# Patient Record
Sex: MALE | Race: WHITE | Employment: FULL TIME | URBAN - METROPOLITAN AREA
[De-identification: names, ages, dates, MRNs, and addresses within clinical notes are randomized per-mention and may not be internally consistent; named-entity substitution may affect disease eponyms.]

---

## 2017-01-06 ENCOUNTER — OFFICE VISIT (OUTPATIENT)
Dept: INTERNAL MEDICINE CLINIC | Age: 37
End: 2017-01-06

## 2017-01-06 VITALS
WEIGHT: 272 LBS | TEMPERATURE: 97.8 F | SYSTOLIC BLOOD PRESSURE: 140 MMHG | HEART RATE: 95 BPM | DIASTOLIC BLOOD PRESSURE: 80 MMHG | OXYGEN SATURATION: 97 % | BODY MASS INDEX: 41.22 KG/M2 | HEIGHT: 68 IN

## 2017-01-06 DIAGNOSIS — J22 ACUTE RESPIRATORY INFECTION: Primary | ICD-10-CM

## 2017-01-06 RX ORDER — DOXYCYCLINE 100 MG/1
100 CAPSULE ORAL 2 TIMES DAILY
Qty: 20 CAP | Refills: 0 | Status: SHIPPED | OUTPATIENT
Start: 2017-01-06 | End: 2017-01-16

## 2017-01-06 NOTE — MR AVS SNAPSHOT
Visit Information Date & Time Provider Department Dept. Phone Encounter #  
 1/6/2017  9:30 AM Jerman Avendano Internist of [address] [id number] Upcoming Health Maintenance Date Due DTaP/Tdap/Td series (1 - Tdap) 5/2/1999 COLONOSCOPY 8/12/2015 INFLUENZA AGE 9 TO ADULT 8/1/2016 Allergies as of 1/6/2017  Review Complete On: 1/6/2017 By: Linda Campos LPN No Active Allergies Current Immunizations  Reviewed on 5/21/2015 Name Date Influenza Vaccine 11/23/2013 Influenza Vaccine Split 10/5/2011 Influenza Vaccine Whole 9/12/2012, 11/4/2009 Not reviewed this visit Vitals BP Pulse Temp Height(growth percentile) Weight(growth percentile) SpO2  
 140/80 95 97.8 °F (36.6 °C) (Oral) 5' 8\" (1.727 m) 272 lb (123.4 kg) 97% BMI Smoking Status 41.36 kg/m2 Former Smoker Vitals History BMI and BSA Data Body Mass Index Body Surface Area  
 41.36 kg/m 2 2.43 m 2 Preferred Pharmacy Pharmacy Name Phone [facility], [address] [facility] [phone] Your Updated Medication List  
  
   
This list is accurate as of: 1/6/17  9:44 AM.  Always use your most recent med list.  
  
  
  
  
 azithromycin 250 mg tablet Commonly known as:  Napoleon Willard Take 2 tablets today, then take 1 tablet daily  
  
 dextroamphetamine-amphetamine 30 mg tablet Commonly known as:  ADDERALL Take 1 Tab by mouth two (2) times a day. escitalopram oxalate 20 mg tablet Commonly known as:  Eugena Leobardo TAKE 1 TABLET BY MOUTH EVERY DAY  
  
 hydrocortisone-pramoxine 2.5-1 % rectal cream  
Commonly known as:  [facility] [place] Apply to anal area TID  
  
 SUMAtriptan 50 mg tablet Commonly known as:  IMITREX Take 1 tab at onset of migraine. Can repeat 1 dose in 2 hrs. No more than 2 tabs in 24 hrs.  
  
 valACYclovir 500 mg tablet Commonly known as:  VALTREX Take 1 Tab by mouth two (2) times a day. Introducing [facility]! Wes Rivera introduces [employer] patient portal. Now you can access parts of your medical record, email your doctor's office, and request medication refills online. 1. In your internet browser, go to [email or web address] 2. Click on the First Time User? Click Here link in the Sign In box. You will see the New Member Sign Up page. 3. Enter your [employer] Access Code exactly as it appears below. You will not need to use this code after youve completed the sign-up process. If you do not sign up before the expiration date, you must request a new code. · [employer] Access Code: [email or web address] Expires: 4/6/2017  9:44 AM 
 
4. Enter the last four digits of your Social Security Number (xxxx) and Date of Birth (mm/dd/yyyy) as indicated and click Submit. You will be taken to the next sign-up page. 5. Create a [employer] ID. This will be your [employer] login ID and cannot be changed, so think of one that is secure and easy to remember. 6. Create a [employer] password. You can change your password at any time. 7. Enter your Password Reset Question and Answer. This can be used at a later time if you forget your password. 8. Enter your e-mail address. You will receive e-mail notification when new information is available in [address] 9. Click Sign Up. You can now view and download portions of your medical record. 10. Click the Download Summary menu link to download a portable copy of your medical information. If you have questions, please visit the Frequently Asked Questions section of the [employer] website. Remember, [employer] is NOT to be used for urgent needs. For medical emergencies, dial 911. Now available from your iPhone and Android! Please provide this summary of care documentation to your next provider. Your primary care clinician is listed as Nicolas Caldwell.  If you have any questions after today's visit, please call [phone].

## 2017-01-09 NOTE — PROGRESS NOTES
I reviewed the patient's medical history, the physician assistant's findings on physical examination, the patient's diagnoses, and treatment plan as documented in the progress note. I concur with the treatment plan as documented. There are no additional recommendations at this time.

## 2017-01-12 ENCOUNTER — TELEPHONE (OUTPATIENT)
Dept: INTERNAL MEDICINE CLINIC | Age: 37
End: 2017-01-12

## 2017-01-12 DIAGNOSIS — J40 BRONCHITIS: Primary | ICD-10-CM

## 2017-01-12 RX ORDER — CODEINE PHOSPHATE AND GUAIFENESIN 10; 100 MG/5ML; MG/5ML
5 SOLUTION ORAL
Qty: 120 ML | Refills: 0 | OUTPATIENT
Start: 2017-01-12 | End: 2017-03-23 | Stop reason: ALTCHOICE

## 2017-01-24 DIAGNOSIS — F98.8 ADD (ATTENTION DEFICIT DISORDER): ICD-10-CM

## 2017-01-24 NOTE — TELEPHONE ENCOUNTER
Reviewed report generated by the [facility]. Does not demonstrate aberrancies or inconsistencies with regard to the prescribing of controlled medications to this patient by other providers.   Last filed 12/27/16

## 2017-01-25 RX ORDER — DEXTROAMPHETAMINE SACCHARATE, AMPHETAMINE ASPARTATE, DEXTROAMPHETAMINE SULFATE AND AMPHETAMINE SULFATE 7.5; 7.5; 7.5; 7.5 MG/1; MG/1; MG/1; MG/1
30 TABLET ORAL 2 TIMES DAILY
Qty: 60 TAB | Refills: 0 | Status: SHIPPED | OUTPATIENT
Start: 2017-01-25 | End: 2017-02-22 | Stop reason: SDUPTHER

## 2017-02-22 DIAGNOSIS — F98.8 ADD (ATTENTION DEFICIT DISORDER): ICD-10-CM

## 2017-02-22 RX ORDER — DEXTROAMPHETAMINE SACCHARATE, AMPHETAMINE ASPARTATE, DEXTROAMPHETAMINE SULFATE AND AMPHETAMINE SULFATE 7.5; 7.5; 7.5; 7.5 MG/1; MG/1; MG/1; MG/1
30 TABLET ORAL 2 TIMES DAILY
Qty: 60 TAB | Refills: 0 | Status: SHIPPED | OUTPATIENT
Start: 2017-02-22 | End: 2017-03-21 | Stop reason: SDUPTHER

## 2017-02-22 NOTE — TELEPHONE ENCOUNTER
Patient called for   Requested Prescriptions     Pending Prescriptions Disp Refills    dextroamphetamine-amphetamine (ADDERALL) 30 mg tablet 60 Tab 0     Sig: Take 1 Tab by mouth two (2) times a day. Please call when ready for .

## 2017-02-22 NOTE — TELEPHONE ENCOUNTER
Last filled per  1/26/17  Reviewed report generated by the [facility]. Does not demonstrate aberrancies or inconsistencies with regard to the prescribing of controlled medications to this patient by other providers.

## 2017-03-21 DIAGNOSIS — F98.8 ADD (ATTENTION DEFICIT DISORDER): ICD-10-CM

## 2017-03-22 RX ORDER — DEXTROAMPHETAMINE SACCHARATE, AMPHETAMINE ASPARTATE, DEXTROAMPHETAMINE SULFATE AND AMPHETAMINE SULFATE 7.5; 7.5; 7.5; 7.5 MG/1; MG/1; MG/1; MG/1
30 TABLET ORAL 2 TIMES DAILY
Qty: 60 TAB | Refills: 0 | Status: SHIPPED | OUTPATIENT
Start: 2017-03-22 | End: 2017-04-21 | Stop reason: SDUPTHER

## 2017-03-23 ENCOUNTER — OFFICE VISIT (OUTPATIENT)
Dept: INTERNAL MEDICINE CLINIC | Age: 37
End: 2017-03-23

## 2017-03-23 VITALS
SYSTOLIC BLOOD PRESSURE: 143 MMHG | WEIGHT: 279.8 LBS | HEIGHT: 68 IN | RESPIRATION RATE: 16 BRPM | TEMPERATURE: 97.5 F | HEART RATE: 119 BPM | BODY MASS INDEX: 42.41 KG/M2 | OXYGEN SATURATION: 97 % | DIASTOLIC BLOOD PRESSURE: 103 MMHG

## 2017-03-23 DIAGNOSIS — E66.01 OBESITY, MORBID, BMI 40.0-49.9 (HCC): ICD-10-CM

## 2017-03-23 DIAGNOSIS — I10 ESSENTIAL HYPERTENSION: Primary | ICD-10-CM

## 2017-03-23 DIAGNOSIS — R00.0 TACHYCARDIA: ICD-10-CM

## 2017-03-23 DIAGNOSIS — F98.8 ADD (ATTENTION DEFICIT DISORDER): ICD-10-CM

## 2017-03-23 DIAGNOSIS — I10 ESSENTIAL HYPERTENSION: ICD-10-CM

## 2017-03-23 LAB
A-G RATIO,AGRAT: 1.8 RATIO (ref 1.1–2.6)
ABSOLUTE LYMPHOCYTE COUNT, 10803: 1.8 K/UL (ref 1–4.8)
ALBUMIN SERPL-MCNC: 4.4 G/DL (ref 3.5–5)
ALP SERPL-CCNC: 105 U/L (ref 25–115)
ALT SERPL-CCNC: 8 U/L (ref 5–40)
ANION GAP SERPL CALC-SCNC: 20 MMOL/L
AST SERPL W P-5'-P-CCNC: 18 U/L (ref 10–37)
BASOPHILS # BLD: 0 K/UL (ref 0–0.2)
BASOPHILS NFR BLD: 0 % (ref 0–2)
BILIRUB SERPL-MCNC: 0.6 MG/DL (ref 0.2–1.2)
BUN SERPL-MCNC: 9 MG/DL (ref 6–22)
CALCIUM SERPL-MCNC: 9.1 MG/DL (ref 8.4–10.4)
CHLORIDE SERPL-SCNC: 97 MMOL/L (ref 98–110)
CO2 SERPL-SCNC: 21 MMOL/L (ref 20–32)
CREAT SERPL-MCNC: 0.7 MG/DL (ref 0.5–1.2)
EOSINOPHIL # BLD: 0.2 K/UL (ref 0–0.5)
EOSINOPHIL NFR BLD: 2 % (ref 0–6)
ERYTHROCYTE [DISTWIDTH] IN BLOOD BY AUTOMATED COUNT: 13.1 % (ref 10–16)
GFRAA, 66117: >60
GFRNA, 66118: >60
GLOBULIN,GLOB: 2.5 G/DL (ref 2–4)
GLUCOSE SERPL-MCNC: 64 MG/DL (ref 65–99)
GRANULOCYTES,GRANS: 68 % (ref 40–75)
HCT VFR BLD AUTO: 44.5 % (ref 36.6–51.9)
HGB BLD-MCNC: 15.3 G/DL (ref 13.2–17.3)
LYMPHOCYTES, LYMLT: 20 % (ref 27–45)
MCH RBC QN AUTO: 31 PG (ref 26–34)
MCHC RBC AUTO-ENTMCNC: 34 G/DL (ref 32–36)
MCV RBC AUTO: 90 FL (ref 80–95)
MONOCYTES # BLD: 0.8 K/UL (ref 0.1–0.9)
MONOCYTES NFR BLD: 9 % (ref 3–9)
NEUTROPHILS # BLD AUTO: 5.9 K/UL (ref 1.8–7.7)
PLATELET # BLD AUTO: 314 K/UL (ref 140–440)
PMV BLD AUTO: 9.9 FL (ref 6–10.8)
POTASSIUM SERPL-SCNC: 5 MMOL/L (ref 3.5–5.5)
PROT SERPL-MCNC: 6.9 G/DL (ref 6.4–8.3)
RBC # BLD AUTO: 4.94 M/UL (ref 3.8–5.8)
SODIUM SERPL-SCNC: 138 MMOL/L (ref 133–145)
WBC # BLD AUTO: 8.8 K/UL (ref 4–11)

## 2017-03-23 NOTE — PATIENT INSTRUCTIONS
Health Maintenance Due   Topic Date Due    DTaP/Tdap/Td series (1 - Tdap) 05/02/1999    COLONOSCOPY  08/12/2015    INFLUENZA AGE 9 TO ADULT  08/01/2016          Eating Healthy Foods: Care Instructions  Your Care Instructions  Eating healthy foods can help lower your risk for disease. Healthy food gives you energy and keeps your heart strong, your brain active, your muscles working, and your bones strong. A healthy diet includes a variety of foods from the basic food groups: grains, vegetables, fruits, milk and milk products, and meat and beans. Some people may eat more of their favorite foods from only one food group and, as a result, miss getting the nutrients they need. So, it is important to pay attention not only to what you eat but also to what you are missing from your diet. You can eat a healthy, balanced diet by making a few small changes. Follow-up care is a key part of your treatment and safety. Be sure to make and go to all appointments, and call your doctor if you are having problems. Its also a good idea to know your test results and keep a list of the medicines you take. How can you care for yourself at home? Look at what you eat  · Keep a food diary for a week or two and record everything you eat or drink. Track the number of servings you eat from each food group. · For a balanced diet every day, eat a variety of:  ¨ 6 or more ounce-equivalents of grains, such as cereals, breads, crackers, rice, or pasta, every day. An ounce-equivalent is 1 slice of bread, 1 cup of ready-to-eat cereal, or ½ cup of cooked rice, cooked pasta, or cooked cereal.  ¨ 2½ cups of vegetables, especially:  § Dark-green vegetables such as broccoli and spinach. § Orange vegetables such as carrots and sweet potatoes. § Dry beans (such as knowles and kidney beans) and peas (such as lentils). ¨ 2 cups of fresh, frozen, or canned fruit. A small apple or 1 banana or orange equals 1 cup.   ¨ 3 cups of nonfat or low-fat milk, yogurt, or other milk products. ¨ 5½ ounces of meat and beans, such as chicken, fish, lean meat, beans, nuts, and seeds. One egg, 1 tablespoon of peanut butter, ½ ounce nuts or seeds, or ¼ cup of cooked beans equals 1 ounce of meat. · Learn how to read food labels for serving sizes and ingredients. Fast-food and convenience-food meals often contain few or no fruits or vegetables. Make sure you eat some fruits and vegetables to make the meal more nutritious. · Look at your food diary. For each food group, add up what you have eaten and then divide the total by the number of days. This will give you an idea of how much you are eating from each food group. See if you can find some ways to change your diet to make it more healthy. Start small  · Do not try to make dramatic changes to your diet all at once. You might feel that you are missing out on your favorite foods and then be more likely to fail. · Start slowly, and gradually change your habits. Try some of the following:  ¨ Use whole wheat bread instead of white bread. ¨ Use nonfat or low-fat milk instead of whole milk. ¨ Eat brown rice instead of white rice, and eat whole wheat pasta instead of white-flour pasta. ¨ Try low-fat cheeses and low-fat yogurt. ¨ Add more fruits and vegetables to meals and have them for snacks. ¨ Add lettuce, tomato, cucumber, and onion to sandwiches. ¨ Add fruit to yogurt and cereal.  Enjoy food  · You can still eat your favorite foods. You just may need to eat less of them. If your favorite foods are high in fat, salt, and sugar, limit how often you eat them, but do not cut them out entirely. · Eat a wide variety of foods. Make healthy choices when eating out  · The type of restaurant you choose can help you make healthy choices. Even fast-food chains are now offering more low-fat or healthier choices on the menu. · Choose smaller portions, or take half of your meal home.   · When eating out, try:  ¨ A veggie pizza with a whole wheat crust or grilled chicken (instead of sausage or pepperoni). ¨ Pasta with roasted vegetables, grilled chicken, or marinara sauce instead of cream sauce. ¨ A vegetable wrap or grilled chicken wrap. ¨ Broiled or poached food instead of fried or breaded items. Make healthy choices easy  · Buy packaged, prewashed, ready-to-eat fresh vegetables and fruits, such as baby carrots, salad mixes, and chopped or shredded broccoli and cauliflower. · Buy packaged, presliced fruits, such as melon or pineapple. · Choose 100% fruit or vegetable juice instead of soda. Limit juice intake to 4 to 6 oz (½ to ¾ cup) a day. · Blend low-fat yogurt, fruit juice, and canned or frozen fruit to make a smoothie for breakfast or a snack. Where can you learn more? Go to http://shaylaAmerican Biosurgicalmellissa.info/. Enter T756 in the search box to learn more about \"Eating Healthy Foods: Care Instructions. \"  Current as of: November 20, 2015  Content Version: 11.1  © 2037-1964 [employer]. Care instructions adapted under license by [employer] (which disclaims liability or warranty for this information). If you have questions about a medical condition or this instruction, always ask your healthcare professional. [address] any warranty or liability for your use of this information. Food as Fuel: Care Instructions  Your Care Instructions  A healthy, balanced diet gives your body nutrients. Nutrients are like fuel for your body. They give you energy. And they keep your heart beating, your brain active, and your muscles working. They also help to build and strengthen bones, muscles, and other body tissues. Your body needs three major nutrients for energy. These are carbohydrate, protein, and fat. · Carbohydrate provides energy for your brain, muscles, heart, and lungs. It is found in bread, cereal, rice, pasta, fruits, vegetables, milk, yogurt, and sugar.   · Protein provides energy and helps build and repair your body's cells. It is found in meat, poultry, fish, cooked dry beans, cheese, tofu and other soy products, nuts and seeds, and milk and milk products. · Fat provides energy, helps build the covering around nerves in your body, and helps make hormones. Fat is found in butter, margarine, oil, mayonnaise, salad dressing, and nuts. It is also found in most foods that come from animals, such as meat and milk products. Many foods also have fat added to them. Your body needs all three of these nutrients to be healthy. If you choose a good mix of foods, you can help your body get the right amounts of carbohydrate, protein, and fat. It can also keep you at a healthy weight. Follow-up care is a key part of your treatment and safety. Be sure to make and go to all appointments, and call your doctor if you are having problems. It's also a good idea to know your test results and keep a list of the medicines you take. How can you care for yourself at home? Eat a balanced diet  · Try to eat variety of healthy foods every day. These include:  ¨ 5 to 8 ounces of bread, cereal, crackers, rice, or pasta. An ounce is about 1 slice of bread, ¾ cup of breakfast cereal, or ½ cup of cooked rice, cereal, or pasta. Choose whole-grain products for at least half of your grain servings. ¨ 2 to 3 cups of vegetables. Be sure to include:  § Dark green vegetables such as broccoli and spinach. § Orange vegetables such as carrots and sweet potatoes. ¨ 1½ to 2 cups of fresh, frozen, or canned fruit. A banana, an orange, or a large apple equals 1 cup. ¨ 3 cups of nonfat or low-fat milk, yogurt, or other milk products. ¨ 5 to 6½ ounces of protein foods. These include chicken, fish, lean meat, beans, nuts, and seeds. One egg equals 1 ounce of meat, poultry, or fish. A ½ cup of cooked beans equals 2 ounces of protein.   Stay fueled all day  · Start your day with breakfast. If you don't have time to sit down for a bowl of cereal in the morning, try something that you can eat \"on the go. \" Try a piece of whole wheat bread with peanut butter or a container of yogurt with frozen berries mixed in. · Eat regularly scheduled meals and snacks. If you miss a meal, you may overeat at the next meal. Or you may choose a less healthy snack. · Drink enough water. (If you have kidney, heart, or liver disease and have to limit fluids, talk with your doctor before you increase your fluid intake.)  Where can you learn more? Go to http://shayla-mellissa.info/. Enter L985 in the search box to learn more about \"Food as Fuel: Care Instructions. \"  Current as of: July 26, 2016  Content Version: 11.1  © 7376-4610 [employer]. Care instructions adapted under license by [employer] (which disclaims liability or warranty for this information). If you have questions about a medical condition or this instruction, always ask your healthcare professional. [place] [address] any warranty or liability for your use of this information. High Blood Pressure: Care Instructions  Your Care Instructions  If your blood pressure is usually above 140/90, you have high blood pressure, or hypertension. That means the top number is 140 or higher or the bottom number is 90 or higher, or both. Despite what a lot of people think, high blood pressure usually doesn't cause headaches or make you feel dizzy or lightheaded. It usually has no symptoms. But it does increase your risk for heart attack, stroke, and kidney or eye damage. The higher your blood pressure, the more your risk increases. Your doctor will give you a goal for your blood pressure. Your goal will be based on your health and your age. An example of a goal is to keep your blood pressure below 140/90. Lifestyle changes, such as eating healthy and being active, are always important to help lower blood pressure.  You might also take medicine to reach your blood pressure goal.  Follow-up care is a key part of your treatment and safety. Be sure to make and go to all appointments, and call your doctor if you are having problems. It's also a good idea to know your test results and keep a list of the medicines you take. How can you care for yourself at home? Medical treatment  · If you stop taking your medicine, your blood pressure will go back up. You may take one or more types of medicine to lower your blood pressure. Be safe with medicines. Take your medicine exactly as prescribed. Call your doctor if you think you are having a problem with your medicine. · Talk to your doctor before you start taking aspirin every day. Aspirin can help certain people lower their risk of a heart attack or stroke. But taking aspirin isn't right for everyone, because it can cause serious bleeding. · See your doctor regularly. You may need to see the doctor more often at first or until your blood pressure comes down. · If you are taking blood pressure medicine, talk to your doctor before you take decongestants or anti-inflammatory medicine, such as ibuprofen. Some of these medicines can raise blood pressure. · Learn how to check your blood pressure at home. Lifestyle changes  · Stay at a healthy weight. This is especially important if you put on weight around the waist. Losing even 10 pounds can help you lower your blood pressure. · If your doctor recommends it, get more exercise. Walking is a good choice. Bit by bit, increase the amount you walk every day. Try for at least 30 minutes on most days of the week. You also may want to swim, bike, or do other activities. · Avoid or limit alcohol. Talk to your doctor about whether you can drink any alcohol. · Try to limit how much sodium you eat to less than 2,300 milligrams (mg) a day. Your doctor may ask you to try to eat less than 1,500 mg a day.   · Eat plenty of fruits (such as bananas and oranges), vegetables, legumes, whole grains, and low-fat dairy products. · Lower the amount of saturated fat in your diet. Saturated fat is found in animal products such as milk, cheese, and meat. Limiting these foods may help you lose weight and also lower your risk for heart disease. · Do not smoke. Smoking increases your risk for heart attack and stroke. If you need help quitting, talk to your doctor about stop-smoking programs and medicines. These can increase your chances of quitting for good. When should you call for help? Call 911 anytime you think you may need emergency care. This may mean having symptoms that suggest that your blood pressure is causing a serious heart or blood vessel problem. Your blood pressure may be over 180/110. For example, call 911 if:  · You have symptoms of a heart attack. These may include:  ¨ Chest pain or pressure, or a strange feeling in the chest.  ¨ Sweating. ¨ Shortness of breath. ¨ Nausea or vomiting. ¨ Pain, pressure, or a strange feeling in the back, neck, jaw, or upper belly or in one or both shoulders or arms. ¨ Lightheadedness or sudden weakness. ¨ A fast or irregular heartbeat. · You have symptoms of a stroke. These may include:  ¨ Sudden numbness, tingling, weakness, or loss of movement in your face, arm, or leg, especially on only one side of your body. ¨ Sudden vision changes. ¨ Sudden trouble speaking. ¨ Sudden confusion or trouble understanding simple statements. ¨ Sudden problems with walking or balance. ¨ A sudden, severe headache that is different from past headaches. · You have severe back or belly pain. Do not wait until your blood pressure comes down on its own. Get help right away. Call your doctor now or seek immediate care if:  · Your blood pressure is much higher than normal (such as 180/110 or higher), but you don't have symptoms. · You think high blood pressure is causing symptoms, such as:  ¨ Severe headache. ¨ Blurry vision.   Watch closely for changes in your health, and be sure to contact your doctor if:  · Your blood pressure measures 140/90 or higher at least 2 times. That means the top number is 140 or higher or the bottom number is 90 or higher, or both. · You think you may be having side effects from your blood pressure medicine. · Your blood pressure is usually normal, but it goes above normal at least 2 times. Where can you learn more? Go to http://shayla-mellissa.info/. Enter K004 in the search box to learn more about \"High Blood Pressure: Care Instructions. \"  Current as of: August 8, 2016  Content Version: 11.1  © 2067-7314 [employer]. Care instructions adapted under license by [employer] (which disclaims liability or warranty for this information). If you have questions about a medical condition or this instruction, always ask your healthcare professional. [address] any warranty or liability for your use of this information. DASH Diet: Care Instructions  Your Care Instructions  The DASH diet is an eating plan that can help lower your blood pressure. DASH stands for Dietary Approaches to Stop Hypertension. Hypertension is high blood pressure. The DASH diet focuses on eating foods that are high in calcium, potassium, and magnesium. These nutrients can lower blood pressure. The foods that are highest in these nutrients are fruits, vegetables, low-fat dairy products, nuts, seeds, and legumes. But taking calcium, potassium, and magnesium supplements instead of eating foods that are high in those nutrients does not have the same effect. The DASH diet also includes whole grains, fish, and poultry. The DASH diet is one of several lifestyle changes your doctor may recommend to lower your high blood pressure. Your doctor may also want you to decrease the amount of sodium in your diet.  Lowering sodium while following the DASH diet can lower blood pressure even further than just the DASH diet alone.  Follow-up care is a key part of your treatment and safety. Be sure to make and go to all appointments, and call your doctor if you are having problems. It's also a good idea to know your test results and keep a list of the medicines you take. How can you care for yourself at home? Following the DASH diet  · Eat 4 to 5 servings of fruit each day. A serving is 1 medium-sized piece of fruit, ½ cup chopped or canned fruit, 1/4 cup dried fruit, or 4 ounces (½ cup) of fruit juice. Choose fruit more often than fruit juice. · Eat 4 to 5 servings of vegetables each day. A serving is 1 cup of lettuce or raw leafy vegetables, ½ cup of chopped or cooked vegetables, or 4 ounces (½ cup) of vegetable juice. Choose vegetables more often than vegetable juice. · Get 2 to 3 servings of low-fat and fat-free dairy each day. A serving is 8 ounces of milk, 1 cup of yogurt, or 1 ½ ounces of cheese. · Eat 6 to 8 servings of grains each day. A serving is 1 slice of bread, 1 ounce of dry cereal, or ½ cup of cooked rice, pasta, or cooked cereal. Try to choose whole-grain products as much as possible. · Limit lean meat, poultry, and fish to 2 servings each day. A serving is 3 ounces, about the size of a deck of cards. · Eat 4 to 5 servings of nuts, seeds, and legumes (cooked dried beans, lentils, and split peas) each week. A serving is 1/3 cup of nuts, 2 tablespoons of seeds, or ½ cup of cooked beans or peas. · Limit fats and oils to 2 to 3 servings each day. A serving is 1 teaspoon of vegetable oil or 2 tablespoons of salad dressing. · Limit sweets and added sugars to 5 servings or less a week. A serving is 1 tablespoon jelly or jam, ½ cup sorbet, or 1 cup of lemonade. · Eat less than 2,300 milligrams (mg) of sodium a day. If you limit your sodium to 1,500 mg a day, you can lower your blood pressure even more. Tips for success  · Start small. Do not try to make dramatic changes to your diet all at once.  You might feel that you are missing out on your favorite foods and then be more likely to not follow the plan. Make small changes, and stick with them. Once those changes become habit, add a few more changes. · Try some of the following:  ¨ Make it a goal to eat a fruit or vegetable at every meal and at snacks. This will make it easy to get the recommended amount of fruits and vegetables each day. ¨ Try yogurt topped with fruit and nuts for a snack or healthy dessert. ¨ Add lettuce, tomato, cucumber, and onion to sandwiches. ¨ Combine a ready-made pizza crust with low-fat mozzarella cheese and lots of vegetable toppings. Try using tomatoes, squash, spinach, broccoli, carrots, cauliflower, and onions. ¨ Have a variety of cut-up vegetables with a low-fat dip as an appetizer instead of chips and dip. ¨ Sprinkle sunflower seeds or chopped almonds over salads. Or try adding chopped walnuts or almonds to cooked vegetables. ¨ Try some vegetarian meals using beans and peas. Add garbanzo or kidney beans to salads. Make burritos and tacos with mashed knowles beans or black beans. Where can you learn more? Go to http://shayla-mellissa.info/. Enter K531 in the search box to learn more about \"DASH Diet: Care Instructions. \"  Current as of: March 23, 2016  Content Version: 11.1  © 8902-8372 [employer]. Care instructions adapted under license by [employer] (which disclaims liability or warranty for this information). If you have questions about a medical condition or this instruction, always ask your healthcare professional. [place] [address] any warranty or liability for your use of this information.

## 2017-03-23 NOTE — PROGRESS NOTES
HISTORY OF PRESENT Jean Bhardwaj is a 43 y.o. male. Here today for acute visit. HPI   Elevated BP/ tachycardia  Patient states he was at his podiatrist office on Monday, his vitals showed elevated /92, . He had no chest pain, sob or any symtpoms, was referred to an Urgent care for eval. They advized him to follow up with his PCP. He has a BP log with him today for the last 3 days:   122/92   156/92   136/90 HR 95  147/106   123/97   131/98   145/101   S/p gastric bypass sugery 2010 at weight of 370s. Weight down to 190 lbs, now has gained up to 279 again. Per his medical chart he was previously diagnosed with HTN on lisiniopril 10mg, but he can not recall this. He denies any symptoms of CP, SOB, BARRY, breathing problems. No pain, no signs of infection. Denies any bloody or black stools. Denies anxiety or stress. He does not feel his heart racing or any palpitations. He is on adderall 30mg daily, he states he has been on this for about 10 years, has never had any problems with this before. His mother had multiple heart problems, valve disorders. He has been working to cut out sodas this week.      Past Medical History:   Diagnosis Date    ADD  10/11    Allergic rhinitis     Asthma     Chronic pain     Back    Colon polyp     Depression     FHx: heart disease     GERD (gastroesophageal reflux disease)     Hepatic steatosis     HTN (hypertension)     resolved after wt loss    Obesity     Gastric bypass 5/10 Dr. Veronica Parada BMI 54, peak weight 367; elvis 195 lbs    FRANCISCA on CPAP     resolved after wt loss    Stomach ulcer 2002    Dr. Alan Witt Ulcerative proctitis ([facility] [address]     WRA-Back, Hips, Feet      Past Surgical History:   Procedure Laterality Date    BIOPSY LIVER      Dr Veronica Parada 2010    ENDOSCOPY, COLON, DIAGNOSTIC      2002 negative, last 2005 proctitis Dr Beba Rendon  9/08    Dr Ritika Cervantes  HX GASTRIC BYPASS  3/10    Lap Cherri-en-Y BMI 47    HX ORTHOPAEDIC      left hip Dr Kathryn Milian age 13     Current Outpatient Prescriptions   Medication Sig    dextroamphetamine-amphetamine (ADDERALL) 30 mg tablet Take 1 Tab by mouth two (2) times a dayEarliest Fill Date: 3/22/17.  escitalopram oxalate (LEXAPRO) 20 mg tablet TAKE 1 TABLET BY MOUTH EVERY DAY    valACYclovir (VALTREX) 500 mg tablet Take 1 Tab by mouth two (2) times a day.  SUMAtriptan (IMITREX) 50 mg tablet Take 1 tab at onset of migraine. Can repeat 1 dose in 2 hrs. No more than 2 tabs in 24 hrs. No current facility-administered medications for this visit. Allergies and Intolerances:   No Active Allergies  Family History:   Family History   Problem Relation Age of Onset    Cancer Maternal Grandmother      colon    Stroke Maternal Grandfather     Diabetes Paternal Grandfather     Heart Disease Paternal Grandfather     Cancer Paternal Grandfather      colon    Arthritis-osteo Mother     Heart Disease Mother     Depression Mother     Hypertension Father     Depression Father     Elevated Lipids Father         Social History:   He  reports that he has quit smoking. His smokeless tobacco use includes Chew. He  reports that he does not drink alcohol. Vitals:   Visit Vitals    BP (!) 143/103    Pulse (!) 119    Temp 97.5 °F (36.4 °C) (Oral)    Resp 16    Ht 5' 8\" (1.727 m)    Wt 279 lb 12.8 oz (126.9 kg)    SpO2 97%    BMI 42.54 kg/m2        Body surface area is 2.47 meters squared. BP Readings from Last 3 Encounters:   03/23/17 (!) 143/103   01/06/17 140/80   01/06/16 134/82        Wt Readings from Last 3 Encounters:   03/23/17 279 lb 12.8 oz (126.9 kg)   01/06/17 272 lb (123.4 kg)   01/06/16 247 lb (112 kg)        Case and notes discussed with MA and reviewed with patient for accuracy.      I have personally reviewed and subsequently discussed with the MA any pertinent, preliminary and incomplete elements of the history related to the chief complaint that were recorded by the MA in the patients chart during the initial rooming of the patient. As I have explored the necessary and required elements in detail with the patient during my personal interview with them, I have personally verified, clarified, amended, added to and/or revised said elements based on information acquired during during the interview. Review of Systems   Constitutional: Positive for malaise/fatigue. Negative for chills, fever and weight loss. HENT: Negative for congestion, ear pain and sore throat. Eyes: Negative for blurred vision and double vision. Respiratory: Negative for cough, hemoptysis, sputum production, shortness of breath and wheezing. Cardiovascular: Negative for chest pain, palpitations and leg swelling. Gastrointestinal: Negative for abdominal pain, blood in stool, constipation, diarrhea, melena, nausea and vomiting. Genitourinary: Negative for dysuria, frequency, hematuria and urgency. Musculoskeletal: Positive for neck pain. Skin: Negative for itching and rash. Neurological: Positive for headaches. Negative for seizures and loss of consciousness. Physical Exam   Constitutional: He appears well-developed and well-nourished. No distress. HENT:   Head: Normocephalic and atraumatic. Nose: Nose normal.   Mouth/Throat: Uvula is midline, oropharynx is clear and moist and mucous membranes are normal.   Eyes: Conjunctivae are normal. Pupils are equal, round, and reactive to light. Cardiovascular: Regular rhythm and normal heart sounds. Tachycardia present. No murmur heard. Pulmonary/Chest: Effort normal and breath sounds normal. No respiratory distress. Abdominal: Soft. Bowel sounds are normal.   Musculoskeletal: He exhibits no edema. Neurological: He is alert. Skin: Skin is warm and dry. No rash noted. Psychiatric: He has a normal mood and affect.  His behavior is normal.   Nursing note and vitals reviewed. EKG today: Sinus tachycardia , no acute abnormalities, reviewed with Dr Kandy Waterman in the office today. ASSESSMENT and PLAN    ICD-10-CM ICD-9-CM    1. Essential hypertension: history of HTN, then off meds with weight loss after bariatric surg. Discussed what HTN is, what it means for your health, importance of controlling BP. Discussed lifestyle and dietary changes, low sodium, heart healthy. Discussed weight loss as an important part of BP control. continue to monitor BP and HR at home, bring to follow up apt. Will follow up with patient in 2-4 weeks, may need to add medication. I10 401.9 AMB POC EKG ROUTINE W/ 12 LEADS, INTER & REP      CBC WITH AUTOMATED DIFF      METABOLIC PANEL, COMPREHENSIVE      TSH 3RD GENERATION      2D ECHO COMPLETE ADULT (TTE) W OR WO CONTR      LIPID PANEL   2. Tachycardia: unclear on cause. EKG with no acute abnormalities other than slightly tachy at 103. No chest pain, no SOB, BARRY, no other symptoms. Denies anxiety. Discussed may be due to deconditioning, may be related to adderall use. Will start work up with labs, cbc, cmp, tsh. Will check echo, family history of valve disorders in his mother. Encouraged adequate hydration, at least 64 oz water a day. Cut back on sodas. Discussed alarm symptoms, reasons to call or follow up sooner, and reasons to report to the ER. Patient verbalizes understanding and agrees to plan.    R00.0 785.0 AMB POC EKG ROUTINE W/ 12 LEADS, INTER & REP      CBC WITH AUTOMATED DIFF      METABOLIC PANEL, COMPREHENSIVE      TSH 3RD GENERATION      2D ECHO COMPLETE ADULT (TTE) W OR WO CONTR      LIPID PANEL   3. Obesity, morbid, BMI 40.0-49.9 ([address]): HTN and elevated HR my be due to deconditioning, he does admit he has not been dieting or watching what he eats, no exercise. Discussed need to get back on track with weight loss. E66.01 278.01 LIPID PANEL   4. ADD (attention deficit disorder): continues on adderall 30mg daily. Discussed that this could cause HTN, tachycardia. He has been on this for several years now with no previous problems. Could trial off this medication if no improvement to BP and HR. Will follow up in 2-4 weeks to reassess. F98.8 314.00      Lee Veliz was seen today for hypertension and fatigue. Diagnoses and all orders for this visit:    Essential hypertension  -     AMB POC EKG ROUTINE W/ 12 LEADS, INTER & REP  -     CBC WITH AUTOMATED DIFF; Future  -     METABOLIC PANEL, COMPREHENSIVE; Future  -     TSH 3RD GENERATION; Future  -     2D ECHO COMPLETE ADULT (TTE) W OR WO CONTR; Future  -     LIPID PANEL; Future    Tachycardia  -     AMB POC EKG ROUTINE W/ 12 LEADS, INTER & REP  -     CBC WITH AUTOMATED DIFF; Future  -     METABOLIC PANEL, COMPREHENSIVE; Future  -     TSH 3RD GENERATION; Future  -     2D ECHO COMPLETE ADULT (TTE) W OR WO CONTR; Future  -     LIPID PANEL; Future    Obesity, morbid, BMI 40.0-49.9 ([facility] [address]  -     LIPID PANEL; Future    ADD (attention deficit disorder)      Follow-up Disposition:  Return in about 2 weeks (around 4/6/2017), or if symptoms worsen or fail to improve. The plan of care was discussed with the patient, who verbalizes understanding. Discussed all medications, side effects with patient. The patient is to follow up as scheduled and will report to the ED or the office if symptoms change or increase. The patient has voiced understanding and will comply to plan of care.

## 2017-03-23 NOTE — PROGRESS NOTES
Chief Complaint   Patient presents with    Hypertension     follow up on Hypertension, over the past few days has noticed elevation on blood pressure while at the Podiatrist     Fatigue     past week or so        1. Have you been to the ER, urgent care clinic since your last visit? Hospitalized since your last visit? No    2. Have you seen or consulted any other health care providers outside of the [facility] since your last visit? Include any pap smears or colon screening.  No

## 2017-03-23 NOTE — MR AVS SNAPSHOT
Visit Information Date & Time Provider Department Dept. Phone Encounter #  
 3/23/2017 12:45 PM [facility] [address] [id number] Follow-up Instructions Return in about 2 weeks (around 4/6/2017), or if symptoms worsen or fail to improve. Your Appointments 3/28/2017 11:30 AM  
PHYSICAL with Avery Zapata MD  
Internist of [facility] [address] [place] [address] Road) Appt Note: dx CPE / check bp  
 [address] [facility], [address] [facility] [address] [place]  
  
   
 [address], [address]  
  
    
 4/7/2017  9:30 AM  
Office Visit with Ifeoma Mabry NP Internist of [facility] ([address]) Appt Note: f/u  
 [address] [facility], [address] Arielle Fernandez [address] [place]  
  
   
 [address], [address] Upcoming Health Maintenance Date Due DTaP/Tdap/Td series (1 - Tdap) 5/2/1999 COLONOSCOPY 8/12/2015 INFLUENZA AGE 9 TO ADULT 8/1/2016 Allergies as of 3/23/2017  Review Complete On: 3/23/2017 By: Ifeoma Mabry NP No Active Allergies Current Immunizations  Reviewed on 5/21/2015 Name Date Influenza Vaccine 11/23/2013 Influenza Vaccine Split 10/5/2011 Influenza Vaccine Whole 9/12/2012, 11/4/2009 Not reviewed this visit You Were Diagnosed With   
  
 Codes Comments Essential hypertension    -  Primary ICD-10-CM: I10 
ICD-9-CM: 401.9 Tachycardia     ICD-10-CM: R00.0 ICD-9-CM: 785.0 Obesity, morbid, BMI 40.0-49.9 (HCC)     ICD-10-CM: E66.01 
ICD-9-CM: 278.01   
 ADD (attention deficit disorder)     ICD-10-CM: F98.8 ICD-9-CM: 314.00 Vitals BP Pulse Temp Resp Height(growth percentile) Weight(growth percentile) (!) 143/103 (!) 119 97.5 °F (36.4 °C) (Oral) 16 5' 8\" (1.727 m) 279 lb 12.8 oz (126.9 kg) SpO2 BMI Smoking Status 97% 42.54 kg/m2 Former Smoker Vitals History BMI and BSA Data Body Mass Index Body Surface Area 42.54 kg/m 2 2.47 m 2 Preferred Pharmacy Pharmacy Name Phone [facility] [address], [address] [phone] Your Updated Medication List  
  
   
This list is accurate as of: 3/23/17  2:10 PM.  Always use your most recent med list.  
  
  
  
  
 dextroamphetamine-amphetamine 30 mg tablet Commonly known as:  ADDERALL Take 1 Tab by mouth two (2) times a dayEarliest Fill Date: 3/22/17. escitalopram oxalate 20 mg tablet Commonly known as:  Wilene Heads TAKE 1 TABLET BY MOUTH EVERY DAY  
  
 SUMAtriptan 50 mg tablet Commonly known as:  IMITREX Take 1 tab at onset of migraine. Can repeat 1 dose in 2 hrs. No more than 2 tabs in 24 hrs.  
  
 valACYclovir 500 mg tablet Commonly known as:  VALTREX Take 1 Tab by mouth two (2) times a day. We Performed the Following AMB POC EKG ROUTINE W/ 12 LEADS, INTER & REP [85755 CPT(R)] Follow-up Instructions Return in about 2 weeks (around 4/6/2017), or if symptoms worsen or fail to improve. To-Do List   
 03/23/2017 ECHO:  2D ECHO COMPLETE ADULT (TTE) W OR WO CONTR   
  
 03/23/2017 Lab:  CBC WITH AUTOMATED DIFF   
  
 03/23/2017 Lab:  LIPID PANEL   
  
 03/23/2017 Lab:  METABOLIC PANEL, COMPREHENSIVE   
  
 03/23/2017 Lab:  TSH 3RD GENERATION Patient Instructions Health Maintenance Due Topic Date Due  
 DTaP/Tdap/Td series (1 - Tdap) 05/02/1999  COLONOSCOPY  08/12/2015  INFLUENZA AGE 9 TO ADULT  08/01/2016 Eating Healthy Foods: Care Instructions Your Care Instructions Eating healthy foods can help lower your risk for disease. Healthy food gives you energy and keeps your heart strong, your brain active, your muscles working, and your bones strong.  
A healthy diet includes a variety of foods from the basic food groups: grains, vegetables, fruits, milk and milk products, and meat and beans. Some people may eat more of their favorite foods from only one food group and, as a result, miss getting the nutrients they need. So, it is important to pay attention not only to what you eat but also to what you are missing from your diet. You can eat a healthy, balanced diet by making a few small changes. Follow-up care is a key part of your treatment and safety. Be sure to make and go to all appointments, and call your doctor if you are having problems. Its also a good idea to know your test results and keep a list of the medicines you take. How can you care for yourself at home? Look at what you eat · Keep a food diary for a week or two and record everything you eat or drink. Track the number of servings you eat from each food group. · For a balanced diet every day, eat a variety of: ¨ 6 or more ounce-equivalents of grains, such as cereals, breads, crackers, rice, or pasta, every day. An ounce-equivalent is 1 slice of bread, 1 cup of ready-to-eat cereal, or ½ cup of cooked rice, cooked pasta, or cooked cereal. 
¨ 2½ cups of vegetables, especially: § Dark-green vegetables such as broccoli and spinach. § Orange vegetables such as carrots and sweet potatoes. § Dry beans (such as knowles and kidney beans) and peas (such as lentils). ¨ 2 cups of fresh, frozen, or canned fruit. A small apple or 1 banana or orange equals 1 cup. ¨ 3 cups of nonfat or low-fat milk, yogurt, or other milk products. ¨ 5½ ounces of meat and beans, such as chicken, fish, lean meat, beans, nuts, and seeds. One egg, 1 tablespoon of peanut butter, ½ ounce nuts or seeds, or ¼ cup of cooked beans equals 1 ounce of meat. · Learn how to read food labels for serving sizes and ingredients. Fast-food and convenience-food meals often contain few or no fruits or vegetables. Make sure you eat some fruits and vegetables to make the meal more nutritious. · Look at your food diary. For each food group, add up what you have eaten and then divide the total by the number of days. This will give you an idea of how much you are eating from each food group. See if you can find some ways to change your diet to make it more healthy. Start small · Do not try to make dramatic changes to your diet all at once. You might feel that you are missing out on your favorite foods and then be more likely to fail. · Start slowly, and gradually change your habits. Try some of the following: ¨ Use whole wheat bread instead of white bread. ¨ Use nonfat or low-fat milk instead of whole milk. ¨ Eat brown rice instead of white rice, and eat whole wheat pasta instead of white-flour pasta. ¨ Try low-fat cheeses and low-fat yogurt. ¨ Add more fruits and vegetables to meals and have them for snacks. ¨ Add lettuce, tomato, cucumber, and onion to sandwiches. ¨ Add fruit to yogurt and cereal. 
Enjoy food · You can still eat your favorite foods. You just may need to eat less of them. If your favorite foods are high in fat, salt, and sugar, limit how often you eat them, but do not cut them out entirely. · Eat a wide variety of foods. Make healthy choices when eating out · The type of restaurant you choose can help you make healthy choices. Even fast-food chains are now offering more low-fat or healthier choices on the menu. · Choose smaller portions, or take half of your meal home. · When eating out, try: ¨ A veggie pizza with a whole wheat crust or grilled chicken (instead of sausage or pepperoni). ¨ Pasta with roasted vegetables, grilled chicken, or marinara sauce instead of cream sauce. ¨ A vegetable wrap or grilled chicken wrap. ¨ Broiled or poached food instead of fried or breaded items. Make healthy choices easy · Buy packaged, prewashed, ready-to-eat fresh vegetables and fruits, such as baby carrots, salad mixes, and chopped or shredded broccoli and cauliflower. · Buy packaged, presliced fruits, such as melon or pineapple. · Choose 100% fruit or vegetable juice instead of soda. Limit juice intake to 4 to 6 oz (½ to ¾ cup) a day. · Blend low-fat yogurt, fruit juice, and canned or frozen fruit to make a smoothie for breakfast or a snack. Where can you learn more? Go to http://shayla-mellissa.info/. Enter T756 in the search box to learn more about \"Eating Healthy Foods: Care Instructions. \" Current as of: November 20, 2015 Content Version: 11.1 © 2163-2190 [employer]Guard. Care instructions adapted under license by [employer] (which disclaims liability or warranty for this information). If you have questions about a medical condition or this instruction, always ask your healthcare professional. [address] any warranty or liability for your use of this information. Food as Fuel: Care Instructions Your Care Instructions A healthy, balanced diet gives your body nutrients. Nutrients are like fuel for your body. They give you energy. And they keep your heart beating, your brain active, and your muscles working. They also help to build and strengthen bones, muscles, and other body tissues. Your body needs three major nutrients for energy. These are carbohydrate, protein, and fat. · Carbohydrate provides energy for your brain, muscles, heart, and lungs. It is found in bread, cereal, rice, pasta, fruits, vegetables, milk, yogurt, and sugar. · Protein provides energy and helps build and repair your body's cells. It is found in meat, poultry, fish, cooked dry beans, cheese, tofu and other soy products, nuts and seeds, and milk and milk products. · Fat provides energy, helps build the covering around nerves in your body, and helps make hormones. Fat is found in butter, margarine, oil, mayonnaise, salad dressing, and nuts.  It is also found in most foods that come from animals, such as meat and milk products. Many foods also have fat added to them. Your body needs all three of these nutrients to be healthy. If you choose a good mix of foods, you can help your body get the right amounts of carbohydrate, protein, and fat. It can also keep you at a healthy weight. Follow-up care is a key part of your treatment and safety. Be sure to make and go to all appointments, and call your doctor if you are having problems. It's also a good idea to know your test results and keep a list of the medicines you take. How can you care for yourself at home? Eat a balanced diet · Try to eat variety of healthy foods every day. These include: ¨ 5 to 8 ounces of bread, cereal, crackers, rice, or pasta. An ounce is about 1 slice of bread, ¾ cup of breakfast cereal, or ½ cup of cooked rice, cereal, or pasta. Choose whole-grain products for at least half of your grain servings. ¨ 2 to 3 cups of vegetables. Be sure to include: § Dark green vegetables such as broccoli and spinach. § Orange vegetables such as carrots and sweet potatoes. ¨ 1½ to 2 cups of fresh, frozen, or canned fruit. A banana, an orange, or a large apple equals 1 cup. ¨ 3 cups of nonfat or low-fat milk, yogurt, or other milk products. ¨ 5 to 6½ ounces of protein foods. These include chicken, fish, lean meat, beans, nuts, and seeds. One egg equals 1 ounce of meat, poultry, or fish. A ½ cup of cooked beans equals 2 ounces of protein. Stay fueled all day · Start your day with breakfast. If you don't have time to sit down for a bowl of cereal in the morning, try something that you can eat \"on the go. \" Try a piece of whole wheat bread with peanut butter or a container of yogurt with frozen berries mixed in. · Eat regularly scheduled meals and snacks. If you miss a meal, you may overeat at the next meal. Or you may choose a less healthy snack. · Drink enough water.  (If you have kidney, heart, or liver disease and have to limit fluids, talk with your doctor before you increase your fluid intake.) Where can you learn more? Go to http://shayla-mellissa.info/. Enter M875 in the search box to learn more about \"Food as Fuel: Care Instructions. \" Current as of: July 26, 2016 Content Version: 11.1 © 3996-9911 [employer]vergin. Care instructions adapted under license by [employer] (which disclaims liability or warranty for this information). If you have questions about a medical condition or this instruction, always ask your healthcare professional. [address] any warranty or liability for your use of this information. High Blood Pressure: Care Instructions Your Care Instructions If your blood pressure is usually above 140/90, you have high blood pressure, or hypertension. That means the top number is 140 or higher or the bottom number is 90 or higher, or both. Despite what a lot of people think, high blood pressure usually doesn't cause headaches or make you feel dizzy or lightheaded. It usually has no symptoms. But it does increase your risk for heart attack, stroke, and kidney or eye damage. The higher your blood pressure, the more your risk increases. Your doctor will give you a goal for your blood pressure. Your goal will be based on your health and your age. An example of a goal is to keep your blood pressure below 140/90. Lifestyle changes, such as eating healthy and being active, are always important to help lower blood pressure. You might also take medicine to reach your blood pressure goal. 
Follow-up care is a key part of your treatment and safety. Be sure to make and go to all appointments, and call your doctor if you are having problems. It's also a good idea to know your test results and keep a list of the medicines you take. How can you care for yourself at home? Medical treatment · If you stop taking your medicine, your blood pressure will go back up. You may take one or more types of medicine to lower your blood pressure. Be safe with medicines. Take your medicine exactly as prescribed. Call your doctor if you think you are having a problem with your medicine. · Talk to your doctor before you start taking aspirin every day. Aspirin can help certain people lower their risk of a heart attack or stroke. But taking aspirin isn't right for everyone, because it can cause serious bleeding. · See your doctor regularly. You may need to see the doctor more often at first or until your blood pressure comes down. · If you are taking blood pressure medicine, talk to your doctor before you take decongestants or anti-inflammatory medicine, such as ibuprofen. Some of these medicines can raise blood pressure. · Learn how to check your blood pressure at home. Lifestyle changes · Stay at a healthy weight. This is especially important if you put on weight around the waist. Losing even 10 pounds can help you lower your blood pressure. · If your doctor recommends it, get more exercise. Walking is a good choice. Bit by bit, increase the amount you walk every day. Try for at least 30 minutes on most days of the week. You also may want to swim, bike, or do other activities. · Avoid or limit alcohol. Talk to your doctor about whether you can drink any alcohol. · Try to limit how much sodium you eat to less than 2,300 milligrams (mg) a day. Your doctor may ask you to try to eat less than 1,500 mg a day. · Eat plenty of fruits (such as bananas and oranges), vegetables, legumes, whole grains, and low-fat dairy products. · Lower the amount of saturated fat in your diet. Saturated fat is found in animal products such as milk, cheese, and meat. Limiting these foods may help you lose weight and also lower your risk for heart disease. · Do not smoke. Smoking increases your risk for heart attack and stroke. If you need help quitting, talk to your doctor about stop-smoking programs and medicines. These can increase your chances of quitting for good. When should you call for help? Call 911 anytime you think you may need emergency care. This may mean having symptoms that suggest that your blood pressure is causing a serious heart or blood vessel problem. Your blood pressure may be over 180/110. For example, call 911 if: 
· You have symptoms of a heart attack. These may include: ¨ Chest pain or pressure, or a strange feeling in the chest. 
¨ Sweating. ¨ Shortness of breath. ¨ Nausea or vomiting. ¨ Pain, pressure, or a strange feeling in the back, neck, jaw, or upper belly or in one or both shoulders or arms. ¨ Lightheadedness or sudden weakness. ¨ A fast or irregular heartbeat. · You have symptoms of a stroke. These may include: 
¨ Sudden numbness, tingling, weakness, or loss of movement in your face, arm, or leg, especially on only one side of your body. ¨ Sudden vision changes. ¨ Sudden trouble speaking. ¨ Sudden confusion or trouble understanding simple statements. ¨ Sudden problems with walking or balance. ¨ A sudden, severe headache that is different from past headaches. · You have severe back or belly pain. Do not wait until your blood pressure comes down on its own. Get help right away. Call your doctor now or seek immediate care if: 
· Your blood pressure is much higher than normal (such as 180/110 or higher), but you don't have symptoms. · You think high blood pressure is causing symptoms, such as: ¨ Severe headache. ¨ Blurry vision. Watch closely for changes in your health, and be sure to contact your doctor if: 
· Your blood pressure measures 140/90 or higher at least 2 times. That means the top number is 140 or higher or the bottom number is 90 or higher, or both. · You think you may be having side effects from your blood pressure medicine. · Your blood pressure is usually normal, but it goes above normal at least 2 times. Where can you learn more? Go to http://shayla-mellissa.info/. Enter Y735 in the search box to learn more about \"High Blood Pressure: Care Instructions. \" Current as of: August 8, 2016 Content Version: 11.1 © 0609-3796 [employer]Co. Care instructions adapted under license by [employer] (which disclaims liability or warranty for this information). If you have questions about a medical condition or this instruction, always ask your healthcare professional. [address] any warranty or liability for your use of this information. DASH Diet: Care Instructions Your Care Instructions The DASH diet is an eating plan that can help lower your blood pressure. DASH stands for Dietary Approaches to Stop Hypertension. Hypertension is high blood pressure. The DASH diet focuses on eating foods that are high in calcium, potassium, and magnesium. These nutrients can lower blood pressure. The foods that are highest in these nutrients are fruits, vegetables, low-fat dairy products, nuts, seeds, and legumes. But taking calcium, potassium, and magnesium supplements instead of eating foods that are high in those nutrients does not have the same effect. The DASH diet also includes whole grains, fish, and poultry. The DASH diet is one of several lifestyle changes your doctor may recommend to lower your high blood pressure. Your doctor may also want you to decrease the amount of sodium in your diet. Lowering sodium while following the DASH diet can lower blood pressure even further than just the DASH diet alone. Follow-up care is a key part of your treatment and safety. Be sure to make and go to all appointments, and call your doctor if you are having problems. It's also a good idea to know your test results and keep a list of the medicines you take. How can you care for yourself at home? Following the DASH diet · Eat 4 to 5 servings of fruit each day. A serving is 1 medium-sized piece of fruit, ½ cup chopped or canned fruit, 1/4 cup dried fruit, or 4 ounces (½ cup) of fruit juice. Choose fruit more often than fruit juice. · Eat 4 to 5 servings of vegetables each day. A serving is 1 cup of lettuce or raw leafy vegetables, ½ cup of chopped or cooked vegetables, or 4 ounces (½ cup) of vegetable juice. Choose vegetables more often than vegetable juice. · Get 2 to 3 servings of low-fat and fat-free dairy each day. A serving is 8 ounces of milk, 1 cup of yogurt, or 1 ½ ounces of cheese. · Eat 6 to 8 servings of grains each day. A serving is 1 slice of bread, 1 ounce of dry cereal, or ½ cup of cooked rice, pasta, or cooked cereal. Try to choose whole-grain products as much as possible. · Limit lean meat, poultry, and fish to 2 servings each day. A serving is 3 ounces, about the size of a deck of cards. · Eat 4 to 5 servings of nuts, seeds, and legumes (cooked dried beans, lentils, and split peas) each week. A serving is 1/3 cup of nuts, 2 tablespoons of seeds, or ½ cup of cooked beans or peas. · Limit fats and oils to 2 to 3 servings each day. A serving is 1 teaspoon of vegetable oil or 2 tablespoons of salad dressing. · Limit sweets and added sugars to 5 servings or less a week. A serving is 1 tablespoon jelly or jam, ½ cup sorbet, or 1 cup of lemonade. · Eat less than 2,300 milligrams (mg) of sodium a day. If you limit your sodium to 1,500 mg a day, you can lower your blood pressure even more. Tips for success · Start small. Do not try to make dramatic changes to your diet all at once. You might feel that you are missing out on your favorite foods and then be more likely to not follow the plan. Make small changes, and stick with them. Once those changes become habit, add a few more changes. · Try some of the following: ¨ Make it a goal to eat a fruit or vegetable at every meal and at snacks. This will make it easy to get the recommended amount of fruits and vegetables each day. ¨ Try yogurt topped with fruit and nuts for a snack or healthy dessert. ¨ Add lettuce, tomato, cucumber, and onion to sandwiches. ¨ Combine a ready-made pizza crust with low-fat mozzarella cheese and lots of vegetable toppings. Try using tomatoes, squash, spinach, broccoli, carrots, cauliflower, and onions. ¨ Have a variety of cut-up vegetables with a low-fat dip as an appetizer instead of chips and dip. ¨ Sprinkle sunflower seeds or chopped almonds over salads. Or try adding chopped walnuts or almonds to cooked vegetables. ¨ Try some vegetarian meals using beans and peas. Add garbanzo or kidney beans to salads. Make burritos and tacos with mashed knowles beans or black beans. Where can you learn more? Go to http://shaylaRainKingmellissa.info/. Enter K410 in the search box to learn more about \"DASH Diet: Care Instructions. \" Current as of: March 23, 2016 Content Version: 11.1 © 9348-9826 Invincea, Karma Platform. Care instructions adapted under license by [employer] (which disclaims liability or warranty for this information). If you have questions about a medical condition or this instruction, always ask your healthcare professional. [place] [address] any warranty or liability for your use of this information. Introducing [facility]! Lo Jones introduces mojio patient portal. Now you can access parts of your medical record, email your doctor's office, and request medication refills online. 1. In your internet browser, go to [email or web address] 2. Click on the First Time User? Click Here link in the Sign In box. You will see the New Member Sign Up page. 3. Enter your mojio Access Code exactly as it appears below.  You will not need to use this code after youve completed the sign-up process. If you do not sign up before the expiration date, you must request a new code. · SpecifiedBy Access Code: [email or web address] Expires: 4/6/2017 10:44 AM 
 
4. Enter the last four digits of your Social Security Number (xxxx) and Date of Birth (mm/dd/yyyy) as indicated and click Submit. You will be taken to the next sign-up page. 5. Create a SpecifiedBy ID. This will be your SpecifiedBy login ID and cannot be changed, so think of one that is secure and easy to remember. 6. Create a SpecifiedBy password. You can change your password at any time. 7. Enter your Password Reset Question and Answer. This can be used at a later time if you forget your password. 8. Enter your e-mail address. You will receive e-mail notification when new information is available in [address] 9. Click Sign Up. You can now view and download portions of your medical record. 10. Click the Download Summary menu link to download a portable copy of your medical information. If you have questions, please visit the Frequently Asked Questions section of the SpecifiedBy website. Remember, SpecifiedBy is NOT to be used for urgent needs. For medical emergencies, dial 911. Now available from your iPhone and Android! Please provide this summary of care documentation to your next provider. Your primary care clinician is listed as Maxime Rick. If you have any questions after today's visit, please call [phone].

## 2017-03-24 ENCOUNTER — TELEPHONE (OUTPATIENT)
Dept: INTERNAL MEDICINE CLINIC | Age: 37
End: 2017-03-24

## 2017-03-24 LAB — TSH SERPL DL<=0.005 MIU/L-ACNC: 2.06 MCU/ML (ref 0.27–4.2)

## 2017-03-25 NOTE — PROGRESS NOTES
43 y.o. WHITE OR  male who presents for RPE    He saw NP Chuck Underwood a few days ago for elev bp. Labs neg, ekg showed ST but no acute changes. He is here to f/u and reports that his bp is still running high. Prior to his gastric bypass in 3/10 by Dr Giovani Boyle, we had decided to put him on lisinopril but he apparently didn't take it or didn't take it for very long as his wt loss kicked in and he didn't need it anymore. Got all the was down to sub 200 lb range but, unfortunately, he's gained the weight back. Admits the diet is off and he drinks 8 pepsi's a day(!)  He is interested in seeing a nutritionist    Vitals 3/28/2017 3/23/2017 3/23/2017 1/6/2017 1/6/2016   Weight 276 lb  279 lb 12.8 oz 272 lb 247 lb     Vitals 8/11/2015 5/21/2015 4/7/2014 11/13/2013   Weight 240 lb 231 lb 200 lb 195 lb     No GI or gu complaints currently. Sleep apnea resolved after the weight loss and he does not feel that it's back as yet even w the recent wt gain    Anxiety is controlled, he remains on the Adderall for ADD.     Past Medical History:   Diagnosis Date    ADD  10/11    Allergic rhinitis     Asthma     Chronic pain     Back    Colon polyp     f/u colo age 46 per Dr Jagdeep Edwards from 2014    Depression     FHx: heart disease     GERD (gastroesophageal reflux disease)     Hepatic steatosis     HTN (hypertension) 2010    resolved after gastric bypass    Obesity     Gastric bypass 5/10 Dr. Giovani Boyle BMI 54, peak weight 367; elvis 195 lbs    FRANCISCA on CPAP     resolved after wt loss    Primary hypertension 3/28/2017    Stomach ulcer 2002    Dr. 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 Family History   Problem Relation Age of Onset    Cancer Maternal Grandmother      colon    Stroke Maternal Grandfather     Diabetes Paternal Grandfather     Heart Disease Paternal Grandfather     Cancer Paternal Grandfather      colon    Arthritis-osteo Mother     Heart Disease Mother     Depression Mother     Hypertension Father     Depression Father     Elevated Lipids Father      Immunization History   Administered Date(s) Administered    Influenza Vaccine 11/23/2013    Influenza Vaccine Split 10/05/2011    Influenza Vaccine Whole 11/04/2009, 09/12/2012     Current Outpatient Prescriptions   Medication Sig    lisinopril (PRINIVIL, ZESTRIL) 10 mg tablet Take 1 Tab by mouth daily.  dextroamphetamine-amphetamine (ADDERALL) 30 mg tablet Take 1 Tab by mouth two (2) times a dayEarliest Fill Date: 3/22/17.  escitalopram oxalate (LEXAPRO) 20 mg tablet TAKE 1 TABLET BY MOUTH EVERY DAY    valACYclovir (VALTREX) 500 mg tablet Take 1 Tab by mouth two (2) times a day. No current facility-administered medications for this visit.       No Active Allergies    REVIEW OF SYSTEMS: colo 2005 Dr Clyde Armendariz  Ophtho - no vision change or eye pain  Oral - no mouth pain, tongue or tooth problems  Ears - no hearing loss, ear pain, fullness, no swallowing problems  Cardiac - no CP, PND, orthopnea, edema, palpitations or syncope  Chest - no breast masses  Resp - no wheezing, chronic coughing, dyspnea  GI - no heartburn, nausea, vomiting, change in bowel habits, bleeding, hemorrhoids  Urinary - no dysuria, hematuria, flank pain, urgency, frequency  Genitals - no genital lesions, discharge, masses, ulceration, warts  Ortho - no swelling, dec ROM, myalgias  Derm - no nail abnormalities, rashes, lesions of note, hair loss  Psych - denies any anxiety or depression symptoms, no hallucinations or violent ideation  Constitutional - no wt loss, night sweats, unexplained fevers  Neuro - no focal weakness, numbness, paresthesias, incoordination, ataxia, involuntary movements  Endo - no polyuria, polydipsia, nocturia, hot flashes    Visit Vitals    BP (!) 140/94    Pulse (!) 110    Temp 98.6 °F (37 °C) (Oral)    Ht 5' 8\" (1.727 m)    Wt 276 lb (125.2 kg)    SpO2 96%    BMI 41.97 kg/m2     Affect is appropriate. Mood stable  No apparent distress  HEENT --Anicteric sclerae, tympanic membranes normal,  ear canals normal.  PERRL, EOMI, conjunctiva and lids normal.  Disks were sharp  Sinuses were nontender, turbinates normal, hearing normal.  Oropharynx without  erythema, normal tongue, oral mucosa and tonsils. No thyromegaly, JVD, or bruits. Lungs --Clear to auscultation and percussion, normal percussion. Heart --Regular rate and rhythm, no murmurs, rubs, gallops, or clicks. Chest wall --Nontender to palpation. PMI normal.  Abdomen -- Soft and nontender, no hepatosplenomegaly or masses. Extremities -- Without cyanosis, clubbing, edema. 2+ pulses equally and bilaterally.     LABS  From 10/06 showed gluc 92, cr 0.80,   alt 25, chol 207, tg 207, hdl 36, ldl-c 125, wbc 6.6, hb 14.0, plt 294  From 1/10 showed   gluc 99, cr 0.80, gfr>60,  chol 237, tg 294, hdl 45, ldl-c 133, wbc 9.3, hb 14.1, plt 285, tsh 2.76  From 9/10 showed      chol 172, tg 184, hdl 39, ldl-c 96,                                            b12 515, fol 7.0  From 3/11 showed       chol 188, tg 146, hdl 47, ldl-c 112, wbc 6.9, hb 14.8, plt 253, b12 506, fol 7.6  From 4/14 showed   gluc 82, cr 0.70, gfr>60, alt 9,   chol 170, tg 62,   hdl 63, ldl-c 95,   wbc 5.7, hb 14.6, plt 238, b12 205, fe 90, %sat 27, ferritin 76, vit d 17.4, vit b1 16.4  From 5/15 showed   gluc 93, cr 0.80, gfr>60, alt 7,   chol 214, tg 121, hdl 65, ldl-c 125, wbc 7.9, hb 15.6, plt 268,               vit d 34.8  From 3/17 showed   gluc 64, cr 0.70, gfr>60, alt 18,            wbc 8.8, hb 15.3, plt 314, tsh 2.06    Patient Active Problem List   Diagnosis Code    Obesity s/p gastric bypass Dr. Phyllis Christina 3/10 E66.9    Colon polyp 8/05 K63.5    Anxiety F41.9    Dyslipidemia E78.5    ADD (attention deficit disorder) F98.8    Primary hypertension I10     Assessment and plan:  1. Obesity. He will sched w nutritionist.  Cut out the sodas and we went through the calculations about how many calories that is. Talked at length about exercise and interval training although we'll stabilize his bp and heart rate before starting rigorous program  2. Colon polyp. Fiber, Dr Hima Stein said f/u age 50  4. Anxiety. Continue current  4. ADD. Continue adderall for now  5. Dyslipidemia. Lifestyle and dietary measures as above  6. Hypertension. Trial kali after discussing possible sfx. F/u 3 mos  7. Tachycardia. Check echo and go from there        RTC 6/17    Above conditions discussed at length and patient vocalized understanding.   All questions answered to patient satifaction      TOTAL time 40 min spent of which at least 30 min was on counseling, answering questions and coordination of care

## 2017-03-28 ENCOUNTER — TELEPHONE (OUTPATIENT)
Dept: INTERNAL MEDICINE CLINIC | Age: 37
End: 2017-03-28

## 2017-03-28 ENCOUNTER — OFFICE VISIT (OUTPATIENT)
Dept: INTERNAL MEDICINE CLINIC | Age: 37
End: 2017-03-28

## 2017-03-28 VITALS
BODY MASS INDEX: 41.83 KG/M2 | HEIGHT: 68 IN | TEMPERATURE: 98.6 F | SYSTOLIC BLOOD PRESSURE: 140 MMHG | WEIGHT: 276 LBS | DIASTOLIC BLOOD PRESSURE: 94 MMHG | HEART RATE: 110 BPM | OXYGEN SATURATION: 96 %

## 2017-03-28 DIAGNOSIS — E78.5 DYSLIPIDEMIA: ICD-10-CM

## 2017-03-28 DIAGNOSIS — F98.8 ADD (ATTENTION DEFICIT DISORDER): ICD-10-CM

## 2017-03-28 DIAGNOSIS — K63.5 COLON POLYP: ICD-10-CM

## 2017-03-28 DIAGNOSIS — E66.01 MORBID OBESITY DUE TO EXCESS CALORIES (HCC): ICD-10-CM

## 2017-03-28 DIAGNOSIS — I10 PRIMARY HYPERTENSION: ICD-10-CM

## 2017-03-28 DIAGNOSIS — I10 PRIMARY HYPERTENSION: Primary | ICD-10-CM

## 2017-03-28 RX ORDER — LISINOPRIL 10 MG/1
10 TABLET ORAL DAILY
Qty: 30 TAB | Refills: 11 | Status: SHIPPED | OUTPATIENT
Start: 2017-03-28 | End: 2017-06-19 | Stop reason: SINTOL

## 2017-03-28 NOTE — PROGRESS NOTES
1. Have you been to the ER, urgent care clinic or hospitalized since your last visit? NO.     2. Have you seen or consulted any other health care providers outside of the [address] [place] [address] since your last visit (Include any pap smears or colon screening)? NO      Do you have an Advanced Directive? NO    Would you like information on Advanced Directives?  YES

## 2017-03-28 NOTE — MR AVS SNAPSHOT
Visit Information Date & Time Provider Department Dept. Phone Encounter #  
 3/28/2017 11:30 AM Lexie Sam MD Internist of [address] [place] [address] [phone] [id number] Your Appointments 4/7/2017  9:30 AM  
Office Visit with Danish Ramirez NP Internist of [facility] ([facility]) Appt Note: f/u  
 [address] [facility], [address] [place] [address] [address] [place]  
  
   
 [address], [address]  
  
    
 6/22/2017  9:00 AM  
Office Visit with Lexie Sam MD  
Internist of [address] [place] [address] [facility]) Appt Note: 3 month follow up bp check [address], [address] [facility] [address]  
  
   
 [address], [address] Upcoming Health Maintenance Date Due DTaP/Tdap/Td series (1 - Tdap) 5/2/1999 COLONOSCOPY 8/12/2015 INFLUENZA AGE 9 TO ADULT 8/1/2016 Allergies as of 3/28/2017  Review Complete On: 3/24/2017 By: Danish Ramirez NP No Active Allergies Current Immunizations  Reviewed on 5/21/2015 Name Date Influenza Vaccine 11/23/2013 Influenza Vaccine Split 10/5/2011 Influenza Vaccine Whole 9/12/2012, 11/4/2009 Not reviewed this visit Vitals BP Pulse Temp Height(growth percentile) Weight(growth percentile) SpO2  
 (!) 140/94 (!) 110 98.6 °F (37 °C) (Oral) 5' 8\" (1.727 m) 276 lb (125.2 kg) 96% BMI Smoking Status 41.97 kg/m2 Former Smoker Vitals History BMI and BSA Data Body Mass Index Body Surface Area 41.97 kg/m 2 2.45 m 2 Preferred Pharmacy Pharmacy Name Phone [facility] [address] [facility], [address] [facility] [phone] Your Updated Medication List  
  
   
This list is accurate as of: 3/28/17 12:31 PM.  Always use your most recent med list.  
  
  
  
  
 dextroamphetamine-amphetamine 30 mg tablet Commonly known as:  ADDERALL Take 1 Tab by mouth two (2) times a dayEarliest Fill Date: 3/22/17. escitalopram oxalate 20 mg tablet Commonly known as:  Bhavya Moots TAKE 1 TABLET BY MOUTH EVERY DAY  
  
 valACYclovir 500 mg tablet Commonly known as:  VALTREX Take 1 Tab by mouth two (2) times a day. To-Do List   
 03/31/2017 8:00 AM  
  Appointment with [facility] 1 at [facility] NON-INVASIVE CARD ([phone]) Age Limit for ALL Heart procedures @ all [facility] facilities: 18 yrs and older only. Under the age of 25, refer to [address] [place] [address] ([phone]). This study requires patient to bring a written physician's order or MD office may fax the order to Central Scheduling at [phone]. Patient needs to bring a current list of all medications. No preparation is required for this study. Introducing [facility]! [employer] introduces [employer]fer patient portal. Now you can access parts of your medical record, email your doctor's office, and request medication refills online. 1. In your internet browser, go to [email or web address] 2. Click on the First Time User? Click Here link in the Sign In box. You will see the New Member Sign Up page. 3. Enter your Buxfer Access Code exactly as it appears below. You will not need to use this code after youve completed the sign-up process. If you do not sign up before the expiration date, you must request a new code. · Buxfer Access Code: [email or web address] Expires: 4/6/2017 10:44 AM 
 
4. Enter the last four digits of your Social Security Number (xxxx) and Date of Birth (mm/dd/yyyy) as indicated and click Submit. You will be taken to the next sign-up page. 5. Create a Green Plugt ID. This will be your Buxfer login ID and cannot be changed, so think of one that is secure and easy to remember. 6. Create a [employer] Plug[employer] password. You can change your password at any time. 7. Enter your Password Reset Question and Answer.  This can be used at a later time if you forget your password. 8. Enter your e-mail address. You will receive e-mail notification when new information is available in [address] 9. Click Sign Up. You can now view and download portions of your medical record. 10. Click the Download Summary menu link to download a portable copy of your medical information. If you have questions, please visit the Frequently Asked Questions section of the "[employer] website. Remember, "[employer] is NOT to be used for urgent needs. For medical emergencies, dial 911. Now available from your iPhone and Android! Please provide this summary of care documentation to your next provider. Your primary care clinician is listed as Dawna Stewart. If you have any questions after today's visit, please call [phone].

## 2017-03-31 ENCOUNTER — HOSPITAL ENCOUNTER (OUTPATIENT)
Dept: NON INVASIVE DIAGNOSTICS | Age: 37
Discharge: HOME | End: 2017-03-31
Attending: NURSE PRACTITIONER
Payer: COMMERCIAL

## 2017-03-31 ENCOUNTER — HOSPITAL ENCOUNTER (OUTPATIENT)
Dept: LAB | Age: 37
Discharge: HOME | End: 2017-03-31

## 2017-03-31 DIAGNOSIS — R00.0 TACHYCARDIA: ICD-10-CM

## 2017-03-31 DIAGNOSIS — I10 ESSENTIAL HYPERTENSION: ICD-10-CM

## 2017-03-31 LAB — SENTARA SPECIMEN COL,SENBCF: NORMAL

## 2017-03-31 PROCEDURE — 99001 SPECIMEN HANDLING PT-LAB: CPT | Performed by: INTERNAL MEDICINE

## 2017-03-31 PROCEDURE — 93306 TTE W/DOPPLER COMPLETE: CPT

## 2017-04-01 LAB
25(OH)D3 SERPL-MCNC: 21.7 NG/ML (ref 32–100)
AVG GLU, 10930: 118 MG/DL (ref 91–123)
CHOLEST SERPL-MCNC: 225 MG/DL (ref 110–200)
FE % SATURATION,PSAT: 23 % (ref 20–50)
FERRITIN SERPL-MCNC: 36 NG/ML (ref 22–322)
FOLATE,FOL: 5.79 NG/ML
HBA1C MFR BLD HPLC: 5.7 % (ref 4.8–5.9)
HDLC SERPL-MCNC: 54 MG/DL (ref 40–59)
IRON,IRN: 91 MCG/DL (ref 45–160)
LDLC SERPL CALC-MCNC: 149 MG/DL (ref 50–99)
TIBC,TIBC: 390 MCG/DL (ref 228–428)
TRIGL SERPL-MCNC: 108 MG/DL (ref 40–149)
UIBC SERPL-MCNC: 299 MCG/DL (ref 110–370)
VIT B12 SERPL-MCNC: >2000 PG/ML (ref 211–911)
VLDLC SERPL CALC-MCNC: 22 MG/DL (ref 8–30)

## 2017-04-03 NOTE — PROGRESS NOTES
Please call patient and notify him that his echo is within normal limits, EF 55-60%, no valvular abnormalities. Follow up as scheduled to review in full detail, thanks.

## 2017-04-04 LAB — VITAMIN B1, WHOLE BLOOD, 66250: 179.9 NMOL/L

## 2017-04-04 NOTE — TELEPHONE ENCOUNTER
----- Message from Feliciano Chung NP sent at 4/3/2017  5:35 PM EDT -----  Please call patient and notify him that his echo is within normal limits, EF 55-60%, no valvular abnormalities. Follow up as scheduled to review in full detail, thanks.

## 2017-04-05 ENCOUNTER — TELEPHONE (OUTPATIENT)
Dept: INTERNAL MEDICINE CLINIC | Age: 37
End: 2017-04-05

## 2017-04-06 NOTE — TELEPHONE ENCOUNTER
pls call    Vit b1, b12, fol, iron panel all nl  hba1c 5.7 - ok  Vit d low - start 5000 iu daily otc

## 2017-04-21 DIAGNOSIS — F98.8 ADD (ATTENTION DEFICIT DISORDER): ICD-10-CM

## 2017-04-21 RX ORDER — DEXTROAMPHETAMINE SACCHARATE, AMPHETAMINE ASPARTATE, DEXTROAMPHETAMINE SULFATE AND AMPHETAMINE SULFATE 7.5; 7.5; 7.5; 7.5 MG/1; MG/1; MG/1; MG/1
30 TABLET ORAL 2 TIMES DAILY
Qty: 60 TAB | Refills: 0 | Status: SHIPPED | OUTPATIENT
Start: 2017-04-21 | End: 2017-05-18 | Stop reason: SDUPTHER

## 2017-04-25 ENCOUNTER — HOSPITAL ENCOUNTER (OUTPATIENT)
Dept: NUTRITION | Age: 37
Discharge: HOME | End: 2017-04-25
Payer: COMMERCIAL

## 2017-04-25 PROCEDURE — 97802 MEDICAL NUTRITION INDIV IN: CPT

## 2017-04-25 NOTE — PROGRESS NOTES
[address] [facility]  [address], [place], [address] - Phone: [phone]     Nutrition Assessment - Medical Nutrition Therapy   Outpatient  Initial Evaluation         Patient Name: Parrish Solorio : 1980   Treatment Diagnosis: HTN, s/p GBP Onset Date:    Referral Source: Aureliano Warren MD [place] [facility] [place]): 2017     Ht:  68 in  cm Wt: 267  lb  kg   BMI:   BMR male    BMR female      Diagnosis: See Above        Medications of Nutritional Significance:   Lexapro, Lycynaprin, Vit D, B-12     Subjective/Assessment:  Pt is a 44 yo male who is 8 years s/p Gastric Bypass. Pt did well at first and lowest weight was 187. Pt has gain back to current weight. He understands he does not eat well or as he should. Pt eats high carb and high fat foods. He has an addiction to sweets. He stopped drinking soda a few months ago and has lost wt from that. He would really like help with getting back on track and losing the weight he has gained. Educated pt on needing to be on a ketogenic diet (no more than 50 gm carb per day). Provided pt with diet guidelines along with a one week sample menu. Discussed protein drinks,  beverages from meals, not eating grain products, other foods to not eat, foods to eat. Discussed exercise and provided pt with "[employer] workout pdf and discussed him doing modified versions and starting slow and building up. Also provided pt with a list of vitamins he needs to be taking daily. On a scale of 1-10 pt says he is a an 8 for motivation. Yarelis continue to work with pt to get back on track for weight loss. Also provided HTN education with handout.        Current Eating Patterns: B: pack of nabs and melani's PB cup: coffee 4 sugar packets, creamer  L: sometimes skips or hotdog from [employer] and chips  D: Outback: 8oz steak, cheese fries (splits  S:9pm, chips or Nab pack     Handouts/  Information Provided: [x]  Carbohydrates  [x] Protein  []  Fiber  [x]  Serving Sizes  [x]  Meal and Snack Ideas  [x]  Food Journals []  Diabetes  []  Cholesterol  [x]  Sodium  []  Gen Nutr Guidelines  []  SBGM Guidelines  [x]  Others: 9 month and beyond GBP diet, sample food list, do and do not eat list   Estimate Needs:   Calories: 1200  Protein: 70-90 Carbs: 50 or less Fat: 30-35   Kcal/day  g/day  g/day  g/day         percent:  percent:  percent:      Nutritional Goal - To promote lifestyle changes to result in:    [x]  weight loss  []  Improved diabetic control  []  Decreased cholesterol levels  [x]  Decreased blood pressure  []  weight maintenance []  Preventing any interactions associated with food allergies  []  Adequate weight gain toward goal weight  []  Other:          Recommendations: 1. Separate your drinking from your eating by 30 min before and 30 in after  2. Set up a [employer] food intake tracking profile  3. Follow a very low carb diet, see diet guidelines sheet  4.  Exercise: start with 4 days/week for 30 min (sparticus workout) 30secs per exercise 10 rounds     Information Reviewed with: Patient   Potential Barriers to Learning: none     Dietitian Signature: Jennifer Casper MA RD Date: 4/25/2017   Follow-up: 5-31-17 @1500/ 6-20-17 @ 0830 Time: 1:29 PM

## 2017-05-15 DIAGNOSIS — F98.8 ADD (ATTENTION DEFICIT DISORDER): ICD-10-CM

## 2017-05-15 RX ORDER — DEXTROAMPHETAMINE SACCHARATE, AMPHETAMINE ASPARTATE, DEXTROAMPHETAMINE SULFATE AND AMPHETAMINE SULFATE 7.5; 7.5; 7.5; 7.5 MG/1; MG/1; MG/1; MG/1
30 TABLET ORAL 2 TIMES DAILY
Qty: 60 TAB | Refills: 0 | OUTPATIENT
Start: 2017-05-15

## 2017-05-18 DIAGNOSIS — F98.8 ADD (ATTENTION DEFICIT DISORDER): ICD-10-CM

## 2017-05-18 RX ORDER — DEXTROAMPHETAMINE SACCHARATE, AMPHETAMINE ASPARTATE, DEXTROAMPHETAMINE SULFATE AND AMPHETAMINE SULFATE 7.5; 7.5; 7.5; 7.5 MG/1; MG/1; MG/1; MG/1
30 TABLET ORAL 2 TIMES DAILY
Qty: 60 TAB | Refills: 0 | Status: SHIPPED | OUTPATIENT
Start: 2017-05-18 | End: 2017-06-16 | Stop reason: SDUPTHER

## 2017-06-16 DIAGNOSIS — F98.8 ADD (ATTENTION DEFICIT DISORDER): ICD-10-CM

## 2017-06-16 RX ORDER — DEXTROAMPHETAMINE SACCHARATE, AMPHETAMINE ASPARTATE, DEXTROAMPHETAMINE SULFATE AND AMPHETAMINE SULFATE 7.5; 7.5; 7.5; 7.5 MG/1; MG/1; MG/1; MG/1
30 TABLET ORAL 2 TIMES DAILY
Qty: 60 TAB | Refills: 0 | Status: SHIPPED | OUTPATIENT
Start: 2017-06-16 | End: 2017-07-13 | Stop reason: SDUPTHER

## 2017-06-19 ENCOUNTER — OFFICE VISIT (OUTPATIENT)
Dept: INTERNAL MEDICINE CLINIC | Age: 37
End: 2017-06-19

## 2017-06-19 VITALS
OXYGEN SATURATION: 98 % | RESPIRATION RATE: 15 BRPM | SYSTOLIC BLOOD PRESSURE: 118 MMHG | TEMPERATURE: 98.7 F | DIASTOLIC BLOOD PRESSURE: 84 MMHG | HEIGHT: 68 IN | HEART RATE: 108 BPM | WEIGHT: 276.6 LBS | BODY MASS INDEX: 41.92 KG/M2

## 2017-06-19 DIAGNOSIS — E66.01 MORBID OBESITY DUE TO EXCESS CALORIES (HCC): Primary | ICD-10-CM

## 2017-06-19 DIAGNOSIS — I10 PRIMARY HYPERTENSION: ICD-10-CM

## 2017-06-19 RX ORDER — VALACYCLOVIR HYDROCHLORIDE 500 MG/1
500 TABLET, FILM COATED ORAL 2 TIMES DAILY
Qty: 60 TAB | Refills: 11 | Status: SHIPPED | OUTPATIENT
Start: 2017-06-19 | End: 2018-09-11 | Stop reason: SDUPTHER

## 2017-06-19 RX ORDER — LOSARTAN POTASSIUM 25 MG/1
25 TABLET ORAL DAILY
Qty: 30 TAB | Refills: 11 | Status: SHIPPED | OUTPATIENT
Start: 2017-06-19 | End: 2018-07-01 | Stop reason: SDUPTHER

## 2017-06-19 NOTE — MR AVS SNAPSHOT
Visit Information Date & Time Provider Department Dept. Phone Encounter #  
 6/19/2017  2:30 PM Maryan Arreguin MD Internist of [address] [place] [address] Road [phone] [id number] Your Appointments 6/22/2017  9:00 AM  
Office Visit with Maryan Arreguin MD  
Internist of [address] [place] [address] [address] [facility]) Appt Note: 3 month follow up bp check [address] [facility], [address] [address] [address] [place]  
  
   
 [address], [address] Upcoming Health Maintenance Date Due DTaP/Tdap/Td series (1 - Tdap) 6/29/2017* INFLUENZA AGE 9 TO ADULT 8/1/2017 COLONOSCOPY 3/28/2027 *Topic was postponed. The date shown is not the original due date. Allergies as of 6/19/2017  Review Complete On: 6/19/2017 By: Champ Amado No Active Allergies Current Immunizations  Reviewed on 5/21/2015 Name Date Influenza Vaccine 11/23/2013 Influenza Vaccine Split 10/5/2011 Influenza Vaccine Whole 9/12/2012, 11/4/2009 Not reviewed this visit You Were Diagnosed With   
  
 Codes Comments Morbid obesity due to excess calories ([address]    -  Primary ICD-10-CM: E66.01 
ICD-9-CM: 278.01 Vitals BP Pulse Temp Resp Height(growth percentile) Weight(growth percentile) 118/84 (BP 1 Location: Right arm, BP Patient Position: Sitting) (!) 108 98.7 °F (37.1 °C) (Oral) 15 5' 8\" (1.727 m) 276 lb 9.6 oz (125.5 kg) SpO2 BMI Smoking Status 98% 42.06 kg/m2 Former Smoker Vitals History BMI and BSA Data Body Mass Index Body Surface Area 42.06 kg/m 2 2.45 m 2 Preferred Pharmacy Pharmacy Name Phone [facility] [address] [facility], [address] [facility] [phone] Your Updated Medication List  
  
   
This list is accurate as of: 6/19/17  3:35 PM.  Always use your most recent med list.  
  
  
  
  
 dextroamphetamine-amphetamine 30 mg tablet Commonly known as:  ADDERALL Take 1 Tab by mouth two (2) times a dayEarliest Fill Date: 6/16/17. escitalopram oxalate 20 mg tablet Commonly known as:  Raghav Lance TAKE 1 TABLET BY MOUTH EVERY DAY  
  
 lisinopril 10 mg tablet Commonly known as:  Barbmirian Plunk Take 1 Tab by mouth daily. * phentermine-topiramate 3.75-23 mg Cm24 Commonly known as:  QSYMIA Take 1 Tab by mouth daily. Max Daily Amount: 1 Tab. * phentermine-topiramate 7.5-46 mg Cm24 Commonly known as:  QSYMIA Take 1 Tab by mouth daily. Max Daily Amount: 1 Tab. valACYclovir 500 mg tablet Commonly known as:  VALTREX Take 1 Tab by mouth two (2) times a day. * Notice: This list has 2 medication(s) that are the same as other medications prescribed for you. Read the directions carefully, and ask your doctor or other care provider to review them with you. Prescriptions Printed Refills  
 phentermine-topiramate (QSYMIA) 3.75-23 mg CM24 0 Sig: Take 1 Tab by mouth daily. Max Daily Amount: 1 Tab. Class: Print Route: Oral  
 phentermine-topiramate (QSYMIA) 7.5-46 mg CM24 5 Sig: Take 1 Tab by mouth daily. Max Daily Amount: 1 Tab. Class: Print Route: Oral  
  
Prescriptions Sent to Pharmacy Refills  
 valACYclovir (VALTREX) 500 mg tablet 11 Sig: Take 1 Tab by mouth two (2) times a day. Class: Normal  
 Pharmacy: [id number] [address] #: [phone] Route: Oral  
  
To-Do List   
 06/20/2017 8:30 AM  
  Appointment with Palma Martinez RD at [address] [place] [address] Please provide this summary of care documentation to your next provider. Your primary care clinician is listed as Juan Jose Polanco. If you have any questions after today's visit, please call [phone].

## 2017-06-19 NOTE — PROGRESS NOTES
42 y.o. WHITE OR  male who presents for evaluation. He's here asking to be put on qsymia    We saw him 3/28/17 and ended up sending him to the nutritionist.  He learned a lot but has been unable to stay on the prescribed diet more than 5 days at a time. He's been exercising at least the last 3 weeks, cut out the sodas from his diet. His dad took qsymia and he wants to try it    Secondly, he's been having dry cough and wants to change away from [facility]    Past Medical History:   Diagnosis Date    ADD  10/11    Allergic rhinitis     Asthma     Chronic back pain     Colon polyp     f/u colo age 46 per Dr Rudi Cowan from 2014    Depression     FHx: heart disease     GERD (gastroesophageal reflux disease)     Hepatic steatosis     HTN (hypertension) 2010    resolved after gastric bypass    Obesity     Gastric bypass 5/10 Dr. Kwok Postin BMI 47, peak weight 367; elvis 195 lbs    FRANCISCA on CPAP     resolved after wt loss    Primary hypertension 3/28/2017    Stomach ulcer 2002    Dr. Rudi Cowan    Ulcerative proctitis ([address]     WRA-Back, Hips, Feet      Current Outpatient Prescriptions   Medication Sig    valACYclovir (VALTREX) 500 mg tablet Take 1 Tab by mouth two (2) times a day.  phentermine-topiramate (QSYMIA) 3.75-23 mg CM24 Take 1 Tab by mouth daily. Max Daily Amount: 1 Tab.  phentermine-topiramate (QSYMIA) 7.5-46 mg CM24 Take 1 Tab by mouth daily. Max Daily Amount: 1 Tab.  losartan (COZAAR) 25 mg tablet Take 1 Tab by mouth daily.  dextroamphetamine-amphetamine (ADDERALL) 30 mg tablet Take 1 Tab by mouth two (2) times a dayEarliest Fill Date: 6/16/17.  escitalopram oxalate (LEXAPRO) 20 mg tablet TAKE 1 TABLET BY MOUTH EVERY DAY     No current facility-administered medications for this visit.       Allergies   Allergen Reactions    Lisinopril Cough     REVIEW OF SYSTEMS:   Ophtho - no vision change or eye pain  Oral - no mouth pain, tongue or tooth problems  Ears - no hearing loss, ear pain, fullness, no swallowing problems  Cardiac - no CP, PND, orthopnea, edema, palpitations or syncope  Chest - no breast masses  Resp - no wheezing, chronic coughing, dyspnea  GI - no heartburn, nausea, vomiting, change in bowel habits, bleeding, hemorrhoids  Urinary - no dysuria, hematuria, flank pain, urgency, frequency  Genitals - no genital lesions, discharge, masses, ulceration, warts  Ortho - no swelling, dec ROM, myalgias    Visit Vitals    /84 (BP 1 Location: Right arm, BP Patient Position: Sitting)    Pulse (!) 108    Temp 98.7 °F (37.1 °C) (Oral)    Resp 15    Ht 5' 8\" (1.727 m)    Wt 276 lb 9.6 oz (125.5 kg)    SpO2 98%    BMI 42.06 kg/m2   A&O x3  Affect is appropriate. Mood stable  No apparent distress  Anicteric, no JVD, adenopathy or thyromegaly. No carotid bruits or radiated murmur  Lungs clear to auscultation, no wheezes or rales  Heart showed regular rate and rhythm. No murmur, rubs, gallops  Abdomen soft nontender, no hepatosplenomegaly or masses. Extremities without edema. Pulses 1-2+ symmetrically    Assessment and plan:  1. Morbid obesity. Continue current dietary and lifestyle measures. Long discussion about qsymia, belviq, contrave, liraglutide along w brief mention of orlistat and phentermine including potential benefits and side effects. Not interested in medically supervised intensive wt loss program.  He was given qsymia, sfx reinforced, he will come back in 2-3 mos w labs and go from there, call if w adverse reactions  2. HTN. Change to losartan        Above conditions discussed at length and patient vocalized understanding.   All questions answered to patient satifaction

## 2017-07-13 DIAGNOSIS — F98.8 ADD (ATTENTION DEFICIT DISORDER): ICD-10-CM

## 2017-07-13 RX ORDER — DEXTROAMPHETAMINE SACCHARATE, AMPHETAMINE ASPARTATE, DEXTROAMPHETAMINE SULFATE AND AMPHETAMINE SULFATE 7.5; 7.5; 7.5; 7.5 MG/1; MG/1; MG/1; MG/1
30 TABLET ORAL 2 TIMES DAILY
Qty: 60 TAB | Refills: 0 | Status: SHIPPED | OUTPATIENT
Start: 2017-07-13 | End: 2017-08-11 | Stop reason: SDUPTHER

## 2017-07-25 ENCOUNTER — TELEPHONE (OUTPATIENT)
Dept: INTERNAL MEDICINE CLINIC | Age: 37
End: 2017-07-25

## 2017-07-25 NOTE — TELEPHONE ENCOUNTER
Pt calling, says RD put him on Qsymia. Says he has been having diarrhea about 3 days a week in the morning since starting the med. Is this a normal side effect?

## 2017-08-11 DIAGNOSIS — F98.8 ADD (ATTENTION DEFICIT DISORDER): ICD-10-CM

## 2017-08-11 RX ORDER — DEXTROAMPHETAMINE SACCHARATE, AMPHETAMINE ASPARTATE, DEXTROAMPHETAMINE SULFATE AND AMPHETAMINE SULFATE 7.5; 7.5; 7.5; 7.5 MG/1; MG/1; MG/1; MG/1
30 TABLET ORAL 2 TIMES DAILY
Qty: 60 TAB | Refills: 0 | Status: SHIPPED | OUTPATIENT
Start: 2017-08-11 | End: 2017-09-07 | Stop reason: SDUPTHER

## 2017-09-07 DIAGNOSIS — F98.8 ADD (ATTENTION DEFICIT DISORDER): ICD-10-CM

## 2017-09-07 RX ORDER — DEXTROAMPHETAMINE SACCHARATE, AMPHETAMINE ASPARTATE, DEXTROAMPHETAMINE SULFATE AND AMPHETAMINE SULFATE 7.5; 7.5; 7.5; 7.5 MG/1; MG/1; MG/1; MG/1
30 TABLET ORAL 2 TIMES DAILY
Qty: 60 TAB | Refills: 0 | Status: SHIPPED | OUTPATIENT
Start: 2017-09-07 | End: 2017-10-03 | Stop reason: SDUPTHER

## 2017-09-21 ENCOUNTER — OFFICE VISIT (OUTPATIENT)
Dept: INTERNAL MEDICINE CLINIC | Age: 37
End: 2017-09-21

## 2017-09-26 ENCOUNTER — OFFICE VISIT (OUTPATIENT)
Dept: INTERNAL MEDICINE CLINIC | Age: 37
End: 2017-09-26

## 2017-09-26 VITALS
SYSTOLIC BLOOD PRESSURE: 126 MMHG | TEMPERATURE: 98.6 F | HEIGHT: 68 IN | BODY MASS INDEX: 43.13 KG/M2 | HEART RATE: 61 BPM | DIASTOLIC BLOOD PRESSURE: 84 MMHG | RESPIRATION RATE: 14 BRPM | WEIGHT: 284.6 LBS | OXYGEN SATURATION: 96 %

## 2017-09-26 DIAGNOSIS — K63.5 HYPERPLASTIC COLONIC POLYP, UNSPECIFIED PART OF COLON: ICD-10-CM

## 2017-09-26 DIAGNOSIS — E78.5 DYSLIPIDEMIA: ICD-10-CM

## 2017-09-26 DIAGNOSIS — F41.9 ANXIETY: ICD-10-CM

## 2017-09-26 DIAGNOSIS — Z98.84 S/P GASTRIC BYPASS: Primary | ICD-10-CM

## 2017-09-26 DIAGNOSIS — F98.8 ADD (ATTENTION DEFICIT DISORDER): ICD-10-CM

## 2017-09-26 DIAGNOSIS — E66.01 MORBID OBESITY WITH BMI OF 40.0-44.9, ADULT (HCC): ICD-10-CM

## 2017-09-26 DIAGNOSIS — I10 PRIMARY HYPERTENSION: ICD-10-CM

## 2017-09-26 NOTE — PROGRESS NOTES
42 y.o. WHITE OR  male who presents for     He was started on [address] March but had cough so now on losartan with good control and no sfx    He was started on qsymia in June but it 'didn't do much'. No sfx. He admits the diet is bad, he's stress eating some and not exercising enoough. Previously declined med supervised wt loss but appears receptive to it today. He had previously seen the dietician earlier in the summer also    Vitals 9/26/2017 6/19/2017 3/28/2017 3/23/2017 3/23/2017   Weight 284 lb 9.6 oz 276 lb 9.6 oz 276 lb  279 lb 12.8 oz     No GI or gu complaints currently. Sleep apnea resolved after the weight loss and he does not feel that it's back as yet even w the recent wt gain    Anxiety is controlled, he remains on the Adderall for ADD.     Past Medical History:   Diagnosis Date    ADD  10/11    Allergic rhinitis     Asthma     Chronic back pain     Colon polyp     f/u colo age 46 per Dr Celia Valenzuela from 2014    Depression     FHx: heart disease     GERD (gastroesophageal reflux disease)     Hepatic steatosis     HTN (hypertension) 2010    resolved after gastric bypass    Obesity     Gastric bypass 5/10 Dr. Williamson Check BMI 54, peak weight 367; elvis 195 lbs    FRANCISCA on CPAP     resolved after wt loss    Primary hypertension 3/28/2017    Stomach ulcer 2002    Dr. Ashvin Tinajero Ulcerative proctitis ([facility] [address]     WRA-Back, Hips, Feet      Past Surgical History:   Procedure Laterality Date    BIOPSY LIVER      Dr Williamson Check 2010   Daniel Holes  03/2017    echo nl lv, ef 60%, mild aortic root dilation    HX BUNIONECTOMY      Dr Peter Garza  9/08    Dr Weiss Scale      2002 negative, last 2005 proctitis Dr Matt Elizalde  3/10    Lap Cherri-en-Y BMI 47    HX ORTHOPAEDIC      left hip Dr Jessie Burnham age 13     Social History     Social History    Marital status:      Spouse name: N/A    Number of children: 2    Years of education: N/A Occupational History    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 Current Outpatient Prescriptions   Medication Sig    phentermine-topiramate (QSYMIA) 15-92 mg CM24 Take  by mouth daily.  dextroamphetamine-amphetamine (ADDERALL) 30 mg tablet Take 1 Tab by mouth two (2) times a dayEarliest Fill Date: 9/7/17.  valACYclovir (VALTREX) 500 mg tablet Take 1 Tab by mouth two (2) times a day.  losartan (COZAAR) 25 mg tablet Take 1 Tab by mouth daily.  escitalopram oxalate (LEXAPRO) 20 mg tablet TAKE 1 TABLET BY MOUTH EVERY DAY     No current facility-administered medications for this visit.       Allergies   Allergen Reactions    Lisinopril Cough       REVIEW OF SYSTEMS: colo 2005 Dr Sahara Pelayo  Ophtho - no vision change or eye pain  Oral - no mouth pain, tongue or tooth problems  Ears - no hearing loss, ear pain, fullness, no swallowing problems  Cardiac - no CP, PND, orthopnea, edema, palpitations or syncope  Chest - no breast masses  Resp - no wheezing, chronic coughing, dyspnea  GI - no heartburn, nausea, vomiting, change in bowel habits, bleeding, hemorrhoids  Urinary - no dysuria, hematuria, flank pain, urgency, frequency  Genitals - no genital lesions, discharge, masses, ulceration, warts  Ortho - no swelling, dec ROM, myalgias  Derm - no nail abnormalities, rashes, lesions of note, hair loss  Psych - denies any anxiety or depression symptoms, no hallucinations or violent ideation  Constitutional - no wt loss, night sweats, unexplained fevers  Neuro - no focal weakness, numbness, paresthesias, incoordination, ataxia, involuntary movements  Endo - no polyuria, polydipsia, nocturia, hot flashes    Visit Vitals  /84 (BP 1 Location: Left arm, BP Patient Position: Sitting)    Pulse 61    Temp 98.6 °F (37 °C) (Oral)    Resp 14    Ht 5' 8\" (1.727 m)    Wt 284 lb 9.6 oz (129.1 kg)    SpO2 96%    BMI 43.27 kg/m2     Affect is appropriate. Mood stable  No apparent distress  HEENT --Anicteric sclerae, . No thyromegaly, JVD, or bruits. Lungs --Clear to auscultation and percussion, normal percussion. Heart --Regular rate and rhythm, no murmurs, rubs, gallops, or clicks. Chest wall --Nontender to palpation. PMI normal.  Abdomen -- Soft and nontender, no hepatosplenomegaly or masses. Extremities -- Without cyanosis, clubbing, edema. 2+ pulses equally and bilaterally.     LABS  From 10/06 showed gluc 92, cr 0.80,   alt 25,         chol 207, tg 207, hdl 36, ldl-c 125, wbc 6.6, hb 14.0, plt 294  From 1/10 showed   gluc 99, cr 0.80, gfr>60,         chol 237, tg 294, hdl 45, ldl-c 133, wbc 9.3, hb 14.1, plt 285, tsh 2.76  From 9/10 showed             chol 172, tg 184, hdl 39, ldl-c 96,                                                    b12 515, fol 7.0  From 3/11 showed              chol 188, tg 146, hdl 47, ldl-c 112, wbc 6.9, hb 14.8, plt 253,                                b12 506, fol 7.6  From 4/14 showed   gluc 82, cr 0.70, gfr>60, alt 9,          chol 170, tg 62,   hdl 63, ldl-c 95,   wbc 5.7, hb 14.6, plt 238,            fe 90, %sat 27, ferritin 76, b12 205,        vit d 17.4, vit b1 16.4  From 5/15 showed   gluc 93, cr 0.80, gfr>60, alt 7,          chol 214, tg 121, hdl 65, ldl-c 125, wbc 7.9, hb 15.6, plt 268,                                          vit d 34.8  From 3/17 showed   gluc 64, cr 0.70, gfr>60, alt 18,               wbc 8.8, hb 15.3, plt 314, tsh 2.06  From 3/17 showed      hba1c 5.7, chol 225, tg 108, hdl 54, ldl-c 149,                      fe 91, %sat 23, ferritin 36, b12>2k, fol 5.8, vit d 21.7, vit b1 180    Patient Active Problem List   Diagnosis Code    Colon polyp 8/05 K63.5    Anxiety F41.9    Dyslipidemia E78.5    ADD (attention deficit disorder) F98.8    Primary hypertension I10    S/P gastric bypass 3/10 Dr Triny Garcia Z98.84    Morbid obesity with BMI of 40.0-44.9, adult [facility] E66.01, Z68.41     Assessment and plan:  1. Obesity. Will refer to VLCD program.  Inc qsymia to 15, f/u 3 mos  2. Colon polyp. Fiber, Dr Alize Ramos said f/u age 50  4. Anxiety. Continue current  4. ADD. Continue adderall for now  5. Dyslipidemia. Lifestyle and dietary measures as above  6. Hypertension. Continue current regimen. RTC 1/18    Above conditions discussed at length and patient vocalized understanding.   All questions answered to patient satifaction

## 2017-09-26 NOTE — PROGRESS NOTES
1. Have you been to the ER, urgent care clinic or hospitalized since your last visit? NO.     2. Have you seen or consulted any other health care providers outside of the [address] [place] [address] since your last visit (Include any pap smears or colon screening)? NO      Do you have an Advanced Directive? NO    Would you like information on Advanced Directives?  NO

## 2017-09-26 NOTE — MR AVS SNAPSHOT
Visit Information Date & Time Provider Department Dept. Phone Encounter #  
 9/26/2017  9:00 AM Mago Rogel MD Internist of [address] [place] [address] Road [phone] [id number] Your Appointments 12/27/2017  8:00 AM  
Office Visit with Mago Rogel MD  
Internist of [address] [place] [address] [address] [facility] [place] [facility]) Appt Note: 3 month follow up for weight loss [address] [facility], [address] [place]  
  
   
 [address], [facility] Upcoming Health Maintenance Date Due DTaP/Tdap/Td series (1 - Tdap) 5/2/1999 INFLUENZA AGE 9 TO ADULT 8/1/2017 COLONOSCOPY 3/28/2027 Allergies as of 9/26/2017  Review Complete On: 9/26/2017 By: Rosamaria Lesch Severity Noted Reaction Type Reactions Lisinopril  06/19/2017    Cough Current Immunizations  Reviewed on 5/21/2015 Name Date Influenza Vaccine 11/23/2013 Influenza Vaccine Split 10/5/2011 Influenza Vaccine Whole 9/12/2012, 11/4/2009 Not reviewed this visit You Were Diagnosed With   
  
 Codes Comments S/P gastric bypass    -  Primary ICD-10-CM: O74.97 ICD-9-CM: V45.86 Morbid obesity with BMI of 40.0-44.9, adult (HCC)     ICD-10-CM: E66.01, Z68.41 
ICD-9-CM: 278.01, V85.41 Vitals BP Pulse Temp Resp Height(growth percentile) Weight(growth percentile) 120/90 (BP 1 Location: Left arm, BP Patient Position: Sitting) 61 98.6 °F (37 °C) (Oral) 14 5' 8\" (1.727 m) 284 lb 9.6 oz (129.1 kg) SpO2 BMI Smoking Status 96% 43.27 kg/m2 Former Smoker Vitals History BMI and BSA Data Body Mass Index Body Surface Area  
 43.27 kg/m 2 2.49 m 2 Preferred Pharmacy Pharmacy Name Phone [facility] [address] [facility], [address] [phone] Your Updated Medication List  
  
   
This list is accurate as of: 9/26/17  9:32 AM.  Always use your most recent med list.  
  
  
  
  
 dextroamphetamine-amphetamine 30 mg tablet Commonly known as:  ADDERALL Take 1 Tab by mouth two (2) times a dayEarliest Fill Date: 9/7/17. escitalopram oxalate 20 mg tablet Commonly known as:  Shimon Carolus TAKE 1 TABLET BY MOUTH EVERY DAY  
  
 losartan 25 mg tablet Commonly known as:  COZAAR Take 1 Tab by mouth daily. valACYclovir 500 mg tablet Commonly known as:  VALTREX Take 1 Tab by mouth two (2) times a day. Please provide this summary of care documentation to your next provider. Your primary care clinician is listed as Verenice Waller. If you have any questions after today's visit, please call [phone].

## 2017-10-03 RX ORDER — DEXTROAMPHETAMINE SACCHARATE, AMPHETAMINE ASPARTATE, DEXTROAMPHETAMINE SULFATE AND AMPHETAMINE SULFATE 7.5; 7.5; 7.5; 7.5 MG/1; MG/1; MG/1; MG/1
30 TABLET ORAL 2 TIMES DAILY
Qty: 60 TAB | Refills: 0 | Status: SHIPPED | OUTPATIENT
Start: 2017-10-03 | End: 2017-11-01 | Stop reason: SDUPTHER

## 2017-10-03 RX ORDER — ESCITALOPRAM OXALATE 20 MG/1
TABLET ORAL
Qty: 90 TAB | Refills: 3 | Status: SHIPPED | OUTPATIENT
Start: 2017-10-03 | End: 2018-05-03

## 2017-10-03 NOTE — TELEPHONE ENCOUNTER
Refill req for lexapro, also refill req for adderall , he needs to get a printed script on 10/05/17 for adderall he is going out of town, pt is at [phone], RD

## 2017-11-01 NOTE — TELEPHONE ENCOUNTER
[facility] for [facility]der[facility] as 10/05/2017 for a 30 day supply. There appears to be no inconsistencies in regards to the prescribing of this medication. Last Visit: 09/26/2017 with MD Rui Boss    Next Appointment: 12/27/2017 with MD Rui Boss   Previous Refill Encounters: 10/03/2017 per MD Rui Boss #[id number]     Requested Prescriptions     Pending Prescriptions Disp Refills    dextroamphetamine-amphetamine (ADDERALL) 30 mg tablet 60 Tab 0     Sig: Take 1 Tab by mouth two (2) times a dayEarliest Fill Date: 11/1/17.

## 2017-11-02 RX ORDER — DEXTROAMPHETAMINE SACCHARATE, AMPHETAMINE ASPARTATE, DEXTROAMPHETAMINE SULFATE AND AMPHETAMINE SULFATE 7.5; 7.5; 7.5; 7.5 MG/1; MG/1; MG/1; MG/1
30 TABLET ORAL 2 TIMES DAILY
Qty: 60 TAB | Refills: 0 | Status: SHIPPED | OUTPATIENT
Start: 2017-11-02 | End: 2017-12-04 | Stop reason: SDUPTHER

## 2017-12-02 PROBLEM — F90.0 ATTENTION DEFICIT HYPERACTIVITY DISORDER (ADHD), PREDOMINANTLY INATTENTIVE TYPE: Status: ACTIVE | Noted: 2017-12-02

## 2017-12-02 PROBLEM — F33.1 MODERATE EPISODE OF RECURRENT MAJOR DEPRESSIVE DISORDER (HCC): Status: ACTIVE | Noted: 2017-12-02

## 2017-12-04 RX ORDER — DEXTROAMPHETAMINE SACCHARATE, AMPHETAMINE ASPARTATE, DEXTROAMPHETAMINE SULFATE AND AMPHETAMINE SULFATE 7.5; 7.5; 7.5; 7.5 MG/1; MG/1; MG/1; MG/1
30 TABLET ORAL 2 TIMES DAILY
Qty: 60 TAB | Refills: 0 | Status: SHIPPED | OUTPATIENT
Start: 2017-12-04 | End: 2018-01-29 | Stop reason: SDUPTHER

## 2017-12-04 NOTE — TELEPHONE ENCOUNTER
[facility] last [facility] date for Adderall as 11/03/2017 for a 30 d/s. There appears to be no inconsistencies in regards to the prescribing of this medication. Last Visit: 09/26/2017 with MD Juan Maria    Next Appointment: 12/27/2017 with MD Juan Maria   Previous Refill Encounters: 11/02/2017 per MD Juan Maria #60     Requested Prescriptions     Pending Prescriptions Disp Refills    dextroamphetamine-amphetamine (ADDERALL) 30 mg tablet 60 Tab 0     Sig: Take 1 Tab by mouth two (2) times a dayEarliest Fill Date: 12/4/17.

## 2018-01-02 ENCOUNTER — HOSPITAL ENCOUNTER (OUTPATIENT)
Dept: GENERAL RADIOLOGY | Age: 38
Discharge: HOME | End: 2018-01-02
Payer: COMMERCIAL

## 2018-01-02 ENCOUNTER — OFFICE VISIT (OUTPATIENT)
Dept: INTERNAL MEDICINE CLINIC | Age: 38
End: 2018-01-02

## 2018-01-02 VITALS
HEIGHT: 68 IN | DIASTOLIC BLOOD PRESSURE: 80 MMHG | WEIGHT: 282 LBS | OXYGEN SATURATION: 97 % | SYSTOLIC BLOOD PRESSURE: 116 MMHG | TEMPERATURE: 98.7 F | HEART RATE: 115 BPM | RESPIRATION RATE: 14 BRPM | BODY MASS INDEX: 42.74 KG/M2

## 2018-01-02 DIAGNOSIS — M79.644 BILATERAL THUMB PAIN: ICD-10-CM

## 2018-01-02 DIAGNOSIS — M79.645 PAIN IN FINGER OF BOTH HANDS: ICD-10-CM

## 2018-01-02 DIAGNOSIS — G56.01 CARPAL TUNNEL SYNDROME, RIGHT: ICD-10-CM

## 2018-01-02 DIAGNOSIS — M79.645 BILATERAL THUMB PAIN: ICD-10-CM

## 2018-01-02 DIAGNOSIS — G89.29 CHRONIC RIGHT-SIDED LOW BACK PAIN WITHOUT SCIATICA: ICD-10-CM

## 2018-01-02 DIAGNOSIS — M79.671 RIGHT FOOT PAIN: ICD-10-CM

## 2018-01-02 DIAGNOSIS — M79.644 PAIN IN FINGER OF BOTH HANDS: ICD-10-CM

## 2018-01-02 DIAGNOSIS — M54.2 NECK PAIN: Primary | ICD-10-CM

## 2018-01-02 DIAGNOSIS — M54.2 NECK PAIN: ICD-10-CM

## 2018-01-02 DIAGNOSIS — M54.50 CHRONIC RIGHT-SIDED LOW BACK PAIN WITHOUT SCIATICA: ICD-10-CM

## 2018-01-02 DIAGNOSIS — M54.12 CERVICAL RADICULOPATHY AT C6: ICD-10-CM

## 2018-01-02 PROCEDURE — 73120 X-RAY EXAM OF HAND: CPT

## 2018-01-02 PROCEDURE — 72050 X-RAY EXAM NECK SPINE 4/5VWS: CPT

## 2018-01-02 PROCEDURE — 72100 X-RAY EXAM L-S SPINE 2/3 VWS: CPT

## 2018-01-02 RX ORDER — METHYLPREDNISOLONE 4 MG/1
TABLET ORAL
Qty: 1 DOSE PACK | Refills: 0 | Status: SHIPPED | OUTPATIENT
Start: 2018-01-02 | End: 2018-05-03 | Stop reason: ALTCHOICE

## 2018-01-02 RX ORDER — BUPROPION HYDROCHLORIDE 75 MG/1
TABLET ORAL DAILY
COMMUNITY
End: 2018-05-03

## 2018-01-02 NOTE — MR AVS SNAPSHOT
Visit Information Date & Time Provider Department Dept. Phone Encounter #  
 1/2/2018  3:30 PM Kate Land MD Internists of [address] [place] [address] Road  [id number] Upcoming Health Maintenance Date Due DTaP/Tdap/Td series (1 - Tdap) 5/2/1999 Influenza Age 5 to Adult 8/1/2017 COLONOSCOPY 3/28/2027 Allergies as of 1/2/2018  Review Complete On: 1/2/2018 By: Kate Land MD  
  
 Severity Noted Reaction Type Reactions Lisinopril  06/19/2017    Cough Current Immunizations  Reviewed on 5/21/2015 Name Date Influenza Vaccine 11/23/2013 Influenza Vaccine Split 10/5/2011 Influenza Vaccine Whole 9/12/2012, 11/4/2009 Not reviewed this visit You Were Diagnosed With   
  
 Codes Comments Neck pain    -  Primary ICD-10-CM: M54.2 ICD-9-CM: 723.1 Cervical radiculopathy at C6     ICD-10-CM: M54.12 
ICD-9-CM: 723.4 Bilateral thumb pain     ICD-10-CM: M79.644, M79.645 ICD-9-CM: 729.5 Pain in finger of both hands     ICD-10-CM: M79.645, M79.644 ICD-9-CM: 729.5 Carpal tunnel syndrome, right     ICD-10-CM: G56.01 
ICD-9-CM: 354.0 Right foot pain     ICD-10-CM: M79.671 ICD-9-CM: 729.5 Chronic right-sided low back pain without sciatica     ICD-10-CM: M54.5, G89.29 ICD-9-CM: 724.2, 338.29 Vitals BP Pulse Temp Resp Height(growth percentile) Weight(growth percentile) 116/80 (BP 1 Location: Right arm, BP Patient Position: Sitting) (!) 115 98.7 °F (37.1 °C) (Oral) 14 5' 8\" (1.727 m) 282 lb (127.9 kg) SpO2 BMI Smoking Status 97% 42.88 kg/m2 Former Smoker Vitals History BMI and BSA Data Body Mass Index Body Surface Area  
 42.88 kg/m 2 2.48 m 2 Preferred Pharmacy Pharmacy Name Phone [facility] [address] [facility], [address] [phone] Your Updated Medication List  
  
   
This list is accurate as of: 1/2/18 11:59 PM.  Always use your most recent med list.  
  
  
  
  
 dextroamphetamine-amphetamine 30 mg tablet Commonly known as:  ADDERALL Take 1 Tab by mouth two (2) times a dayEarliest Fill Date: 12/4/17. escitalopram oxalate 20 mg tablet Commonly known as:  LEXAPRO  
take 1 tablet by mouth once daily  
  
 losartan 25 mg tablet Commonly known as:  COZAAR Take 1 Tab by mouth daily. methylPREDNISolone 4 mg tablet Commonly known as:  Mardell Layman Take as directed QSYMIA 15-92 mg Cm24 Generic drug:  phentermine-topiramate Take  by mouth daily. valACYclovir 500 mg tablet Commonly known as:  VALTREX Take 1 Tab by mouth two (2) times a day. WELLBUTRIN 75 mg tablet Generic drug:  buPROPion Take  by mouth daily. Prescriptions Sent to Pharmacy Refills  
 methylPREDNISolone (MEDROL DOSEPACK) 4 mg tablet 0 Sig: Take as directed Class: Normal  
 Pharmacy: [id number] [address] [place] [address] #: [phone] We Performed the Following REFERRAL TO PODIATRY [REF90 Custom] To-Do List   
 01/02/2018 Neurology:  EMG TWO EXTREMITIES UPPER   
  
 01/02/2018 Imaging:  XR HAND LT AP/LAT   
  
 01/02/2018 Imaging:  XR HAND RT AP/LAT   
  
 01/02/2018 Imaging:  XR SPINE CERV 4 OR 5 V   
  
 01/02/2018 Imaging:  XR SPINE LUMB 2 OR 3 V Referral Information Referral ID Referred By Referred To  
  
 [id number] Lisa VARGHESE [facility] [address] [place] [address], [address] [place] [address]  Phone: [phone] Fax: [phone] Visits Status Start Date End Date 1 Authorized 1/2/18 1/2/19 If your referral has a status of pending review or denied, additional information will be sent to support the outcome of this decision. Referral ID Referred By Referred To  
 [id number] Lisa VARGHESE Not Available Visits Status Start Date End Date 1 New Request 1/2/18 1/2/19 If your referral has a status of pending review or denied, additional information will be sent to support the outcome of this decision. Please provide this summary of care documentation to your next provider. Your primary care clinician is listed as Danielle Martin. If you have any questions after today's visit, please call [phone].

## 2018-01-02 NOTE — PROGRESS NOTES
1. Have you been to the ER, urgent care clinic or hospitalized since your last visit? NO.     2. Have you seen or consulted any other health care providers outside of the [address] [place] [address] since your last visit (Include any pap smears or colon screening)? NO      Do you have an Advanced Directive? NO    Would you like information on Advanced Directives?  NO

## 2018-01-03 NOTE — PROGRESS NOTES
42 y.o. WHITE OR  male who presents for evaluation. He's been having bilat hand pain for some months now. Affects mainly the thumb and forefingers. No actual joint swelling or synovitis. It's there for hours, no injuries he can point to. He also c/o bilat paresthesias in the 2nd-4th fingers periodically. Lately, he's been noticing right 4th/5th finger paresthesias also. No actual midline neck pain, more right sided pain with radiation down into the hand. No weakness. No known c spine disease. Finally, he also c/o lbp although no radicular complaints.   Not able to use nsaid due to post bypass and also prior h/o pud    Past Medical History:   Diagnosis Date    ADD  10/11    Allergic rhinitis     Asthma     Chronic back pain     Colon polyp     f/u colo age 46 per Dr Nidhi Hernandez from 2014    Depression     FHx: heart disease     GERD (gastroesophageal reflux disease)     Hepatic steatosis     HTN (hypertension) 2010    resolved after gastric bypass    Obesity     Gastric bypass 5/10 Dr. Clare Wetzel BMI 54, peak weight 367; elvis 195 lbs    FRANCISCA on CPAP     resolved after wt loss    Primary hypertension 3/28/2017    Stomach ulcer 2002    Dr. Nidhi Hernandez    [facility])      Past Surgical History:   Procedure Laterality Date    BIOPSY LIVER      Dr Lucila Marcano  03/2017    echo nl lv, ef 60%, mild aortic root dilation    HX BUNIONECTOMY      Dr Harriet Cerrato; saw [id number] then Dr Khadar Ba later    HX CHOLECYSTECTOMY  9/08    Dr Xenia Valencia      2002 negative, last 2005 proctitis Dr Calista Glaser  3/10    Lap Cherri-en-Y BMI 47    HX ORTHOPAEDIC      left hip Dr Ashley Garcia age 13     Social History     Social History    Marital status:      Spouse name: N/A    Number of children: 2    Years of education: N/A     Occupational History    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 Current Outpatient Prescriptions   Medication Sig    buPROPion (WELLBUTRIN) 75 mg tablet Take  by mouth daily.  methylPREDNISolone (MEDROL DOSEPACK) 4 mg tablet Take as directed    dextroamphetamine-amphetamine (ADDERALL) 30 mg tablet Take 1 Tab by mouth two (2) times a dayEarliest Fill Date: 12/4/17.  escitalopram oxalate (LEXAPRO) 20 mg tablet take 1 tablet by mouth once daily    valACYclovir (VALTREX) 500 mg tablet Take 1 Tab by mouth two (2) times a day.  losartan (COZAAR) 25 mg tablet Take 1 Tab by mouth daily.  phentermine-topiramate (QSYMIA) 15-92 mg CM24 Take  by mouth daily. No current facility-administered medications for this visit. Allergies   Allergen Reactions    Lisinopril Cough     REVIEW OF SYSTEMS:   Ophtho - no vision change or eye pain  Oral - no mouth pain, tongue or tooth problems  Ears - no hearing loss, ear pain, fullness, no swallowing problems  Cardiac - no CP, PND, orthopnea, edema, palpitations or syncope  Chest - no breast masses  Resp - no wheezing, chronic coughing, dyspnea  GI - no heartburn, nausea, vomiting, change in bowel habits, bleeding, hemorrhoids  Urinary - no dysuria, hematuria, flank pain, urgency, frequency  Genitals - no genital lesions, discharge, masses, ulceration, warts    Visit Vitals    /80 (BP 1 Location: Right arm, BP Patient Position: Sitting)    Pulse (!) 115    Temp 98.7 °F (37.1 °C) (Oral)    Resp 14    Ht 5' 8\" (1.727 m)    Wt 282 lb (127.9 kg)    SpO2 97%    BMI 42.88 kg/m2   some tenderness on palpation of the 1st pip and mcp along with the area in between bilat; no clear synovitis. Neck supple with from. Strength, sensation, reflexes intact BUE.   Positive tinel's on left, neg on right. Assessment and plan:  1. Hand pain. Check films. Depending on results, may end up sending to hand spec  2. Possible CTS vs radiculopathy. Check emg. Empiric medrol dosepack given after discussing possible sfx  3. In passing, he wanted referral to podiatry so sent to Dr Chris Acevedo  4. Spine. If radiculopathy confirmed, will check mri and send to Dr Julio Javier per his request      Above conditions discussed at length and patient vocalized understanding.   All questions answered to patient satisfaction

## 2018-01-08 ENCOUNTER — TELEPHONE (OUTPATIENT)
Dept: INTERNAL MEDICINE CLINIC | Age: 38
End: 2018-01-08

## 2018-01-09 NOTE — TELEPHONE ENCOUNTER
pls call    c spine shows degen disc foraminal narrowing C4-5  l spine shows degen arthritis  Hand films show early arthritis except left 5th which showed more severe arthritis (prior trauma?)

## 2018-01-11 NOTE — TELEPHONE ENCOUNTER
Spoke with pt in detail about message below.  He will speak to his wife about seeing a spine specialist about his back pain  Also, order faxed to neurology to schedule emg

## 2018-01-17 ENCOUNTER — TELEPHONE (OUTPATIENT)
Dept: INTERNAL MEDICINE CLINIC | Age: 38
End: 2018-01-17

## 2018-01-17 DIAGNOSIS — M54.2 CHRONIC NECK PAIN: Primary | ICD-10-CM

## 2018-01-17 DIAGNOSIS — G89.29 CHRONIC NECK PAIN: Primary | ICD-10-CM

## 2018-01-17 NOTE — TELEPHONE ENCOUNTER
Patient stating he saw RD recently for hand, neck and back pain. Says he is not able to sleep at night because of it and wants to know if something can be called in to [place].

## 2018-01-18 ENCOUNTER — HOSPITAL ENCOUNTER (OUTPATIENT)
Dept: LAB | Age: 38
Discharge: HOME | End: 2018-01-18

## 2018-01-18 LAB — SENTARA SPECIMEN COL,SENBCF: NORMAL

## 2018-01-18 PROCEDURE — 99001 SPECIMEN HANDLING PT-LAB: CPT | Performed by: PODIATRIST

## 2018-01-18 RX ORDER — GABAPENTIN 300 MG/1
300 CAPSULE ORAL 3 TIMES DAILY
Qty: 60 CAP | Refills: 5 | Status: SHIPPED | OUTPATIENT
Start: 2018-01-18 | End: 2018-05-03

## 2018-01-18 NOTE — TELEPHONE ENCOUNTER
We can try gabapentin  Will be written for bid  Week 1 start qhs only  Week 2 start bid  sched ov 2 weeks to f/u pls

## 2018-01-18 NOTE — TELEPHONE ENCOUNTER
Returned call to patient about the below message. Patient verbalized understanding and scheduled an appointment for a follow up with Dr. Mere Granados on February 8th at 9:40am.  Advised patient to show up 15 minutes early for his appointment. Patient understood.

## 2018-01-29 DIAGNOSIS — F98.8 ATTENTION DEFICIT DISORDER, UNSPECIFIED HYPERACTIVITY PRESENCE: Primary | ICD-10-CM

## 2018-01-29 RX ORDER — DEXTROAMPHETAMINE SACCHARATE, AMPHETAMINE ASPARTATE, DEXTROAMPHETAMINE SULFATE AND AMPHETAMINE SULFATE 7.5; 7.5; 7.5; 7.5 MG/1; MG/1; MG/1; MG/1
30 TABLET ORAL 2 TIMES DAILY
Qty: 60 TAB | Refills: 0 | Status: SHIPPED | OUTPATIENT
Start: 2018-01-29 | End: 2018-02-26 | Stop reason: SDUPTHER

## 2018-02-26 DIAGNOSIS — F98.8 ATTENTION DEFICIT DISORDER, UNSPECIFIED HYPERACTIVITY PRESENCE: ICD-10-CM

## 2018-02-26 RX ORDER — DEXTROAMPHETAMINE SACCHARATE, AMPHETAMINE ASPARTATE, DEXTROAMPHETAMINE SULFATE AND AMPHETAMINE SULFATE 7.5; 7.5; 7.5; 7.5 MG/1; MG/1; MG/1; MG/1
30 TABLET ORAL 2 TIMES DAILY
Qty: 60 TAB | Refills: 0 | Status: SHIPPED | OUTPATIENT
Start: 2018-02-26 | End: 2018-03-19 | Stop reason: SDUPTHER

## 2018-02-26 NOTE — TELEPHONE ENCOUNTER
[facility] date for [facility]der[facility] as 01/30/2018 for a 30 d/s. There appears to be no inconsistencies in regards to the prescribing of this medication. Last Visit: 01/02/2018 with MD Mere Granados    Next Appointment: none   Previous Refill Encounters: 01/29/2018 per MD Mere Granados #60    Requested Prescriptions     Pending Prescriptions Disp Refills    dextroamphetamine-amphetamine (ADDERALL) 30 mg tablet 60 Tab 0     Sig: Take 1 Tab by mouth two (2) times a day.

## 2018-03-01 ENCOUNTER — OFFICE VISIT (OUTPATIENT)
Dept: NEUROLOGY | Age: 38
End: 2018-03-01

## 2018-03-01 DIAGNOSIS — G56.03 BILATERAL CARPAL TUNNEL SYNDROME: Primary | ICD-10-CM

## 2018-03-01 NOTE — PROGRESS NOTES
[address] [phone]    Neurophysiology Report    Patient: Damian Pfeiffer     ID: [id number] Physician: Burgess Webster. Robert Burton MD   Gender: Male Ref Phys: Richard Godwin MD   Handedness:      Study Date: March 1, 2018         Patient History: This 64-year-old man has noted several months worth of numbness and pain in the left worse than right hand. He is also noted some chronic shoulder and neck pain mostly on the left side. On brief exam he has intact strength and sensation. Tinel sign is positive on the left side.       Nerve Conduction Studies  Anti Sensory Summary Table     Stim Site NR Peak (ms) Norm Peak (ms) O-P Amp (µV) Norm O-P Amp Dist (cm) Maximilian (m/s)   Left Median 2nd Digit Anti Sensory (2nd Digit)   Wrist    4.7 <3.5 24.6 >20 13.0 36.1       4.6  26.7      Right Median 2nd Digit Anti Sensory (2nd Digit)   Wrist    3.7 <3.5 40.0 >20 13.0 50.0       3.5  35.8      Left Ulnar Anti Sensory (5th Digit )   Wrist    2.8 <3.1 32.3 >17.0 11.0 52.4   Site 2    2.8  32.9      Right Ulnar Anti Sensory (5th Digit )   Wrist    2.9 <3.1 38.1 >17.0 11.0 55.0   Site 2    2.8  35.0        Motor Summary Table     Stim Site NR Onset (ms) Norm Onset (ms) O-P Amp (mV) Norm O-P Amp Dist (cm) Maximilian (m/s) Norm Maximilian (m/s)   Left Median Motor (Abd Poll Brev)   Wrist    4.5 <4.4 8.6 >4.0 26.0 57.8 >49   Elbow    9.0  8.3       Right Median Motor (Abd Poll Brev)   Wrist    4.0 <4.4 9.4 >4.0 27.0 57.4 >49   Elbow    8.7  6.0       Left Ulnar Motor (Abd Dig Minimi )   Wrist    2.4 <3.3 8.5 >6.0 17.0 60.7 >49   B Elbow    5.2  7.6  16.0 59.3 >50   A Elbow    7.9  7.3       Right Ulnar Motor (Abd Dig Minimi )   Wrist    2.5 <3.3 9.6 >6.0 18.0 69.2 >49   B Elbow    5.1  8.9  16.0 57.1 >50   A Elbow    7.9  8.8           EMG     Side Muscle Nerve Root Ins Act Fibs Psw Fasc Amp Dur Poly Recrt Int Bandarsimon Mcgrath Comment   Left Deltoid Axillary C5-6 Nml Nml Nml None Nml Nml 0 Nml Nml    Left Biceps Musculocut C5-6 Nml Nml Nml None Nml Nml 0 Nml Nml    Left Triceps Radial C6-7-8 Nml Nml Nml None Nml Nml 0 Nml Nml    Left FlexCarRad Median C6-7 Nml Nml Nml None Nml Nml 0 Nml Nml    Left 1stDorInt Ulnar C8-T1 Nml Nml Nml None Nml Nml 0 Nml Nml    Left Abd Poll Brev Median C8-T1 Nml Nml Nml None Nml Nml 0 Nml Nml      NCS/EMG FINDINGS:     Evaluation of the Left median motor nerve showed prolonged distal onset latency (4.5 ms).  The Right median motor, the Left ulnar motor, the Right ulnar motor, the Left ulnar sensory, and the Right ulnar sensory nerves were unremarkable.  The Left Median 2nd Digit sensory nerve showed prolonged distal peak latency (4.7 ms) and decreased conduction velocity (Wrist-2nd Digit, 36.1 m/s).  The Right Median 2nd Digit sensory nerve showed prolonged distal peak latency (3.7 ms). INTERPRETATION: This was an abnormal nerve conduction and EMG study showing there to be prolongation of the distal latency in the left worse than the right median nerve. This is consistent with left worse than right sided carpal tunnel syndrome. ___________________________  Jason Lam MD          Waveforms:                      [id number] AT [facility]  OFFICE PROCEDURE PROGRESS NOTE        Chart reviewed for the following:   Ju Bullock MD, have reviewed the History, Physical and updated the Allergic reactions for Martell Valencia     TIME OUT performed immediately prior to start of procedure:   Ju Bullock MD, have performed the following reviews on Martell Valencia prior to the start of the procedure:            * Patient was identified by name and date of birth   * Agreement on procedure being performed was verified  * Risks and Benefits explained to the patient  * Procedure site verified and marked as necessary  * Patient was positioned for comfort  * Consent was signed and verified     Time: 3:32 PM    Date of procedure: 3/1/2018    Procedure performed by:  Dragan Rodriguez MD    Patient assisted by: self    How tolerated by patient: tolerated the procedure well with no complications    Post Procedural Pain Scale: 0 - No Hurt    Comments: none

## 2018-03-01 NOTE — COMMUNICATION BODY
[address] [phone]    Neurophysiology Report    Patient: Nelly Duran     ID: [id number] Physician: Jill Kennedy. Chandu Lacey MD   Gender: Male Ref Phys: Haydee Granados MD   Handedness:      Study Date: March 1, 2018         Patient History: This 27-year-old man has noted several months worth of numbness and pain in the left worse than right hand. He is also noted some chronic shoulder and neck pain mostly on the left side. On brief exam he has intact strength and sensation. Tinel sign is positive on the left side.       Nerve Conduction Studies  Anti Sensory Summary Table     Stim Site NR Peak (ms) Norm Peak (ms) O-P Amp (µV) Norm O-P Amp Dist (cm) Maximilian (m/s)   Left Median 2nd Digit Anti Sensory (2nd Digit)   Wrist    4.7 <3.5 24.6 >20 13.0 36.1       4.6  26.7      Right Median 2nd Digit Anti Sensory (2nd Digit)   Wrist    3.7 <3.5 40.0 >20 13.0 50.0       3.5  35.8      Left Ulnar Anti Sensory (5th Digit )   Wrist    2.8 <3.1 32.3 >17.0 11.0 52.4   Site 2    2.8  32.9      Right Ulnar Anti Sensory (5th Digit )   Wrist    2.9 <3.1 38.1 >17.0 11.0 55.0   Site 2    2.8  35.0        Motor Summary Table     Stim Site NR Onset (ms) Norm Onset (ms) O-P Amp (mV) Norm O-P Amp Dist (cm) Maximilian (m/s) Norm Maximilian (m/s)   Left Median Motor (Abd Poll Brev)   Wrist    4.5 <4.4 8.6 >4.0 26.0 57.8 >49   Elbow    9.0  8.3       Right Median Motor (Abd Poll Brev)   Wrist    4.0 <4.4 9.4 >4.0 27.0 57.4 >49   Elbow    8.7  6.0       Left Ulnar Motor (Abd Dig Minimi )   Wrist    2.4 <3.3 8.5 >6.0 17.0 60.7 >49   B Elbow    5.2  7.6  16.0 59.3 >50   A Elbow    7.9  7.3       Right Ulnar Motor (Abd Dig Minimi )   Wrist    2.5 <3.3 9.6 >6.0 18.0 69.2 >49   B Elbow    5.1  8.9  16.0 57.1 >50   A Elbow    7.9  8.8           EMG     Side Muscle Nerve Root Ins Act Fibs Psw Fasc Amp Dur Poly Recrt Int Tivis Tito Comment   Left Deltoid Axillary C5-6 Nml Nml Nml None Nml Nml 0 Nml Nml    Left Biceps Musculocut C5-6 Nml Nml Nml None Nml Nml 0 Nml Nml    Left Triceps Radial C6-7-8 Nml Nml Nml None Nml Nml 0 Nml Nml    Left FlexCarRad Median C6-7 Nml Nml Nml None Nml Nml 0 Nml Nml    Left 1stDorInt Ulnar C8-T1 Nml Nml Nml None Nml Nml 0 Nml Nml    Left Abd Poll Brev Median C8-T1 Nml Nml Nml None Nml Nml 0 Nml Nml      NCS/EMG FINDINGS:     Evaluation of the Left median motor nerve showed prolonged distal onset latency (4.5 ms).  The Right median motor, the Left ulnar motor, the Right ulnar motor, the Left ulnar sensory, and the Right ulnar sensory nerves were unremarkable.  The Left Median 2nd Digit sensory nerve showed prolonged distal peak latency (4.7 ms) and decreased conduction velocity (Wrist-2nd Digit, 36.1 m/s).  The Right Median 2nd Digit sensory nerve showed prolonged distal peak latency (3.7 ms). INTERPRETATION: This was an abnormal nerve conduction and EMG study showing there to be prolongation of the distal latency in the left worse than the right median nerve. This is consistent with left worse than right sided carpal tunnel syndrome. ___________________________  Clark Scott MD          Waveforms:

## 2018-03-01 NOTE — LETTER
3/1/2018 3:32 PM 
 
Patient:  Angelo Savage YOB: 1980 Date of Visit: 3/1/2018 Dear MD Mavis Ochoa [facility] [address] [id number] [address] [place] [phone] VIA In Basket 
 : Thank you for referring Mr. Nando Clarke to me for evaluation/treatment. Below are the relevant portions of my assessment and plan of care. [facility] [phone] [id number] [address] [address] 
[facility] [phone] Neurophysiology Report Patient: Nando Clarke ID: [id number] Physician: Benita Patel. Agusto De Souza MD  
Gender: Male Ref Phys: Yuniel Carmona MD  
Handedness:     
Study Date: March 1, 2018 Patient History: This 25-year-old man has noted several months worth of numbness and pain in the left worse than right hand. He is also noted some chronic shoulder and neck pain mostly on the left side. On brief exam he has intact strength and sensation. Tinel sign is positive on the left side. Nerve Conduction Studies Anti Sensory Summary Table Stim Site NR Peak (ms) Norm Peak (ms) O-P Amp (µV) Norm O-P Amp Dist (cm) Maximilian (m/s) Left Median 2nd Digit Anti Sensory (2nd Digit) Wrist    4.7 <3.5 24.6 >20 13.0 36.1  
    4.6  26.7 Right Median 2nd Digit Anti Sensory (2nd Digit) Wrist    3.7 <3.5 40.0 >20 13.0 50.0  
    3.5  35.8 Left Ulnar Anti Sensory (5th Digit ) Wrist    2.8 <3.1 32.3 >17.0 11.0 52.4 Site 2    2.8  32.9 Right Ulnar Anti Sensory (5th Digit ) Wrist    2.9 <3.1 38.1 >17.0 11.0 55.0 Site 2    2.8  35.0 Motor Summary Table Stim Site NR Onset (ms) Norm Onset (ms) O-P Amp (mV) Norm O-P Amp Dist (cm) Maximilian (m/s) Norm Maximilian (m/s) Left Median Motor (Abd Poll Brev) Wrist    4.5 <4.4 8.6 >4.0 26.0 57.8 >49 Elbow    9.0  8.3 Right Median Motor (Abd Poll Brev) Wrist    4.0 <4.4 9.4 >4.0 27.0 57.4 >49 Elbow    8.7  6.0 Left Ulnar Motor (Abd Dig Minimi ) Wrist    2.4 <3.3 8.5 >6.0 17.0 60.7 >49 B Elbow    5.2  7.6  16.0 59.3 >50 A Elbow    7.9  7.3 Right Ulnar Motor (Abd Dig Minimi ) Wrist    2.5 <3.3 9.6 >6.0 18.0 69.2 >49 B Elbow    5.1  8.9  16.0 57.1 >50 A Elbow    7.9  8.8 EMG Side Muscle Nerve Root Ins Act Fibs Psw Fasc Amp Dur Poly Recrt Int Willene Mulch Comment Left Deltoid Axillary C5-6 Nml Nml Nml None Nml Nml 0 Nml Nml Left Biceps Musculocut C5-6 Nml Nml Nml None Nml Nml 0 Nml Nml Left Triceps Radial C6-7-8 Nml Nml Nml None Nml Nml 0 Nml Nml Left FlexCarRad Median C6-7 Nml Nml Nml None Nml Nml 0 Nml Nml Left 1stDorInt Ulnar C8-T1 Nml Nml Nml None Nml Nml 0 Nml Nml Left Abd Poll Brev Median C8-T1 Nml Nml Nml None Nml Nml 0 Nml Nml NCS/EMG FINDINGS: 
 
? Evaluation of the Left median motor nerve showed prolonged distal onset latency (4.5 ms). ? The Right median motor, the Left ulnar motor, the Right ulnar motor, the Left ulnar sensory, and the Right ulnar sensory nerves were unremarkable. ? The Left Median 2nd Digit sensory nerve showed prolonged distal peak latency (4.7 ms) and decreased conduction velocity (Wrist-2nd Digit, 36.1 m/s). ? The Right Median 2nd Digit sensory nerve showed prolonged distal peak latency (3.7 ms). INTERPRETATION: This was an abnormal nerve conduction and EMG study showing there to be prolongation of the distal latency in the left worse than the right median nerve. This is consistent with left worse than right sided carpal tunnel syndrome. ___________________________ Jacquelin Guallpa MD 
 
 
 
 
Waveforms: If you have questions, please do not hesitate to call me. I look forward to following Mr. Alma Delia Mejia along with you.  
 
 
 
Sincerely, 
 
 
Vladimir Sandra MD

## 2018-03-19 DIAGNOSIS — F98.8 ATTENTION DEFICIT DISORDER, UNSPECIFIED HYPERACTIVITY PRESENCE: ICD-10-CM

## 2018-03-19 NOTE — TELEPHONE ENCOUNTER
[facility] date for Adderall as 02/27/2018 for a 30 d/s. There appears to be no inconsistencies in regards to the prescribing of this medication. Last Visit: 01/02/2018 with MD Naveen Encarnacion    Next Appointment: 03/26/2018 with MD Naveen Encarnacion   Previous Refill Encounters: 02/26/2018 per MD Naveen Encarnacion #[id number]    Requested Prescriptions     Pending Prescriptions Disp Refills    dextroamphetamine-amphetamine (ADDERALL) 30 mg tablet 60 Tab 0     Sig: Take 1 Tab by mouth two (2) times a dayEarliest Fill Date: 3/19/18.

## 2018-03-20 RX ORDER — DEXTROAMPHETAMINE SACCHARATE, AMPHETAMINE ASPARTATE, DEXTROAMPHETAMINE SULFATE AND AMPHETAMINE SULFATE 7.5; 7.5; 7.5; 7.5 MG/1; MG/1; MG/1; MG/1
30 TABLET ORAL 2 TIMES DAILY
Qty: 60 TAB | Refills: 0 | Status: SHIPPED | OUTPATIENT
Start: 2018-03-20 | End: 2018-04-20 | Stop reason: SDUPTHER

## 2018-03-26 DIAGNOSIS — Z98.84 S/P GASTRIC BYPASS: ICD-10-CM

## 2018-03-26 DIAGNOSIS — E78.5 DYSLIPIDEMIA: ICD-10-CM

## 2018-03-26 DIAGNOSIS — I10 PRIMARY HYPERTENSION: ICD-10-CM

## 2018-03-28 DIAGNOSIS — Z98.84 S/P GASTRIC BYPASS: ICD-10-CM

## 2018-04-20 DIAGNOSIS — F98.8 ATTENTION DEFICIT DISORDER, UNSPECIFIED HYPERACTIVITY PRESENCE: ICD-10-CM

## 2018-04-20 RX ORDER — DEXTROAMPHETAMINE SACCHARATE, AMPHETAMINE ASPARTATE, DEXTROAMPHETAMINE SULFATE AND AMPHETAMINE SULFATE 7.5; 7.5; 7.5; 7.5 MG/1; MG/1; MG/1; MG/1
30 TABLET ORAL 2 TIMES DAILY
Qty: 60 TAB | Refills: 0 | Status: SHIPPED | OUTPATIENT
Start: 2018-04-20 | End: 2018-05-17 | Stop reason: SDUPTHER

## 2018-05-03 ENCOUNTER — OFFICE VISIT (OUTPATIENT)
Dept: INTERNAL MEDICINE CLINIC | Age: 38
End: 2018-05-03

## 2018-05-03 ENCOUNTER — TELEPHONE (OUTPATIENT)
Dept: INTERNAL MEDICINE CLINIC | Age: 38
End: 2018-05-03

## 2018-05-03 VITALS
OXYGEN SATURATION: 98 % | HEIGHT: 68 IN | DIASTOLIC BLOOD PRESSURE: 92 MMHG | SYSTOLIC BLOOD PRESSURE: 130 MMHG | WEIGHT: 293.6 LBS | HEART RATE: 119 BPM | BODY MASS INDEX: 44.5 KG/M2 | RESPIRATION RATE: 14 BRPM | TEMPERATURE: 97.9 F

## 2018-05-03 DIAGNOSIS — M21.619 BUNION: ICD-10-CM

## 2018-05-03 DIAGNOSIS — I10 PRIMARY HYPERTENSION: ICD-10-CM

## 2018-05-03 DIAGNOSIS — M25.561 RIGHT KNEE PAIN, UNSPECIFIED CHRONICITY: Primary | ICD-10-CM

## 2018-05-03 DIAGNOSIS — E66.01 MORBID OBESITY WITH BMI OF 40.0-44.9, ADULT (HCC): ICD-10-CM

## 2018-05-03 DIAGNOSIS — M21.40 PES PLANUS, UNSPECIFIED LATERALITY: ICD-10-CM

## 2018-05-03 NOTE — MR AVS SNAPSHOT
[address] [address] [address] 
[phone] Patient: Bre Butler MRN: [id number] [facility]:[birth year] Visit Information Date & Time Provider Department Dept. Phone Encounter #  
 5/3/2018  4:30 PM [facility] [address] [place] [address] [phone] Upcoming Health Maintenance Date Due DTaP/Tdap/Td series (1 - Tdap) 5/2/1999 Influenza Age 5 to Adult 8/1/2018 COLONOSCOPY 3/28/2027 Allergies as of 5/3/2018  Review Complete On: 5/3/2018 By: Rosmery Cason LPN Severity Noted Reaction Type Reactions Lisinopril  06/19/2017    Cough Current Immunizations  Reviewed on 5/21/2015 Name Date Influenza Vaccine 11/23/2013 Influenza Vaccine Split 10/5/2011 Influenza Vaccine Whole 9/12/2012, 11/4/2009 Not reviewed this visit Vitals BP Pulse Temp Resp Height(growth percentile) Weight(growth percentile) (!) 130/92 (BP 1 Location: Right arm, BP Patient Position: Sitting) (!) 119 97.9 °F (36.6 °C) (Oral) 14 5' 8\" (1.727 m) 293 lb 9.6 oz (133.2 kg) SpO2 BMI Smoking Status 98% 44.64 kg/m2 Former Smoker Vitals History BMI and BSA Data Body Mass Index Body Surface Area  
 44.64 kg/m 2 2.53 m 2 Preferred Pharmacy Pharmacy Name Phone [address], Rosario Sandoval [address] [place] [address] [facility] [phone] Your Updated Medication List  
  
   
This list is accurate as of 5/3/18  5:06 PM.  Always use your most recent med list.  
  
  
  
  
 dextroamphetamine-amphetamine 30 mg tablet Commonly known as:  ADDERALL Take 1 Tab by mouth two (2) times a dayEarliest Fill Date: 4/20/18. escitalopram oxalate 20 mg tablet Commonly known as:  LEXAPRO  
take 1 tablet by mouth once daily  
  
 gabapentin 300 mg capsule Commonly known as:  NEURONTIN Take 1 Cap by mouth three (3) times daily. losartan 25 mg tablet Commonly known as:  COZAAR Take 1 Tab by mouth daily. QSYMIA 15-92 mg Cm24 Generic drug:  phentermine-topiramate Take  by mouth daily. valACYclovir 500 mg tablet Commonly known as:  VALTREX Take 1 Tab by mouth two (2) times a day. WELLBUTRIN 75 mg tablet Generic drug:  buPROPion Take  by mouth daily. Introducing [facility]! Dear Rosa Ozuna: 
Thank you for requesting a [employer] account. Our records indicate that you already have an active [employer] account. You can access your account anytime at [email or web address]. [employer] Did you know that you can access your hospital and ER discharge instructions at any time in [employer]? You can also review all of your test results from your hospital stay or ER visit. Additional Information If you have questions, please visit the Frequently Asked Questions section of the [employer] website at [email or web address]. [employer]/. Remember, [employer] is NOT to be used for urgent needs. For medical emergencies, dial 911. Now available from your iPhone and Android! Please provide this summary of care documentation to your next provider. Your primary care clinician is listed as [facility] Service. If you have any questions after today's visit, please call [phone].

## 2018-05-03 NOTE — TELEPHONE ENCOUNTER
pls call    INTERPRETATION: This was an abnormal nerve conduction and EMG study showing there to be prolongation of the distal latency in the left worse than the right median nerve. This is consistent with left worse than right sided carpal tunnel syndrome. Is he better on splints?   If not, suggest ortho eval - dr Merrill Garcia group

## 2018-05-03 NOTE — TELEPHONE ENCOUNTER
Pt asking rd to call him regarding the nerve tests that Dr. Heron Roblero did. Says he had referral from RD to neurology for the test but was told the results would go to RD and RD would give him the results. Please advise.

## 2018-05-03 NOTE — PROGRESS NOTES
1. Have you been to the ER, urgent care clinic or hospitalized since your last visit? NO.     2. Have you seen or consulted any other health care providers outside of the [facility] since your last visit (Include any pap smears or colon screening)? NO      Do you have an Advanced Directive? NO    Would you like information on Advanced Directives?  NO

## 2018-05-03 NOTE — PROGRESS NOTES
HPI/History  Trinity Edge is a 34 y.o.  male who presents for evaluation. Pt reports intermittent, usually low level, right knee discomfort for a year or more which he mostly attributes to his right bunion, flat feet, and intermittent arch pain. Excess weight also likely to play a role. However, pt reports increased pain in right knee for about 2 wks. Frequently sharp in quality and worse with weightbearing, walking, and ascending stairs. Mostly affects right anteromedial knee, lateral knee, and less so posteriorly. He has not noted any redness/erythema, bruising, or other discoloration. No warmth, fevers, or other signs of septic nature. He reports minimal swelling mostly superior and slightly lateral to patella around distal quadriceps. He admits to popping/clicking occasionally as his baseline but no locking or giving way. No known trauma/event but walks a lot at construction sites. He is taking 800mg ibuprofen tid and using a knee brace. He has an appt for the issue next week with ortho, Dr. Nhan Alcaraz. He was hoping for a steroid injection until he sees ortho. No other sxs or complaints.     Patient Active Problem List   Diagnosis Code    Colon polyp 8/05 K63.5    Anxiety F41.9    Dyslipidemia E78.5    Primary hypertension I10    S/P gastric bypass 3/10 Dr Lan Oppenheim Z98.84    Morbid obesity with BMI of 40.0-44.9, adult ([facility] E66.01, Z68.41    Moderate episode of recurrent major depressive disorder ([facility] F33.1    Attention deficit hyperactivity disorder (ADHD), predominantly inattentive type F90.0     Past Medical History:   Diagnosis Date    ADD  10/11    Allergic rhinitis     Asthma     Chronic back pain     Colon polyp     f/u colo age 46 per Dr Jourdan Rankin from 2014    Depression     FHx: heart disease     GERD (gastroesophageal reflux disease)     Hepatic steatosis     HTN (hypertension) 2010    resolved after gastric bypass    Obesity     Gastric bypass 5/10 Dr. Kayy Hill BMI 54, peak weight 367; elvis 195 lbs    FRANCISCA on CPAP     resolved after wt loss    Primary hypertension 3/28/2017    Stomach ulcer 2002    Dr. Juhi Canchola    Ulcerative proctitis [facility])      Past Surgical History:   Procedure Laterality Date    BIOPSY LIVER      Dr Marquis Turpin  03/2017    echo nl lv, ef 60%, mild aortic root dilation    HX BUNIONECTOMY      Dr Loida Benz; saw [id number] then Dr Thiago Fry later    HX CHOLECYSTECTOMY  9/08    Dr Lionel Cutler COLONOSCOPY      2002 negative, last 2005 proctitis Dr Novak Sons  3/10    Lap Cherri-en-Y BMI 47    HX ORTHOPAEDIC      left hip Dr Holley Begum age 13     Social History     Social History    Marital status:      Spouse name: N/A    Number of children: 2    Years of education: N/A     Occupational History    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 Family History   Problem Relation Age of Onset    Cancer Maternal Grandmother      colon    Stroke Maternal Grandfather     Diabetes Paternal Grandfather     Heart Disease Paternal Grandfather     Cancer Paternal Grandfather      colon    Arthritis-osteo Mother     Heart Disease Mother     Depression Mother     Hypertension Father     Depression Father     Elevated Lipids Father      Current Outpatient Prescriptions   Medication Sig    dextroamphetamine-amphetamine (ADDERALL) 30 mg tablet Take 1 Tab by mouth two (2) times a dayEarliest Fill Date: 4/20/18.  valACYclovir (VALTREX) 500 mg tablet Take 1 Tab by mouth two (2) times a day.  (Patient taking differently: Take 500 mg by mouth two (2) times daily as needed.)    losartan (COZAAR) 25 mg tablet Take 1 Tab by mouth daily. No current facility-administered medications for this visit. Allergies   Allergen Reactions    Lisinopril Cough       Review of Systems  Aside from those included in HPI, remainder of ROS negative. Physical Examination  Visit Vitals    BP (!) 130/92 (BP 1 Location: Right arm, BP Patient Position: Sitting)    Pulse (!) 119    Temp 97.9 °F (36.6 °C) (Oral)    Resp 14    Ht 5' 8\" (1.727 m)    Wt 293 lb 9.6 oz (133.2 kg)    SpO2 98%    BMI 44.64 kg/m2       General - Alert and in no acute distress. Pt appears well, comfortable, and in good spirits. Pleasant, engaging. Nontoxic. Not anxious, non-diaphoretic. Mental status - Appropriate mood, behavior, speech content, dress, and thought processes. Pulm - No tachypnea, retractions, or cyanosis. Good respiratory effort. Cardiovascular - Tachycardic initially, likely due to pain. Right knee fairly unremarkable visually. No overt swelling or effusion. No discoloration. No warmth. No gross tenderness with palpation, including jointline. No signs of thrombosis. Good ROM. Discomfort noted with weightbearing and pt with antalgic gait. Pes planus and rather severe bunion noted. Neurovascularly intact. No other findings. Assessment and Plan  1. Right knee pain - low level chronically but significantly increased x 2wks. Appears multifactorial. Good chance of underlying degenerative changes with potential for more recent or increased soft tissue involvement including collaterals, meniscus, muscles, and tendons. Conservative measures including rest, ice, elevation, and limited mobility (particular at job sites). He can continue brace if benefiting. Can try walking sticks. Continue ibuprofen with food and can also use tylenol but avoid concurrent NSAIDs. He will keep appt with ortho next week. Will leave imaging and other tx to them but discussed potential therapies, etc.  2. Pes planus and bunion - Likely contributing to above.  Ortho to address. 3. Obesity - likely to be directly and indirectly related to above. TLC. 4. HTN - likely secondary to pain and NSAIDs. Continue current for now and monitor. Further planning as warranted. Pt happily agrees with plan. PLEASE NOTE:   This document has been produced using voice recognition software. Unrecognized errors in transcription may be present.     Para Number [employer] of [address] [place] [address]  [phone]  5/3/2018

## 2018-05-04 NOTE — TELEPHONE ENCOUNTER
Patient stated that he doesn't think that test is right because the pain is so bad that he thinks it surpasses Carpal Tunnel Syndrome. Patient stated that splints do nothing for his pain. Advised patient of Dr. Velazquez [place] message of the patient to see a Ortho doctor for an eval.    Patient stated that he has an appointment with Ortho Dr. Marilin Carlos on May 9th and another appointment with a Rheumatologist on May 15th Dr. Ana Victoria.

## 2018-05-17 ENCOUNTER — OFFICE VISIT (OUTPATIENT)
Dept: INTERNAL MEDICINE CLINIC | Age: 38
End: 2018-05-17

## 2018-05-17 VITALS
WEIGHT: 286 LBS | HEART RATE: 128 BPM | TEMPERATURE: 98.2 F | DIASTOLIC BLOOD PRESSURE: 90 MMHG | SYSTOLIC BLOOD PRESSURE: 118 MMHG | OXYGEN SATURATION: 96 % | HEIGHT: 68 IN | RESPIRATION RATE: 14 BRPM | BODY MASS INDEX: 43.35 KG/M2

## 2018-05-17 DIAGNOSIS — F98.8 ATTENTION DEFICIT DISORDER, UNSPECIFIED HYPERACTIVITY PRESENCE: ICD-10-CM

## 2018-05-17 DIAGNOSIS — R05.9 COUGH: Primary | ICD-10-CM

## 2018-05-17 DIAGNOSIS — R06.2 WHEEZE: ICD-10-CM

## 2018-05-17 DIAGNOSIS — R00.0 TACHYCARDIA: ICD-10-CM

## 2018-05-17 RX ORDER — DEXTROAMPHETAMINE SACCHARATE, AMPHETAMINE ASPARTATE, DEXTROAMPHETAMINE SULFATE AND AMPHETAMINE SULFATE 7.5; 7.5; 7.5; 7.5 MG/1; MG/1; MG/1; MG/1
30 TABLET ORAL 2 TIMES DAILY
Qty: 60 TAB | Refills: 0 | Status: SHIPPED | OUTPATIENT
Start: 2018-05-17 | End: 2018-06-18 | Stop reason: SDUPTHER

## 2018-05-17 RX ORDER — PREDNISONE 20 MG/1
TABLET ORAL
Qty: 18 TAB | Refills: 0 | Status: SHIPPED | OUTPATIENT
Start: 2018-05-17 | End: 2018-08-01 | Stop reason: ALTCHOICE

## 2018-05-17 NOTE — PROGRESS NOTES
HPI/History  Candace Shelton is a 34 y.o.  male who presents for evaluation. Pt with cough for about 2 wks. Variably productive and variably colored. Admits to noticing wheeze at times. No malaise, fevers, or other significant sxs. Feels he has environmental allergies. Takes claritin rarely prn but nothing regular of late. No other associated sxs. Pt usually with tachycardia which he reports around 115bpm. Reports he and Dr. Daya Dumont have discussed this in the past. Reports eventually thought to be normal for pt. Denies any cardiopulmonary or other sxs. Pt is on adderall which may play a role; also with above currently which may contribute today. Pt will be seeing Dr. Daya Dumont next month for preop clearance for TKR in July per report.     Patient Active Problem List   Diagnosis Code    Colon polyp 8/05 K63.5    Anxiety F41.9    Dyslipidemia E78.5    Primary hypertension I10    S/P gastric bypass 3/10 Dr Carroll Edgar Z98.84    Morbid obesity with BMI of 40.0-44.9, adult ([facility] E66.01, Z68.41    Moderate episode of recurrent major depressive disorder ([facility] F33.1    Attention deficit hyperactivity disorder (ADHD), predominantly inattentive type F90.0     Past Medical History:   Diagnosis Date    ADD  10/11    Allergic rhinitis     Asthma     Chronic back pain     Colon polyp     f/u colo age 46 per Dr Joleen Roth from 2014    Depression     FHx: heart disease     GERD (gastroesophageal reflux disease)     Hepatic steatosis     HTN (hypertension) 2010    resolved after gastric bypass    Obesity     Gastric bypass 5/10 Dr. Noé Orantes BMI 47, peak weight 367; elvis 195 lbs    FRANCISCA on CPAP     resolved after wt loss    Primary hypertension 3/28/2017    Stomach ulcer 2002    Dr. Joleen Roth    Ulcerative proctitis [facility])      Past Surgical History:   Procedure Laterality Date    BIOPSY LIVER      Dr Jennifer Virgen  03/2017    echo nl lv, ef 60%, mild aortic root dilation    HX BUNIONECTOMY      Dr Trinity Malik; saw Stone Workman then Dr Jan Mccoy later   Kirsten Brenner CHOLECYSTECTOMY  9/08    Dr Verito Field COLONOSCOPY      2002 negative, last 2005 proctitis Dr Char Madera  3/10    Lap Cherri-en-Y BMI 47    HX ORTHOPAEDIC      left hip Dr Cristi Davis age 13     Social History     Social History    Marital status:      Spouse name: N/A    Number of children: 2    Years of education: N/A     Occupational History    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 Family History   Problem Relation Age of Onset    Cancer Maternal Grandmother      colon    Stroke Maternal Grandfather     Diabetes Paternal Grandfather     Heart Disease Paternal Grandfather     Cancer Paternal Grandfather      colon    Arthritis-osteo Mother     Heart Disease Mother     Depression Mother     Hypertension Father     Depression Father     Elevated Lipids Father      Current Outpatient Prescriptions   Medication Sig    dextroamphetamine-amphetamine (ADDERALL) 30 mg tablet Take 1 Tab by mouth two (2) times a day.  predniSONE (DELTASONE) 20 mg tablet Take 3 tabs by mouth daily x 3 days, then 2 tabs daily x 3 days, then 1 tab daily x 3 days.  valACYclovir (VALTREX) 500 mg tablet Take 1 Tab by mouth two (2) times a day. (Patient taking differently: Take 500 mg by mouth two (2) times daily as needed.)    losartan (COZAAR) 25 mg tablet Take 1 Tab by mouth daily. No current facility-administered medications for this visit. Allergies   Allergen Reactions    Lisinopril Cough       Review of Systems  Aside from those included in HPI, remainder of ROS negative.     Physical Examination  Visit Vitals    /90 (BP 1 Location: Right arm, BP Patient Position: Sitting)    Pulse (!) 128    Temp 98.2 °F (36.8 °C) (Oral)    Resp 14    Ht 5' 8\" (1.727 m)    Wt 286 lb (129.7 kg)    SpO2 96%    BMI 43.49 kg/m2   HR recheck: 120bpm    General - Alert and in no acute distress. Pt appears well, comfortable, and in good spirits. Pleasant, engaging. Nontoxic. Not anxious, non-diaphoretic. Mental status - Appropriate mood, behavior, speech content, dress, and thought processes. Eyes - Pupils equal and reactive, extraocular movements intact. No erythema or discharge. Ears - Auditory canals and TMs unremarkable. Nose - No erythema. No rhinorrhea. Mouth - Mucous membranes moist. Pharynx without lesions, swelling, erythema, or exudate. Neck - Supple without rigidity. Lymph - No periauricular, perimandibular, or ant/post cervical tenderness or swelling. Pulm - No cough during visit. Full, complete sentences. No tachypnea, retractions, or cyanosis. Good respiratory effort. Very mild scattered wheeze and mild rhonchi. No appreciable rales. Cardiovascular - Tachycardic, regular rhythm. No appreciable murmurs or gallops. Assessment and Plan  1. Cough with wheeze - Discussed differential causes/contributing factors. Unlikely to be bacterial and no signs of pneumonia. Fluids, prn mucinex, claritin regularly for next few weeks, steroid taper, and albuterol regularly for now (pt has at home). Other supportive measures as needed. Monitor, including temps, and return/call if worsening or developments. 2. Tachycardia - Hx of this. Asymptomatic. May be normal for pt. Med regimen may play a role in general coupled with above in regards to today's rate. He will revisit this with Dr. Moo Friedman at next visit. 3. ADD - Adderall refilled. Discussed potential relation to #2. Further planning as warranted. Pt happily agrees with plan. PLEASE NOTE:   This document has been produced using voice recognition software. Unrecognized errors in transcription may be present.     Miek Castañeda [employer] [facility] of [address] [place] [address]  [phone]  5/17/2018

## 2018-05-17 NOTE — MR AVS SNAPSHOT
[address] 
[phone] Patient: Conchis Cole MRN: [id number] SME:4/5/7497 Visit Information Date & Time Provider Department Dept. Phone Encounter #  
 5/17/2018  3:30 PM Jerman Mcneal [facility] [phone] Upcoming Health Maintenance Date Due DTaP/Tdap/Td series (1 - Tdap) 5/2/1999 Influenza Age 5 to Adult 8/1/2018 COLONOSCOPY 3/28/2027 Allergies as of 5/17/2018  Review Complete On: 5/17/2018 By: Gala Rose LPN Severity Noted Reaction Type Reactions Lisinopril  06/19/2017    Cough Current Immunizations  Reviewed on 5/21/2015 Name Date Influenza Vaccine 11/23/2013 Influenza Vaccine Split 10/5/2011 Influenza Vaccine Whole 9/12/2012, 11/4/2009 Not reviewed this visit You Were Diagnosed With   
  
 Codes Comments Attention deficit hyperactivity disorder (ADHD), predominantly inattentive type    -  Primary ICD-10-CM: F90.0 ICD-9-CM: 314.00 Attention deficit disorder, unspecified hyperactivity presence     ICD-10-CM: F98.8 ICD-9-CM: 314.00 Vitals BP Pulse Temp Resp Height(growth percentile) Weight(growth percentile)  
 118/90 (BP 1 Location: Right arm, BP Patient Position: Sitting) (!) 128 98.2 °F (36.8 °C) (Oral) 14 5' 8\" (1.727 m) 286 lb (129.7 kg) SpO2 BMI Smoking Status 96% 43.49 kg/m2 Former Smoker Vitals History BMI and BSA Data Body Mass Index Body Surface Area  
 43.49 kg/m 2 2.49 m 2 Preferred Pharmacy Pharmacy Name Phone [address], [address] [place] [address] [phone] [facility] [phone] Your Updated Medication List  
  
   
This list is accurate as of 5/17/18  3:39 PM.  Always use your most recent med list.  
  
  
  
  
 dextroamphetamine-amphetamine 30 mg tablet Commonly known as:  ADDERALL Take 1 Tab by mouth two (2) times a day. losartan 25 mg tablet Commonly known as:  COZAAR Take 1 Tab by mouth daily. valACYclovir 500 mg tablet Commonly known as:  VALTREX Take 1 Tab by mouth two (2) times a day. Prescriptions Printed Refills  
 dextroamphetamine-amphetamine (ADDERALL) 30 mg tablet 0 Sig: Take 1 Tab by mouth two (2) times a day. Class: Print Route: Oral  
  
Introducing [facility]! Dear Adrianna Baugh: 
Thank you for requesting a [employer] account. Our records indicate that you already have an active GREE account. You can access your account anytime at [email or web address] Did you know that you can access your hospital and ER discharge instructions at any time in GREE? You can also review all of your test results from your hospital stay or ER visit. Additional Information If you have questions, please visit the Frequently Asked Questions section of the GREE website at [email or web address] Remember, GREE is NOT to be used for urgent needs. For medical emergencies, dial 911. Now available from your iPhone and Android! Please provide this summary of care documentation to your next provider. Your primary care clinician is listed as Fannie Obrien. If you have any questions after today's visit, please call [phone].

## 2018-05-17 NOTE — PROGRESS NOTES
1. Have you been to the ER, urgent care clinic or hospitalized since your last visit? NO.     2. Have you seen or consulted any other health care providers outside of the [facility] since your last visit (Include any pap smears or colon screening)? NO      Do you have an Advanced Directive? NO    Would you like information on Advanced Directives?  NO

## 2018-06-18 DIAGNOSIS — F98.8 ATTENTION DEFICIT DISORDER, UNSPECIFIED HYPERACTIVITY PRESENCE: ICD-10-CM

## 2018-06-18 RX ORDER — DEXTROAMPHETAMINE SACCHARATE, AMPHETAMINE ASPARTATE, DEXTROAMPHETAMINE SULFATE AND AMPHETAMINE SULFATE 7.5; 7.5; 7.5; 7.5 MG/1; MG/1; MG/1; MG/1
30 TABLET ORAL 2 TIMES DAILY
Qty: 60 TAB | Refills: 0 | Status: SHIPPED | OUTPATIENT
Start: 2018-06-18 | End: 2018-08-01

## 2018-06-18 NOTE — TELEPHONE ENCOUNTER
[facility] date for Adderall as 05/28/2018. There appears to be no inconsistencies in regards to the prescribing of this medication. Last Visit: 05/17/2018 with ANIKA [address]    Next Appointment: none   Previous Refill Encounters: 05/17/2018 per ANIKA Tesfaye #[phone]    Requested Prescriptions     Pending Prescriptions Disp Refills    dextroamphetamine-amphetamine (ADDERALL) 30 mg tablet 60 Tab 0     Sig: Take 1 Tab by mouth two (2) times a dayEarliest Fill Date: 6/18/18.

## 2018-07-01 DIAGNOSIS — I10 PRIMARY HYPERTENSION: ICD-10-CM

## 2018-07-02 RX ORDER — LOSARTAN POTASSIUM 25 MG/1
TABLET ORAL
Qty: 30 TAB | Refills: 11 | Status: SHIPPED | OUTPATIENT
Start: 2018-07-02 | End: 2018-08-01 | Stop reason: DRUGHIGH

## 2018-07-26 ENCOUNTER — TELEPHONE (OUTPATIENT)
Dept: INTERNAL MEDICINE CLINIC | Age: 38
End: 2018-07-26

## 2018-07-26 NOTE — TELEPHONE ENCOUNTER
Patient is calling again. I advised him that Dr. River Thayer is out of the office until Tuesday. He wants to know if something can be called in by the on call Dr. He doesn't want to have to go until Tuesday without sleep.

## 2018-07-26 NOTE — TELEPHONE ENCOUNTER
Please instruct him to use Melatonin that is over the counter 30-45 minutes prior to bedtime and follow up with PCP.

## 2018-07-26 NOTE — TELEPHONE ENCOUNTER
Pt called and req a sleep medicine, he says he's up all hours of the night and can''t fall back to sleep, RD

## 2018-07-29 NOTE — TELEPHONE ENCOUNTER
First thing to do is stop the 2nd dose of adderall as it could be causing problems w lasting into the nighttime    Call w update after he does that

## 2018-08-01 ENCOUNTER — TELEPHONE (OUTPATIENT)
Dept: INTERNAL MEDICINE CLINIC | Age: 38
End: 2018-08-01

## 2018-08-01 ENCOUNTER — OFFICE VISIT (OUTPATIENT)
Dept: INTERNAL MEDICINE CLINIC | Age: 38
End: 2018-08-01

## 2018-08-01 VITALS
OXYGEN SATURATION: 97 % | WEIGHT: 296 LBS | HEART RATE: 99 BPM | TEMPERATURE: 98.2 F | SYSTOLIC BLOOD PRESSURE: 160 MMHG | BODY MASS INDEX: 44.86 KG/M2 | DIASTOLIC BLOOD PRESSURE: 100 MMHG | HEIGHT: 68 IN | RESPIRATION RATE: 14 BRPM

## 2018-08-01 DIAGNOSIS — I10 PRIMARY HYPERTENSION: Primary | ICD-10-CM

## 2018-08-01 DIAGNOSIS — M25.561 RIGHT KNEE PAIN, UNSPECIFIED CHRONICITY: ICD-10-CM

## 2018-08-01 DIAGNOSIS — Z72.0 TOBACCO USE: ICD-10-CM

## 2018-08-01 DIAGNOSIS — Z71.89 ENCOUNTER FOR MEDICATION COUNSELING: ICD-10-CM

## 2018-08-01 DIAGNOSIS — Z98.84 S/P GASTRIC BYPASS: ICD-10-CM

## 2018-08-01 RX ORDER — DICLOFENAC SODIUM 10 MG/G
4 GEL TOPICAL 4 TIMES DAILY
Qty: 100 G | Refills: 2 | Status: SHIPPED | OUTPATIENT
Start: 2018-08-01 | End: 2018-09-13

## 2018-08-01 RX ORDER — CLONIDINE HYDROCHLORIDE 0.1 MG/1
0.1 TABLET ORAL
COMMUNITY
End: 2022-03-19

## 2018-08-01 RX ORDER — BUPROPION HYDROCHLORIDE 300 MG/1
150 TABLET ORAL
COMMUNITY
End: 2018-09-13

## 2018-08-01 RX ORDER — LOSARTAN POTASSIUM 100 MG/1
TABLET ORAL
Qty: 90 TAB | Refills: 0 | Status: SHIPPED | OUTPATIENT
Start: 2018-08-01 | End: 2018-09-13

## 2018-08-01 RX ORDER — BUSPIRONE HYDROCHLORIDE 10 MG/1
10 TABLET ORAL 2 TIMES DAILY
COMMUNITY
End: 2020-04-27

## 2018-08-01 RX ORDER — TRAZODONE HYDROCHLORIDE 50 MG/1
50 TABLET ORAL
COMMUNITY

## 2018-08-01 NOTE — TELEPHONE ENCOUNTER
Chandu Brady NP from [facility] calling has pt in office with high bp 162/109 hr 97    Scheduled pt at 1130am today with Janice Perry

## 2018-08-01 NOTE — PROGRESS NOTES
HPI/History  Ron Lindsey is a 34 y.o.  male who presents for evaluation. Pt undergoing rehab/tx for what sounds to be gambling addiction and adderall use/misuse/similar. He was seen earlier today at [facility] and noted to have elevated BP (162/109) and sent here for further eval. Pt has noticed elevated readings at home/outside checks as well, which are reportedly similar. Reports taking his losartan 25mg daily. Denies cardiopulmonary sxs. Pt has numerous risk factors as well as potential contributing factors. These include (but not limited to) obesity, s/p gastric bypass and taking ibuprofen regularly, knee pain, smokeless tobacco use, anxiety, and potential medication effects. Also, pt reports frequently with tobacco use around time of BP checks. Also of note, rather significant med changes recently, including earlier today. Psych decreased wellbutrin and added buspar; clonidine was prescribed for insomnia earlier today (pt has not filled/used) and was also given script for trazodone in case clonidine ineffective. Pt reports he is due for visit with Dr. Eliot Song and was planning for an appt after his next psych appt on 8/27.     Patient Active Problem List   Diagnosis Code    Colon polyp 8/05 K63.5    Anxiety F41.9    Dyslipidemia E78.5    Primary hypertension I10    S/P gastric bypass 3/10 Dr Shekhar Davis Z98.84    Morbid obesity with BMI of 40.0-44.9, adult ([facility] E66.01, Z68.41    Moderate episode of recurrent major depressive disorder ([facility] F33.1    Attention deficit hyperactivity disorder (ADHD), predominantly inattentive type F90.0     Past Medical History:   Diagnosis Date    ADD  10/11    Allergic rhinitis     Asthma     Chronic back pain     Colon polyp     f/u colo age 46 per Dr Shahzad Vilchis from 2014    Depression     FHx: heart disease     GERD (gastroesophageal reflux disease)     Hepatic steatosis     HTN (hypertension) 2010    resolved after gastric bypass    Obesity Gastric bypass 5/10 Dr. Taryn Ramírez BMI 54, peak weight 367; elvis 195 lbs    FRANCISCA on CPAP     resolved after wt loss    Primary hypertension 3/28/2017    Stomach ulcer 2002    Dr. April Delgadillo Ulcerative proctitis [facility])      Past Surgical History:   Procedure Laterality Date    BIOPSY LIVER      Dr Diane Potter  03/2017    echo nl lv, ef 60%, mild aortic root dilation    HX BUNIONECTOMY      Dr Norm Hamm; saw [id number] then Dr Jr Fry later    HX CHOLECYSTECTOMY  9/08    Dr Je Briones      2002 negative, last 2005 proctitis Dr Richardson Kaba  3/10    Lap Cherri-en-Y BMI 47    HX ORTHOPAEDIC      left hip Dr Teresa Ellis age 13     Social History     Social History    Marital status:      Spouse name: N/A    Number of children: 2    Years of education: N/A     Occupational History    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 Family History   Problem Relation Age of Onset    Cancer Maternal Grandmother      colon    Stroke Maternal Grandfather     Diabetes Paternal Grandfather     Heart Disease Paternal Grandfather     Cancer Paternal Grandfather      colon    Arthritis-osteo Mother     Heart Disease Mother     Depression Mother     Hypertension Father     Depression Father     Elevated Lipids Father      Current Outpatient Prescriptions   Medication Sig    buPROPion XL (WELLBUTRIN XL) 300 mg XL tablet Take 150 mg by mouth every morning.  cloNIDine HCl (CATAPRES) 0.1 mg tablet Take  by mouth nightly.  traZODone (DESYREL) 50 mg tablet Take  by mouth two (2) times a day.     busPIRone (BUSPAR) 10 mg tablet Take 10 mg by mouth two (2) times a day.    diclofenac (VOLTAREN) 1 % gel Apply 4 g to affected area four (4) times daily.  losartan (COZAAR) 100 mg tablet Take 0.5 tab by mouth daily.  valACYclovir (VALTREX) 500 mg tablet Take 1 Tab by mouth two (2) times a day. (Patient taking differently: Take 500 mg by mouth two (2) times daily as needed.)     No current facility-administered medications for this visit. Allergies   Allergen Reactions    Lisinopril Cough       Review of Systems  Aside from those included in HPI, remainder of ROS negative. Physical Examination  Visit Vitals    BP (!) 160/100 (BP 1 Location: Right arm, BP Patient Position: Sitting)    Pulse 99    Temp 98.2 °F (36.8 °C) (Oral)    Resp 14    Ht 5' 8\" (1.727 m)    Wt 296 lb (134.3 kg)    SpO2 97%    BMI 45.01 kg/m2       General - Alert and in no acute distress. Pt appears well, comfortable, and in good spirits. Very pleasant and engaging. Nontoxic. Not anxious, non-diaphoretic. Mental status - Appropriate mood, behavior, speech content, dress, and thought processes. Eyes - Pupils equal and reactive, extraocular movements intact. No erythema or discharge. Vision intact. Pulm - No tachypnea, retractions, or cyanosis. Good respiratory effort. Clear to auscultation bilat. Cardiovascular - Normal rate, regular rhythm. No appreciable murmurs or gallops. Assessment and Plan  1. HTN - Not well controlled which appears multifactorial. Pt with multiple risk/potential factors. Discussed regimen/dosing and pt ultimately agreed to 50mg losartan and monitor BP closely. I have provided 100mg tablet in case further increase, which is likely. Will check BMP today. Will f/u in a few weeks, which will likely be at his appt with Dr. Naif Cristobal. Will need BMP recheck at some point and pt aware of this. Otherwise, we discussed tobacco cessation, TLC including diet and weight loss, and will d/c oral NSAIDs. I prescribed voltaren gel for his knee.   2. Obesity - TLC discussed. 3. Right knee pain - Both pain and NSAIDs likely contributing to elevated BP. Pt s/p gastric bypass and should avoid NSAIDs. Switch to voltaren gel as noted. 4. Tobacco use - Encouraged cessation. 5. Pt will continue with psych. Discussed his new medications, including clonidine's various effects/uses. Pt will schedule f/u with Dr. Casper Hong, return sooner if needed, developments, and/or pending BP readings. Further planning as warranted. Pt happily agrees with plan. PLEASE NOTE:   This document has been produced using voice recognition software. Unrecognized errors in transcription may be present.     Skip Lindo [employer] [place] of [facility]  [phone]  8/1/2018

## 2018-08-01 NOTE — PROGRESS NOTES
1. Have you been to the ER, urgent care clinic or hospitalized since your last visit? [place] [address]    2. Have you seen or consulted any other health care providers outside of the [facility] since your last visit (Include any pap smears or colon screening)? NO      Do you have an Advanced Directive? NO    Would you like information on Advanced Directives?  NO

## 2018-08-01 NOTE — MR AVS SNAPSHOT
[address] [facility] 
 
 
 [address], [address] [facility] 
[phone] Patient: Kris Box MRN: [id number] EZ Visit Information Date & Time Provider Department Dept. Phone Encounter #  
 2018 11:30 AM Army Hern[facility]dezter, [address] [facility] [address] [place] [address] [phone] [id number] Your Appointments 2018 10:40 AM  
Office Visit with Shruthi Long MD  
[facility] of [address] [place] [address] Appt Note: ov w/rd per bm  
 [address] [facility], [address] Sarina Jose [address]  
  
   
 [address], [address] Upcoming Health Maintenance Date Due DTaP/Tdap/Td series (1 - Tdap) 1999 Influenza Age 5 to Adult 2018 COLONOSCOPY 3/28/2027 Allergies as of 2018  Review Complete On: 2018 By: Kiki Velazquez LPN Severity Noted Reaction Type Reactions Lisinopril  2017    Cough Current Immunizations  Reviewed on 2015 Name Date Influenza Vaccine 2013 Influenza Vaccine Split 10/5/2011 Influenza Vaccine Whole 2012, 2009 Not reviewed this visit You Were Diagnosed With   
  
 Codes Comments Primary hypertension    -  Primary ICD-10-CM: I10 
ICD-9-CM: 401.9 Right knee pain, unspecified chronicity     ICD-10-CM: M25.561 ICD-9-CM: 719.46 S/P gastric bypass     ICD-10-CM: O58.05 ICD-9-CM: V45.86 Vitals BP Pulse Temp Resp Height(growth percentile) Weight(growth percentile) (!) 160/100 (BP 1 Location: Right arm, BP Patient Position: Sitting) 99 98.2 °F (36.8 °C) (Oral) 14 5' 8\" (1.727 m) 296 lb (134.3 kg) SpO2 BMI Smoking Status 97% 45.01 kg/m2 Former Smoker Vitals History BMI and BSA Data Body Mass Index Body Surface Area 45.01 kg/m 2 2.54 m 2 Preferred Pharmacy Pharmacy Name Phone [facility] [address] [facility], [address] [facility] [phone] Your Updated Medication List  
  
   
This list is accurate as of 8/1/18 11:51 AM.  Always use your most recent med list.  
  
  
  
  
 busPIRone 10 mg tablet Commonly known as:  BUSPAR Take 10 mg by mouth two (2) times a day. cloNIDine HCl 0.1 mg tablet Commonly known as:  CATAPRES Take  by mouth nightly. dextroamphetamine-amphetamine 30 mg tablet Commonly known as:  ADDERALL Take 1 Tab by mouth two (2) times a dayEarliest Fill Date: 6/18/18. diclofenac 1 % Gel Commonly known as:  VOLTAREN Apply 4 g to affected area four (4) times daily. losartan 100 mg tablet Commonly known as:  COZAAR Take 0.5 tab by mouth daily. traZODone 50 mg tablet Commonly known as:  Cornelious Haymaker Take  by mouth two (2) times a day. valACYclovir 500 mg tablet Commonly known as:  VALTREX Take 1 Tab by mouth two (2) times a day. WELLBUTRIN  mg XL tablet Generic drug:  buPROPion XL Take 150 mg by mouth every morning. Prescriptions Printed Refills  
 diclofenac (VOLTAREN) 1 % gel 2 Sig: Apply 4 g to affected area four (4) times daily. Class: Print Route: Topical  
 losartan (COZAAR) 100 mg tablet 0 Sig: Take 0.5 tab by mouth daily. Class: Print Introducing [facility]! Dear Fabi Castellanos: 
Thank you for requesting a [employer] account. Our records indicate that you already have an active [employer] account. You can access your account anytime at [email or web address] Did you know that you can access your hospital and ER discharge instructions at any time in [employer]? You can also review all of your test results from your hospital stay or ER visit. Additional Information If you have questions, please visit the Frequently Asked Questions section of the [employer] website at [email or web address] Remember, MyChart is NOT to be used for urgent needs. For medical emergencies, dial 911. Now available from your iPhone and Android! Please provide this summary of care documentation to your next provider. Your primary care clinician is listed as Rohit Herzog. If you have any questions after today's visit, please call [phone].

## 2018-08-02 ENCOUNTER — TELEPHONE (OUTPATIENT)
Dept: INTERNAL MEDICINE CLINIC | Age: 38
End: 2018-08-02

## 2018-08-02 LAB
ANION GAP SERPL CALC-SCNC: 18 MMOL/L
BUN SERPL-MCNC: 16 MG/DL (ref 6–22)
CALCIUM SERPL-MCNC: 9.5 MG/DL (ref 8.4–10.4)
CHLORIDE SERPL-SCNC: 96 MMOL/L (ref 98–110)
CO2 SERPL-SCNC: 25 MMOL/L (ref 20–32)
CREAT SERPL-MCNC: 0.8 MG/DL (ref 0.5–1.2)
GFRAA, 66117: >60
GFRNA, 66118: >60
GLUCOSE SERPL-MCNC: 80 MG/DL (ref 70–99)
POTASSIUM SERPL-SCNC: 4.6 MMOL/L (ref 3.5–5.5)
SODIUM SERPL-SCNC: 139 MMOL/L (ref 133–145)

## 2018-08-02 NOTE — TELEPHONE ENCOUNTER
ANIKA Manuel 1 hour ago (7:27 AM)                 BMP looks fine. Continue as discussed.        LMTCB, trying to merge both encounters, need to give him both messages

## 2018-09-04 ENCOUNTER — HOSPITAL ENCOUNTER (OUTPATIENT)
Dept: PREADMISSION TESTING | Age: 38
Discharge: HOME | End: 2018-09-04
Attending: ORTHOPAEDIC SURGERY
Payer: COMMERCIAL

## 2018-09-04 DIAGNOSIS — M17.11 OSTEOARTHRITIS OF RIGHT KNEE: ICD-10-CM

## 2018-09-04 DIAGNOSIS — Z01.818 PREOP EXAMINATION: ICD-10-CM

## 2018-09-04 DIAGNOSIS — Z01.812 BLOOD TESTS PRIOR TO TREATMENT OR PROCEDURE: ICD-10-CM

## 2018-09-04 LAB
ALBUMIN SERPL-MCNC: 3.7 G/DL (ref 3.4–5)
ALBUMIN/GLOB SERPL: 0.9 {RATIO} (ref 0.8–1.7)
ALP SERPL-CCNC: 102 U/L (ref 45–117)
ALT SERPL-CCNC: 22 U/L (ref 16–61)
ANION GAP SERPL CALC-SCNC: 8 MMOL/L (ref 3–18)
APPEARANCE UR: CLEAR
APTT PPP: 21.7 SEC (ref 23–36.4)
AST SERPL-CCNC: 26 U/L (ref 15–37)
ATRIAL RATE: 95 BPM
BACTERIA SPEC CULT: NORMAL
BASOPHILS # BLD: 0 K/UL (ref 0–0.1)
BASOPHILS NFR BLD: 0 % (ref 0–2)
BILIRUB SERPL-MCNC: 0.5 MG/DL (ref 0.2–1)
BILIRUB UR QL: NEGATIVE
BUN SERPL-MCNC: 15 MG/DL (ref 7–18)
BUN/CREAT SERPL: 15 (ref 12–20)
CALCIUM SERPL-MCNC: 9.4 MG/DL (ref 8.5–10.1)
CALCULATED P AXIS, ECG09: 44 DEGREES
CALCULATED R AXIS, ECG10: 3 DEGREES
CALCULATED T AXIS, ECG11: 32 DEGREES
CHLORIDE SERPL-SCNC: 103 MMOL/L (ref 100–108)
CO2 SERPL-SCNC: 29 MMOL/L (ref 21–32)
COLOR UR: YELLOW
CREAT SERPL-MCNC: 1 MG/DL (ref 0.6–1.3)
DIAGNOSIS, 93000: NORMAL
DIFFERENTIAL METHOD BLD: ABNORMAL
EOSINOPHIL # BLD: 0.1 K/UL (ref 0–0.4)
EOSINOPHIL NFR BLD: 1 % (ref 0–5)
ERYTHROCYTE [DISTWIDTH] IN BLOOD BY AUTOMATED COUNT: 13.4 % (ref 11.6–14.5)
ERYTHROCYTE [SEDIMENTATION RATE] IN BLOOD: 19 MM/HR (ref 0–15)
GLOBULIN SER CALC-MCNC: 4.2 G/DL (ref 2–4)
GLUCOSE SERPL-MCNC: 75 MG/DL (ref 74–99)
GLUCOSE UR STRIP.AUTO-MCNC: NEGATIVE MG/DL
HBA1C MFR BLD: 5.8 % (ref 4.5–5.6)
HCT VFR BLD AUTO: 43.3 % (ref 36–48)
HGB BLD-MCNC: 14.5 G/DL (ref 13–16)
HGB UR QL STRIP: NEGATIVE
INR PPP: 0.9 (ref 0.8–1.2)
KETONES UR QL STRIP.AUTO: NEGATIVE MG/DL
LEUKOCYTE ESTERASE UR QL STRIP.AUTO: NEGATIVE
LYMPHOCYTES # BLD: 2.1 K/UL (ref 0.9–3.6)
LYMPHOCYTES NFR BLD: 21 % (ref 21–52)
MCH RBC QN AUTO: 29.8 PG (ref 24–34)
MCHC RBC AUTO-ENTMCNC: 33.5 G/DL (ref 31–37)
MCV RBC AUTO: 89.1 FL (ref 74–97)
MONOCYTES # BLD: 0.6 K/UL (ref 0.05–1.2)
MONOCYTES NFR BLD: 6 % (ref 3–10)
NEUTS SEG # BLD: 7 K/UL (ref 1.8–8)
NEUTS SEG NFR BLD: 72 % (ref 40–73)
NITRITE UR QL STRIP.AUTO: NEGATIVE
P-R INTERVAL, ECG05: 154 MS
PH UR STRIP: 7 [PH] (ref 5–8)
PLATELET # BLD AUTO: 265 K/UL (ref 135–420)
PMV BLD AUTO: 9.1 FL (ref 9.2–11.8)
POTASSIUM SERPL-SCNC: 4.5 MMOL/L (ref 3.5–5.5)
PROT SERPL-MCNC: 7.9 G/DL (ref 6.4–8.2)
PROT UR STRIP-MCNC: NEGATIVE MG/DL
PROTHROMBIN TIME: 11.6 SEC (ref 11.5–15.2)
Q-T INTERVAL, ECG07: 348 MS
QRS DURATION, ECG06: 86 MS
QTC CALCULATION (BEZET), ECG08: 437 MS
RBC # BLD AUTO: 4.86 M/UL (ref 4.7–5.5)
SERVICE CMNT-IMP: NORMAL
SODIUM SERPL-SCNC: 140 MMOL/L (ref 136–145)
SP GR UR REFRACTOMETRY: 1.02 (ref 1–1.03)
UROBILINOGEN UR QL STRIP.AUTO: 1 EU/DL (ref 0.2–1)
VENTRICULAR RATE, ECG03: 95 BPM
WBC # BLD AUTO: 9.8 K/UL (ref 4.6–13.2)

## 2018-09-04 PROCEDURE — 85652 RBC SED RATE AUTOMATED: CPT | Performed by: ORTHOPAEDIC SURGERY

## 2018-09-04 PROCEDURE — 80053 COMPREHEN METABOLIC PANEL: CPT | Performed by: ORTHOPAEDIC SURGERY

## 2018-09-04 PROCEDURE — 83036 HEMOGLOBIN GLYCOSYLATED A1C: CPT | Performed by: ORTHOPAEDIC SURGERY

## 2018-09-04 PROCEDURE — 85610 PROTHROMBIN TIME: CPT | Performed by: ORTHOPAEDIC SURGERY

## 2018-09-04 PROCEDURE — 87641 MR-STAPH DNA AMP PROBE: CPT | Performed by: ORTHOPAEDIC SURGERY

## 2018-09-04 PROCEDURE — 85025 COMPLETE CBC W/AUTO DIFF WBC: CPT | Performed by: ORTHOPAEDIC SURGERY

## 2018-09-04 PROCEDURE — 36415 COLL VENOUS BLD VENIPUNCTURE: CPT | Performed by: ORTHOPAEDIC SURGERY

## 2018-09-04 PROCEDURE — 93005 ELECTROCARDIOGRAM TRACING: CPT

## 2018-09-04 PROCEDURE — 85730 THROMBOPLASTIN TIME PARTIAL: CPT | Performed by: ORTHOPAEDIC SURGERY

## 2018-09-04 PROCEDURE — 81003 URINALYSIS AUTO W/O SCOPE: CPT | Performed by: ORTHOPAEDIC SURGERY

## 2018-09-11 RX ORDER — VALACYCLOVIR HYDROCHLORIDE 500 MG/1
500 TABLET, FILM COATED ORAL 2 TIMES DAILY
Qty: 60 TAB | Refills: 11 | Status: SHIPPED | OUTPATIENT
Start: 2018-09-11 | End: 2022-03-07 | Stop reason: SDUPTHER

## 2018-09-13 NOTE — PROGRESS NOTES
34 y.o. WHITE OR  male who presents for med clearance    He will be undergoing R medial epicondyle vs total knee replacement by Dr Delores Mcclelland in the near future    Denies any cardiovascular complaints although not able to exercise much due to above issues    Weight continues to go up and he admits the diet could be better. He is open to trying fasting regimen to see if he can lose weight    Vitals 9/17/2018 9/13/2018 8/1/2018 5/17/2018 5/3/2018   Weight 299 lb 290 lb 296 lb 286 lb 293 lb 9.6 oz     Vitals 1/2/2018   Weight 282 lb     No GI or gu complaints currently. Sleep apnea resolved after the weight loss and he does not feel that it's back as yet even w the recent wt gain    Anxiety is controlled, he remains on the Adderall for ADD.     IF 9/18    Past Medical History:   Diagnosis Date    ADD  10/11    Allergic rhinitis     Arthritis     Asthma     as a child    Chronic back pain     Colon polyp     f/u colo age 46 per Dr Giovani Ovalle from 2014    Depression     FHx: heart disease     GERD (gastroesophageal reflux disease)     Hepatic steatosis     HTN (hypertension) 2010    resolved after gastric bypass    Obesity     s/p gastric bypass 5/10 Dr. Radha Camarena BMI 54, peak weight 367; elvis 195 lbs; failed qsymia 2017-18; IF 9/18 start weight 299 lbs    FRANCISCA on CPAP     resolved after wt loss    Primary hypertension 3/28/2017    Stomach ulcer 2002    Dr. Giovani Ovalle    Ulcerative proctitis [facility])      Past Surgical History:   Procedure Laterality Date    BIOPSY LIVER      Dr Hardy Gauthier  03/2017    echo nl lv, ef 60%, mild aortic root dilation    HX BUNIONECTOMY      Dr Sho Patel; saw [id number] then Dr Seun Cotton later    HX CHOLECYSTECTOMY  9/08    Dr Leonid Powell      2002 negative, last 2005 proctitis Dr Edwin Amin  3/10    Lap Cherri-en-Y BMI 47 Dr Abby Glaser preop weight 367 lbs    HX ORTHOPAEDIC Left     left hip Dr Otilio Stewart age 13     Social History     Social History    Marital status:      Spouse name: N/A    Number of children: 2    Years of education: N/A     Occupational History    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 Current Outpatient Prescriptions   Medication Sig    escitalopram oxalate (LEXAPRO) 20 mg tablet Take 20 mg by mouth daily.  losartan (COZAAR) 50 mg tablet Take 50 mg by mouth daily.  buPROPion XL (WELLBUTRIN XL) 150 mg tablet Take 150 mg by mouth every morning.  cyanocobalamin (VITAMIN B-12) 1,000 mcg sublingual tablet Take 1,000 mcg by mouth daily.  ibuprofen (MOTRIN) 800 mg tablet Take 800 mg by mouth every eight (8) hours as needed for Pain.  valACYclovir (VALTREX) 500 mg tablet Take 1 Tab by mouth two (2) times a day. (Patient taking differently: Take 500 mg by mouth as needed.)    cloNIDine HCl (CATAPRES) 0.1 mg tablet Take 0.1 mg by mouth nightly.  traZODone (DESYREL) 50 mg tablet Take 50 mg by mouth nightly.  busPIRone (BUSPAR) 10 mg tablet Take 10 mg by mouth two (2) times a day. No current facility-administered medications for this visit.       Allergies   Allergen Reactions    Lisinopril Cough     REVIEW OF SYSTEMS: colo 2005 Dr Josey Sutherland  Ophtho - no vision change or eye pain  Oral - no mouth pain, tongue or tooth problems  Ears - no hearing loss, ear pain, fullness, no swallowing problems  Cardiac - no CP, PND, orthopnea, edema, palpitations or syncope  Chest - no breast masses  Resp - no wheezing, chronic coughing, dyspnea  GI - no heartburn, nausea, vomiting, change in bowel habits, bleeding, hemorrhoids  Urinary - no dysuria, hematuria, flank pain, urgency, frequency  Genitals - no genital lesions, discharge, masses, ulceration, warts  Ortho - no swelling, dec ROM, myalgias  Derm - no nail abnormalities, rashes, lesions of note, hair loss  Psych - denies any anxiety or depression symptoms, no hallucinations or violent ideation  Constitutional - no wt loss, night sweats, unexplained fevers  Neuro - no focal weakness, numbness, paresthesias, incoordination, ataxia, involuntary movements  Endo - no polyuria, polydipsia, nocturia, hot flashes    Visit Vitals    /82    Pulse 95    Temp 98.4 °F (36.9 °C) (Oral)    Resp 14    Ht 5' 8\" (1.727 m)    Wt 299 lb (135.6 kg)    SpO2 97%    BMI 45.46 kg/m2     Affect is appropriate. Mood stable  No apparent distress  HEENT --Anicteric sclerae, . No thyromegaly, JVD, or bruits. Lungs --Clear to auscultation and percussion, normal percussion. Heart --Regular rate and rhythm, no murmurs, rubs, gallops, or clicks. Chest wall --Nontender to palpation. PMI normal.  Abdomen -- Soft and nontender, no hepatosplenomegaly or masses. Extremities -- Without cyanosis, clubbing, edema. 2+ pulses equally and bilaterally.     LABS  From 10/06 showed gluc 92, cr 0.80,   alt 25,         chol 207, tg 207, hdl 36, ldl-c 125, wbc 6.6, hb 14.0, plt 294  From 1/10 showed   gluc 99, cr 0.80, gfr>60,         chol 237, tg 294, hdl 45, ldl-c 133, wbc 9.3, hb 14.1, plt 285, tsh 2.76  From 9/10 showed             chol 172, tg 184, hdl 39, ldl-c 96,                                                    b12 515, fol 7.0  From 3/11 showed              chol 188, tg 146, hdl 47, ldl-c 112, wbc 6.9, hb 14.8, plt 253,                                b12 506, fol 7.6  From 4/14 showed   gluc 82, cr 0.70, gfr>60, alt 9,          chol 170, tg 62,   hdl 63, ldl-c 95,   wbc 5.7, hb 14.6, plt 238,            fe 90, %sat 27, ferritin 76, b12 205,        vit d 17.4, vit b1 16.4  From 5/15 showed   gluc 93, cr 0.80, gfr>60, alt 7,          chol 214, tg 121, hdl 65, ldl-c 125, wbc 7.9, hb 15.6, plt 268,                                          vit d 34.8  From 3/17 showed   gluc 64, cr 0.70, gfr>60, alt 18,               wbc 8.8, hb 15.3, plt 314, tsh 2.06  From 3/17 showed      hba1c 5.7, chol 225, tg 108, hdl 54, ldl-c 149,                      fe 91, %sat 23, ferritin 36, b12>2k, fol 5.8, vit d 21.7, vit b1 180    Results for orders placed or performed during the hospital encounter of 09/04/18   MRSA SCREEN - PCR (NASAL)   Result Value Ref Range    Special Requests: NO SPECIAL REQUESTS      Culture result:        MRSA target DNA is not detected (presumptive not colonized with MRSA)   CBC WITH AUTOMATED DIFF   Result Value Ref Range    WBC 9.8 4.6 - 13.2 K/uL    RBC 4.86 4.70 - 5.50 M/uL    HGB 14.5 13.0 - 16.0 g/dL    HCT 43.3 36.0 - 48.0 %    MCV 89.1 74.0 - 97.0 FL    MCH 29.8 24.0 - 34.0 PG    MCHC 33.5 31.0 - 37.0 g/dL    RDW 13.4 11.6 - 14.5 %    PLATELET 065 994 - 268 K/uL    MPV 9.1 (L) 9.2 - 11.8 FL    NEUTROPHILS 72 40 - 73 %    LYMPHOCYTES 21 21 - 52 %    MONOCYTES 6 3 - 10 %    EOSINOPHILS 1 0 - 5 %    BASOPHILS 0 0 - 2 %    ABS. NEUTROPHILS 7.0 1.8 - 8.0 K/UL    ABS. LYMPHOCYTES 2.1 0.9 - 3.6 K/UL    ABS. MONOCYTES 0.6 0.05 - 1.2 K/UL    ABS. EOSINOPHILS 0.1 0.0 - 0.4 K/UL    ABS. BASOPHILS 0.0 0.0 - 0.1 K/UL    DF AUTOMATED     METABOLIC PANEL, COMPREHENSIVE   Result Value Ref Range    Sodium 140 136 - 145 mmol/L    Potassium 4.5 3.5 - 5.5 mmol/L    Chloride 103 100 - 108 mmol/L    CO2 29 21 - 32 mmol/L    Anion gap 8 3.0 - 18 mmol/L    Glucose 75 74 - 99 mg/dL    BUN 15 7.0 - 18 MG/DL    Creatinine 1.00 0.6 - 1.3 MG/DL    BUN/Creatinine ratio 15 12 - 20      GFR est AA >60 >60 ml/min/1.73m2    GFR est non-AA >60 >60 ml/min/1.73m2    Calcium 9.4 8.5 - 10.1 MG/DL    Bilirubin, total 0.5 0.2 - 1.0 MG/DL    ALT (SGPT) 22 16 - 61 U/L    AST (SGOT) 26 15 - 37 U/L    Alk.  phosphatase 102 45 - 117 U/L    Protein, total 7.9 6.4 - 8.2 g/dL    Albumin 3.7 3.4 - 5.0 g/dL    Globulin 4.2 (H) 2.0 - 4.0 g/dL    A-G Ratio 0.9 0.8 - 1.7     SED RATE (ESR)   Result Value Ref Range    Sed rate, automated 19 (H) 0 - 15 mm/hr   HEMOGLOBIN A1C W/O EAG   Result Value Ref Range    Hemoglobin A1c 5.8 (H) 4.5 - 5.6 %   PROTHROMBIN TIME + INR   Result Value Ref Range    Prothrombin time 11.6 11.5 - 15.2 sec    INR 0.9 0.8 - 1.2     PTT   Result Value Ref Range    aPTT 21.7 (L) 23.0 - 36.4 SEC   URINALYSIS W/ RFLX MICROSCOPIC   Result Value Ref Range    Color YELLOW      Appearance CLEAR      Specific gravity 1.019 1.005 - 1.030      pH (UA) 7.0 5.0 - 8.0      Protein NEGATIVE  NEG mg/dL    Glucose NEGATIVE  NEG mg/dL    Ketone NEGATIVE  NEG mg/dL    Bilirubin NEGATIVE  NEG      Blood NEGATIVE  NEG      Urobilinogen 1.0 0.2 - 1.0 EU/dL    Nitrites NEGATIVE  NEG      Leukocyte Esterase NEGATIVE  NEG     EKG, 12 LEAD, INITIAL   Result Value Ref Range    Ventricular Rate 95 BPM    Atrial Rate 95 BPM    P-R Interval 154 ms    QRS Duration 86 ms    Q-T Interval 348 ms    QTC Calculation (Bezet) 437 ms    Calculated P Axis 44 degrees    Calculated R Axis 3 degrees    Calculated T Axis 32 degrees    Diagnosis       Normal sinus rhythm  Normal ECG  Confirmed by Marcus Keita MD, Daja Concepcion ([id number]) on 9/4/2018 10:23:03 PM       Patient Active Problem List   Diagnosis Code    Colon polyp 8/05 K63.5    Anxiety F41.9    Dyslipidemia E78.5    Primary hypertension I10    S/P gastric bypass 3/10 Dr Reyes Flaming Z98.84    Morbid obesity with BMI of 40.0-44.9, adult ([facility] E66.01, Z68.41    [facility] of [facility] F33.1    Attention deficit hyperactivity disorder (ADHD), predominantly inattentive type F90.0     Assessment and plan:  1. Obesity. Intermittent fasting discussed at length. Explained the concepts in detail.   Went over possible physiologic changes that could occur and how that would possibly impact his situation in a positive way.  Discussed 16:8 program in particular. We also went over the differences between hunger and eb hypoglycemia. Look up low insulin index foods. He will do some more research and consider implementing in the near future, standard handout given  2. Colon polyp. Fiber, Dr Gustavo Webster said f/u age 50  4. Anxiety. Continue current  4. ADD. Continue adderall for now  5. Dyslipidemia. Lifestyle and dietary measures as above  6. Hypertension. Continue current regimen. 7. Ortho. He is felt to be average risk for the planned procedure, no contraindications noted. Routine postop prophylaxis per protocol. RTC 3/19    Above conditions discussed at length and patient vocalized understanding.   All questions answered to patient satisfaction        TOTAL time 40 min spent of which at least 30 min was on counseling, answering questions and coordination of care

## 2018-09-17 ENCOUNTER — OFFICE VISIT (OUTPATIENT)
Dept: INTERNAL MEDICINE CLINIC | Age: 38
End: 2018-09-17

## 2018-09-17 VITALS
WEIGHT: 299 LBS | HEIGHT: 68 IN | TEMPERATURE: 98.4 F | DIASTOLIC BLOOD PRESSURE: 82 MMHG | RESPIRATION RATE: 14 BRPM | OXYGEN SATURATION: 97 % | HEART RATE: 95 BPM | BODY MASS INDEX: 45.31 KG/M2 | SYSTOLIC BLOOD PRESSURE: 108 MMHG

## 2018-09-17 DIAGNOSIS — Z98.84 S/P GASTRIC BYPASS: ICD-10-CM

## 2018-09-17 DIAGNOSIS — M17.11 PRIMARY OSTEOARTHRITIS OF RIGHT KNEE: ICD-10-CM

## 2018-09-17 DIAGNOSIS — F33.1 MODERATE EPISODE OF RECURRENT MAJOR DEPRESSIVE DISORDER (HCC): ICD-10-CM

## 2018-09-17 DIAGNOSIS — E66.01 MORBID OBESITY WITH BMI OF 40.0-44.9, ADULT (HCC): ICD-10-CM

## 2018-09-17 DIAGNOSIS — E78.5 DYSLIPIDEMIA: ICD-10-CM

## 2018-09-17 DIAGNOSIS — Z01.818 PREOP EXAM FOR INTERNAL MEDICINE: Primary | ICD-10-CM

## 2018-09-17 DIAGNOSIS — I10 PRIMARY HYPERTENSION: ICD-10-CM

## 2018-09-17 NOTE — MR AVS SNAPSHOT
[facility] 
 
 
 [address], [facility] [address] [facility] 
[phone] Patient: Дмитрий Davis MRN: [id number] NRE:[birth year] Visit Information Date & Time Provider Department Dept. Phone Encounter #  
 9/17/2018 11:00 AM Joseph Padilla MD [facility] [id number] Your Appointments 12/17/2018 10:40 AM  
Office Visit with Joseph Padilla MD  
[facility]: follow up on diet progress [address], [facility] Skyler Tejeda [address] [place]  
  
   
 [address], [facility] Upcoming Health Maintenance Date Due DTaP/Tdap/Td series (1 - Tdap) 5/2/1999 Influenza Age 5 to Adult 8/1/2018 COLONOSCOPY 3/28/2027 Allergies as of 9/17/2018  Review Complete On: 9/17/2018 By: Silvio Presley LPN Severity Noted Reaction Type Reactions Lisinopril  06/19/2017    Cough Current Immunizations  Reviewed on 5/21/2015 Name Date Influenza Vaccine 11/23/2013 Influenza Vaccine Split 10/5/2011 Influenza Vaccine Whole 9/12/2012, 11/4/2009 Not reviewed this visit Vitals BP Pulse Temp Resp Height(growth percentile) Weight(growth percentile) 108/82 95 98.4 °F (36.9 °C) (Oral) 14 5' 8\" (1.727 m) 299 lb (135.6 kg) SpO2 BMI Smoking Status 97% 45.46 kg/m2 Former Smoker Vitals History BMI and BSA Data Body Mass Index Body Surface Area 45.46 kg/m 2 2.55 m 2 Preferred Pharmacy Pharmacy Name Phone [facility] [address] [facility], [address] [facility] [phone] Your Updated Medication List  
  
   
This list is accurate as of 9/17/18 12:10 PM.  Always use your most recent med list.  
  
  
  
  
 buPROPion  mg tablet Commonly known as:  Jamie Rangel Take 150 mg by mouth every morning. busPIRone 10 mg tablet Commonly known as:  BUSPAR Take 10 mg by mouth two (2) times a day. cloNIDine HCl 0.1 mg tablet Commonly known as:  CATAPRES Take 0.1 mg by mouth nightly. ibuprofen 800 mg tablet Commonly known as:  MOTRIN Take 800 mg by mouth every eight (8) hours as needed for Pain. LEXAPRO 20 mg tablet Generic drug:  escitalopram oxalate Take 20 mg by mouth daily. losartan 50 mg tablet Commonly known as:  COZAAR Take 50 mg by mouth daily. traZODone 50 mg tablet Commonly known as:  Samuel Fray Take 50 mg by mouth nightly. valACYclovir 500 mg tablet Commonly known as:  VALTREX Take 1 Tab by mouth two (2) times a day. VITAMIN B-12 1,000 mcg sublingual tablet Generic drug:  cyanocobalamin Take 1,000 mcg by mouth daily. Introducing [facility]! Dear Faustina Gregory: 
Thank you for requesting a [facility] account. Our records indicate that you already have an active [facility] account. You can access your account anytime at [email or web address] Did you know that you can access your hospital and ER discharge instructions at any time in [facility]? You can also review all of your test results from your hospital stay or ER visit. Additional Information If you have questions, please visit the Frequently Asked Questions section of the [facility] website at [email or web address] Remember, [facility] is NOT to be used for urgent needs. For medical emergencies, dial 911. Now available from your iPhone and Android! Please provide this summary of care documentation to your next provider. Your primary care clinician is listed as Kevin Keane. If you have any questions after today's visit, please call [phone].

## 2018-09-17 NOTE — PROGRESS NOTES
1. Have you been to the ER, urgent care clinic or hospitalized since your last visit? NO.     2. Have you seen or consulted any other health care providers outside of the [facility] since your last visit (Include any pap smears or colon screening)? NO      Do you have an Advanced Directive? NO    Would you like information on Advanced Directives?  NO    Chief Complaint   Patient presents with    Pre-op Exam     right knee replacement on 10/1/18 with Srinivasan Wyatt

## 2018-09-30 PROBLEM — M17.11 OSTEOARTHRITIS OF RIGHT KNEE: Chronic | Status: ACTIVE | Noted: 2018-09-30

## 2018-09-30 NOTE — H&P
[address] [facility] [address] Medicine  History and Physical Exam    Patient: Fernando Benito MRN: [id number]  SSN: [id number]    YOB: 1980  Age: 34 y.o. Sex: male      Subjective:      Chief Complaint: Right knee pain    History of Present Illness:  Patient complains of pain to the right knee and difficulty ambulating, which has progressively worsened over several months. X-rays showed osteoarthritis of the joint, primarily localized to the medial compartment. The patient's pain has persisted and progressed despite conservative treatments and therapies. The patient has been previously treated with NSAIDs. The patient has at this time opted for surgical intervention.        Past Medical History:   Diagnosis Date    ADD  10/11    Allergic rhinitis     Arthritis     Dr Rene Sauceda; right knee    Asthma     as a child    Chronic back pain     Colon polyp     f/u colo age 46 per Dr Valorie Rg from 2014    Depression     FHx: heart disease     GERD (gastroesophageal reflux disease)     Hepatic steatosis     HTN (hypertension) 2010    resolved after gastric bypass    Obesity     s/p gastric bypass 5/10 Dr. Ro Dobbins BMI 54, peak weight 367; elvis 195 lbs; failed qsymia 2017-18; IF 9/18 start weight 299 lbs    FRANCISCA on CPAP     resolved after wt loss    Osteoarthritis of right knee 9/30/2018    Primary hypertension 3/28/2017    Stomach ulcer 2002    Dr. Valorie Rg    Ulcerative proctitis 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 Authorizing Provider   escitalopram oxalate (LEXAPRO) 20 mg tablet Take 20 mg by mouth daily. Historical Provider   losartan (COZAAR) 50 mg tablet Take 50 mg by mouth daily. Historical Provider   buPROPion XL (WELLBUTRIN XL) 150 mg tablet Take 150 mg by mouth every morning. Historical Provider   cyanocobalamin (VITAMIN B-12) 1,000 mcg sublingual tablet Take 1,000 mcg by mouth daily. Historical Provider   ibuprofen (MOTRIN) 800 mg tablet Take 800 mg by mouth every eight (8) hours as needed for Pain. Historical Provider   valACYclovir (VALTREX) 500 mg tablet Take 1 Tab by mouth two (2) times a day. Patient taking differently: Take 500 mg by mouth as needed. 9/11/18   Jaquelin Melendez MD   cloNIDine HCl (CATAPRES) 0.1 mg tablet Take 0.1 mg by mouth nightly. Historical Provider   traZODone (DESYREL) 50 mg tablet Take 50 mg by mouth nightly. Historical Provider   busPIRone (BUSPAR) 10 mg tablet Take 10 mg by mouth two (2) times a day. Historical Provider       Allergies: Allergies   Allergen Reactions    Lisinopril Cough        Review of Systems:  Pertinent items are noted in the History of Present Illness. Objective:       Physical Exam:  HEENT: Normocephalic, atraumatic  Lungs:  Clear to auscultation  Heart:   Regular rate and rhythm  Abdomen: Soft  Extremities:  Pain with range of motion of the right knee. Active extension full, active                        flexion full. Tenderness medial knee. Assessment:      Arthritis of the right knee.     Plan:       Proceed with scheduled RIGHT MEDIAL UNICONDYLAR VS TOTAL KNEE ARTHROPLASTY. The various methods of treatment have been discussed with the patient and family. After consideration of risks, benefits, and other options for treatment, the patient has consented to surgical interventions. Questions were answered and preoperative teaching was done by Dr Alicia Ibrahim.      Signed By: ANIKA Smith     September 30, 2018

## 2018-10-01 ENCOUNTER — APPOINTMENT (OUTPATIENT)
Dept: GENERAL RADIOLOGY | Age: 38
DRG: 470 | End: 2018-10-01
Attending: PHYSICIAN ASSISTANT
Payer: COMMERCIAL

## 2018-10-01 ENCOUNTER — ANESTHESIA EVENT (OUTPATIENT)
Dept: SURGERY | Age: 38
DRG: 470 | End: 2018-10-01
Payer: COMMERCIAL

## 2018-10-01 ENCOUNTER — HOSPITAL ENCOUNTER (INPATIENT)
Age: 38
LOS: 1 days | Discharge: HOME HEALTH | DRG: 470 | End: 2018-10-02
Attending: ORTHOPAEDIC SURGERY | Admitting: ORTHOPAEDIC SURGERY
Payer: COMMERCIAL

## 2018-10-01 ENCOUNTER — ANESTHESIA (OUTPATIENT)
Dept: SURGERY | Age: 38
DRG: 470 | End: 2018-10-01
Payer: COMMERCIAL

## 2018-10-01 DIAGNOSIS — M17.11 PRIMARY OSTEOARTHRITIS OF RIGHT KNEE: Primary | Chronic | ICD-10-CM

## 2018-10-01 LAB
ABO + RH BLD: NORMAL
BLOOD GROUP ANTIBODIES SERPL: NORMAL
GLUCOSE BLD STRIP.AUTO-MCNC: 93 MG/DL (ref 70–110)
SPECIMEN EXP DATE BLD: NORMAL

## 2018-10-01 PROCEDURE — 74011000250 HC RX REV CODE- 250: Performed by: ORTHOPAEDIC SURGERY

## 2018-10-01 PROCEDURE — 65270000029 HC RM PRIVATE

## 2018-10-01 PROCEDURE — 82962 GLUCOSE BLOOD TEST: CPT

## 2018-10-01 PROCEDURE — 77030031139 HC SUT VCRL2 J&J -A: Performed by: ORTHOPAEDIC SURGERY

## 2018-10-01 PROCEDURE — 77030011264 HC ELECTRD BLD EXT COVD -A: Performed by: ORTHOPAEDIC SURGERY

## 2018-10-01 PROCEDURE — 77030016060 HC NDL NRV BLK TELE -A: Performed by: ANESTHESIOLOGY

## 2018-10-01 PROCEDURE — 74011250636 HC RX REV CODE- 250/636

## 2018-10-01 PROCEDURE — 74011000250 HC RX REV CODE- 250

## 2018-10-01 PROCEDURE — 74011250637 HC RX REV CODE- 250/637: Performed by: ANESTHESIOLOGY

## 2018-10-01 PROCEDURE — 77030013708 HC HNDPC SUC IRR PULS STRY –B: Performed by: ORTHOPAEDIC SURGERY

## 2018-10-01 PROCEDURE — 77030037875 HC DRSG MEPILEX <16IN BORD MOLN -A: Performed by: ORTHOPAEDIC SURGERY

## 2018-10-01 PROCEDURE — 77030020274 HC MISC IMPL ORTHOPEDIC: Performed by: ORTHOPAEDIC SURGERY

## 2018-10-01 PROCEDURE — 76210000016 HC OR PH I REC 1 TO 1.5 HR: Performed by: ORTHOPAEDIC SURGERY

## 2018-10-01 PROCEDURE — 3E0T3BZ INTRODUCTION OF ANESTHETIC AGENT INTO PERIPHERAL NERVES AND PLEXI, PERCUTANEOUS APPROACH: ICD-10-PCS | Performed by: ANESTHESIOLOGY

## 2018-10-01 PROCEDURE — 0SRC0J9 REPLACEMENT OF RIGHT KNEE JOINT WITH SYNTHETIC SUBSTITUTE, CEMENTED, OPEN APPROACH: ICD-10-PCS | Performed by: ORTHOPAEDIC SURGERY

## 2018-10-01 PROCEDURE — 74011250636 HC RX REV CODE- 250/636: Performed by: PHYSICIAN ASSISTANT

## 2018-10-01 PROCEDURE — 74011000258 HC RX REV CODE- 258: Performed by: ORTHOPAEDIC SURGERY

## 2018-10-01 PROCEDURE — 77030011628: Performed by: ORTHOPAEDIC SURGERY

## 2018-10-01 PROCEDURE — 76942 ECHO GUIDE FOR BIOPSY: CPT | Performed by: ORTHOPAEDIC SURGERY

## 2018-10-01 PROCEDURE — 77030034694 HC SCPL CANADY PLSM DISP USMD -E: Performed by: ORTHOPAEDIC SURGERY

## 2018-10-01 PROCEDURE — 86900 BLOOD TYPING SEROLOGIC ABO: CPT | Performed by: ORTHOPAEDIC SURGERY

## 2018-10-01 PROCEDURE — 64447 NJX AA&/STRD FEMORAL NRV IMG: CPT | Performed by: ANESTHESIOLOGY

## 2018-10-01 PROCEDURE — 77030034479 HC ADH SKN CLSR PRINEO J&J -B: Performed by: ORTHOPAEDIC SURGERY

## 2018-10-01 PROCEDURE — 97161 PT EVAL LOW COMPLEX 20 MIN: CPT

## 2018-10-01 PROCEDURE — 77030003666 HC NDL SPINAL BD -A: Performed by: ORTHOPAEDIC SURGERY

## 2018-10-01 PROCEDURE — 77030012508 HC MSK AIRWY LMA AMBU -A: Performed by: ANESTHESIOLOGY

## 2018-10-01 PROCEDURE — 74011250637 HC RX REV CODE- 250/637: Performed by: ORTHOPAEDIC SURGERY

## 2018-10-01 PROCEDURE — 74011250637 HC RX REV CODE- 250/637: Performed by: PHYSICIAN ASSISTANT

## 2018-10-01 PROCEDURE — 77030038010: Performed by: ORTHOPAEDIC SURGERY

## 2018-10-01 PROCEDURE — 74011250636 HC RX REV CODE- 250/636: Performed by: ORTHOPAEDIC SURGERY

## 2018-10-01 PROCEDURE — 77010033678 HC OXYGEN DAILY

## 2018-10-01 PROCEDURE — 77030036563 HC WRP CLD THER KNE S2SG -B: Performed by: ORTHOPAEDIC SURGERY

## 2018-10-01 PROCEDURE — 77030018836 HC SOL IRR NACL ICUM -A: Performed by: ORTHOPAEDIC SURGERY

## 2018-10-01 PROCEDURE — 77030006802 HC BLD SAW RECIP BRSM -B: Performed by: ORTHOPAEDIC SURGERY

## 2018-10-01 PROCEDURE — C1713 ANCHOR/SCREW BN/BN,TIS/BN: HCPCS | Performed by: ORTHOPAEDIC SURGERY

## 2018-10-01 PROCEDURE — 77030012893: Performed by: ORTHOPAEDIC SURGERY

## 2018-10-01 PROCEDURE — 77030032490 HC SLV COMPR SCD KNE COVD -B: Performed by: ORTHOPAEDIC SURGERY

## 2018-10-01 PROCEDURE — 77030020782 HC GWN BAIR PAWS FLX 3M -B: Performed by: ORTHOPAEDIC SURGERY

## 2018-10-01 PROCEDURE — 76060000034 HC ANESTHESIA 1.5 TO 2 HR: Performed by: ORTHOPAEDIC SURGERY

## 2018-10-01 PROCEDURE — 77030002934 HC SUT MCRYL J&J -B: Performed by: ORTHOPAEDIC SURGERY

## 2018-10-01 PROCEDURE — 77030032489 HC SLV COMPR SCD FT CUF COVD -B: Performed by: ORTHOPAEDIC SURGERY

## 2018-10-01 PROCEDURE — 36415 COLL VENOUS BLD VENIPUNCTURE: CPT | Performed by: ORTHOPAEDIC SURGERY

## 2018-10-01 PROCEDURE — 76010000153 HC OR TIME 1.5 TO 2 HR: Performed by: ORTHOPAEDIC SURGERY

## 2018-10-01 PROCEDURE — C9290 INJ, BUPIVACAINE LIPOSOME: HCPCS | Performed by: ORTHOPAEDIC SURGERY

## 2018-10-01 PROCEDURE — 77030027138 HC INCENT SPIROMETER -A: Performed by: ORTHOPAEDIC SURGERY

## 2018-10-01 PROCEDURE — 97116 GAIT TRAINING THERAPY: CPT

## 2018-10-01 PROCEDURE — 73560 X-RAY EXAM OF KNEE 1 OR 2: CPT

## 2018-10-01 DEVICE — ANATOMICAL FEMORAL COMPONENT CEMENTED S6 RM
Type: IMPLANTABLE DEVICE | Site: KNEE | Status: FUNCTIONAL
Brand: MOTO PARTIAL KNEE SYSTEM - MEDIAL

## 2018-10-01 DEVICE — CEMENT BNE 40GM FULL DOSE PMMA W/O ANTIBIO HI VISC N RADPQ: Type: IMPLANTABLE DEVICE | Site: KNEE | Status: FUNCTIONAL

## 2018-10-01 RX ORDER — BUPROPION HYDROCHLORIDE 150 MG/1
150 TABLET ORAL
Status: DISCONTINUED | OUTPATIENT
Start: 2018-10-02 | End: 2018-10-02 | Stop reason: HOSPADM

## 2018-10-01 RX ORDER — PANTOPRAZOLE SODIUM 40 MG/1
40 TABLET, DELAYED RELEASE ORAL DAILY
Status: DISCONTINUED | OUTPATIENT
Start: 2018-10-01 | End: 2018-10-02 | Stop reason: HOSPADM

## 2018-10-01 RX ORDER — OXYCODONE AND ACETAMINOPHEN 5; 325 MG/1; MG/1
TABLET ORAL
Qty: 60 TAB | Refills: 0 | Status: SHIPPED | OUTPATIENT
Start: 2018-10-01 | End: 2019-01-07

## 2018-10-01 RX ORDER — ROPIVACAINE HYDROCHLORIDE 5 MG/ML
INJECTION, SOLUTION EPIDURAL; INFILTRATION; PERINEURAL AS NEEDED
Status: DISCONTINUED | OUTPATIENT
Start: 2018-10-01 | End: 2018-10-01 | Stop reason: HOSPADM

## 2018-10-01 RX ORDER — DEXMEDETOMIDINE HYDROCHLORIDE 4 UG/ML
INJECTION, SOLUTION INTRAVENOUS AS NEEDED
Status: DISCONTINUED | OUTPATIENT
Start: 2018-10-01 | End: 2018-10-01 | Stop reason: HOSPADM

## 2018-10-01 RX ORDER — SODIUM CHLORIDE 0.9 % (FLUSH) 0.9 %
5-10 SYRINGE (ML) INJECTION AS NEEDED
Status: DISCONTINUED | OUTPATIENT
Start: 2018-10-01 | End: 2018-10-02 | Stop reason: HOSPADM

## 2018-10-01 RX ORDER — LANOLIN ALCOHOL/MO/W.PET/CERES
1000 CREAM (GRAM) TOPICAL DAILY
Status: DISCONTINUED | OUTPATIENT
Start: 2018-10-02 | End: 2018-10-02 | Stop reason: HOSPADM

## 2018-10-01 RX ORDER — DOCUSATE SODIUM 100 MG/1
100 CAPSULE, LIQUID FILLED ORAL 2 TIMES DAILY
Status: DISCONTINUED | OUTPATIENT
Start: 2018-10-01 | End: 2018-10-02 | Stop reason: HOSPADM

## 2018-10-01 RX ORDER — DIPHENHYDRAMINE HCL 25 MG
25 CAPSULE ORAL
Status: DISCONTINUED | OUTPATIENT
Start: 2018-10-01 | End: 2018-10-02 | Stop reason: HOSPADM

## 2018-10-01 RX ORDER — DIPHENHYDRAMINE HYDROCHLORIDE 50 MG/ML
12.5 INJECTION, SOLUTION INTRAMUSCULAR; INTRAVENOUS
Status: DISCONTINUED | OUTPATIENT
Start: 2018-10-01 | End: 2018-10-02 | Stop reason: HOSPADM

## 2018-10-01 RX ORDER — SODIUM CHLORIDE, SODIUM LACTATE, POTASSIUM CHLORIDE, CALCIUM CHLORIDE 600; 310; 30; 20 MG/100ML; MG/100ML; MG/100ML; MG/100ML
100 INJECTION, SOLUTION INTRAVENOUS CONTINUOUS
Status: DISCONTINUED | OUTPATIENT
Start: 2018-10-01 | End: 2018-10-01 | Stop reason: HOSPADM

## 2018-10-01 RX ORDER — SODIUM CHLORIDE 9 MG/ML
300 INJECTION, SOLUTION INTRAVENOUS CONTINUOUS
Status: DISPENSED | OUTPATIENT
Start: 2018-10-01 | End: 2018-10-01

## 2018-10-01 RX ORDER — PANTOPRAZOLE SODIUM 40 MG/1
40 TABLET, DELAYED RELEASE ORAL DAILY
Status: DISCONTINUED | OUTPATIENT
Start: 2018-10-01 | End: 2018-10-01

## 2018-10-01 RX ORDER — DEXAMETHASONE SODIUM PHOSPHATE 4 MG/ML
8 INJECTION, SOLUTION INTRA-ARTICULAR; INTRALESIONAL; INTRAMUSCULAR; INTRAVENOUS; SOFT TISSUE ONCE
Status: DISCONTINUED | OUTPATIENT
Start: 2018-10-01 | End: 2018-10-01

## 2018-10-01 RX ORDER — OXYCODONE HYDROCHLORIDE 5 MG/1
5-10 TABLET ORAL
Status: DISCONTINUED | OUTPATIENT
Start: 2018-10-01 | End: 2018-10-02 | Stop reason: HOSPADM

## 2018-10-01 RX ORDER — ZOLPIDEM TARTRATE 5 MG/1
5-10 TABLET ORAL
Status: DISCONTINUED | OUTPATIENT
Start: 2018-10-01 | End: 2018-10-02 | Stop reason: HOSPADM

## 2018-10-01 RX ORDER — SODIUM CHLORIDE, SODIUM LACTATE, POTASSIUM CHLORIDE, CALCIUM CHLORIDE 600; 310; 30; 20 MG/100ML; MG/100ML; MG/100ML; MG/100ML
125 INJECTION, SOLUTION INTRAVENOUS CONTINUOUS
Status: DISCONTINUED | OUTPATIENT
Start: 2018-10-01 | End: 2018-10-02 | Stop reason: HOSPADM

## 2018-10-01 RX ORDER — TRAZODONE HYDROCHLORIDE 50 MG/1
50 TABLET ORAL
Status: DISCONTINUED | OUTPATIENT
Start: 2018-10-01 | End: 2018-10-02 | Stop reason: HOSPADM

## 2018-10-01 RX ORDER — ACETAMINOPHEN 500 MG
1000 TABLET ORAL ONCE
Status: COMPLETED | OUTPATIENT
Start: 2018-10-01 | End: 2018-10-01

## 2018-10-01 RX ORDER — NALOXONE HYDROCHLORIDE 0.4 MG/ML
0.4 INJECTION, SOLUTION INTRAMUSCULAR; INTRAVENOUS; SUBCUTANEOUS AS NEEDED
Status: DISCONTINUED | OUTPATIENT
Start: 2018-10-01 | End: 2018-10-01 | Stop reason: HOSPADM

## 2018-10-01 RX ORDER — FENTANYL CITRATE 50 UG/ML
INJECTION, SOLUTION INTRAMUSCULAR; INTRAVENOUS AS NEEDED
Status: DISCONTINUED | OUTPATIENT
Start: 2018-10-01 | End: 2018-10-01 | Stop reason: HOSPADM

## 2018-10-01 RX ORDER — MIDAZOLAM HYDROCHLORIDE 1 MG/ML
INJECTION, SOLUTION INTRAMUSCULAR; INTRAVENOUS AS NEEDED
Status: DISCONTINUED | OUTPATIENT
Start: 2018-10-01 | End: 2018-10-01 | Stop reason: HOSPADM

## 2018-10-01 RX ORDER — ESCITALOPRAM OXALATE 10 MG/1
20 TABLET ORAL DAILY
Status: DISCONTINUED | OUTPATIENT
Start: 2018-10-02 | End: 2018-10-02 | Stop reason: HOSPADM

## 2018-10-01 RX ORDER — HYDROMORPHONE HYDROCHLORIDE 2 MG/ML
INJECTION, SOLUTION INTRAMUSCULAR; INTRAVENOUS; SUBCUTANEOUS AS NEEDED
Status: DISCONTINUED | OUTPATIENT
Start: 2018-10-01 | End: 2018-10-01 | Stop reason: HOSPADM

## 2018-10-01 RX ORDER — FENTANYL CITRATE 50 UG/ML
50 INJECTION, SOLUTION INTRAMUSCULAR; INTRAVENOUS
Status: DISCONTINUED | OUTPATIENT
Start: 2018-10-01 | End: 2018-10-01 | Stop reason: HOSPADM

## 2018-10-01 RX ORDER — GLYCOPYRROLATE 0.2 MG/ML
INJECTION INTRAMUSCULAR; INTRAVENOUS AS NEEDED
Status: DISCONTINUED | OUTPATIENT
Start: 2018-10-01 | End: 2018-10-01 | Stop reason: HOSPADM

## 2018-10-01 RX ORDER — LIDOCAINE HYDROCHLORIDE 20 MG/ML
INJECTION, SOLUTION EPIDURAL; INFILTRATION; INTRACAUDAL; PERINEURAL AS NEEDED
Status: DISCONTINUED | OUTPATIENT
Start: 2018-10-01 | End: 2018-10-01 | Stop reason: HOSPADM

## 2018-10-01 RX ORDER — CLONIDINE HYDROCHLORIDE 0.1 MG/1
0.1 TABLET ORAL
Status: DISCONTINUED | OUTPATIENT
Start: 2018-10-01 | End: 2018-10-02 | Stop reason: HOSPADM

## 2018-10-01 RX ORDER — FLUMAZENIL 0.1 MG/ML
0.2 INJECTION INTRAVENOUS
Status: DISCONTINUED | OUTPATIENT
Start: 2018-10-01 | End: 2018-10-01 | Stop reason: HOSPADM

## 2018-10-01 RX ORDER — LOSARTAN POTASSIUM 50 MG/1
50 TABLET ORAL DAILY
Status: DISCONTINUED | OUTPATIENT
Start: 2018-10-02 | End: 2018-10-02 | Stop reason: HOSPADM

## 2018-10-01 RX ORDER — TRANEXAMIC ACID 650 1/1
1950 TABLET ORAL ONCE
Status: COMPLETED | OUTPATIENT
Start: 2018-10-01 | End: 2018-10-01

## 2018-10-01 RX ORDER — CELECOXIB 100 MG/1
400 CAPSULE ORAL
Status: COMPLETED | OUTPATIENT
Start: 2018-10-01 | End: 2018-10-01

## 2018-10-01 RX ORDER — DEXAMETHASONE SODIUM PHOSPHATE 4 MG/ML
4 INJECTION, SOLUTION INTRA-ARTICULAR; INTRALESIONAL; INTRAMUSCULAR; INTRAVENOUS; SOFT TISSUE ONCE
Status: COMPLETED | OUTPATIENT
Start: 2018-10-01 | End: 2018-10-01

## 2018-10-01 RX ORDER — ONDANSETRON 2 MG/ML
4 INJECTION INTRAMUSCULAR; INTRAVENOUS
Status: DISCONTINUED | OUTPATIENT
Start: 2018-10-01 | End: 2018-10-02 | Stop reason: HOSPADM

## 2018-10-01 RX ORDER — SODIUM CHLORIDE 0.9 % (FLUSH) 0.9 %
5-10 SYRINGE (ML) INJECTION EVERY 8 HOURS
Status: DISCONTINUED | OUTPATIENT
Start: 2018-10-01 | End: 2018-10-02 | Stop reason: HOSPADM

## 2018-10-01 RX ORDER — PREGABALIN 50 MG/1
50 CAPSULE ORAL
Status: COMPLETED | OUTPATIENT
Start: 2018-10-01 | End: 2018-10-01

## 2018-10-01 RX ORDER — SODIUM CHLORIDE 9 MG/ML
125 INJECTION, SOLUTION INTRAVENOUS CONTINUOUS
Status: DISPENSED | OUTPATIENT
Start: 2018-10-01 | End: 2018-10-02

## 2018-10-01 RX ORDER — LANOLIN ALCOHOL/MO/W.PET/CERES
1 CREAM (GRAM) TOPICAL 3 TIMES DAILY
Status: DISCONTINUED | OUTPATIENT
Start: 2018-10-01 | End: 2018-10-02 | Stop reason: HOSPADM

## 2018-10-01 RX ORDER — PROPOFOL 10 MG/ML
INJECTION, EMULSION INTRAVENOUS AS NEEDED
Status: DISCONTINUED | OUTPATIENT
Start: 2018-10-01 | End: 2018-10-01 | Stop reason: HOSPADM

## 2018-10-01 RX ORDER — BUSPIRONE HYDROCHLORIDE 5 MG/1
10 TABLET ORAL 2 TIMES DAILY
Status: DISCONTINUED | OUTPATIENT
Start: 2018-10-01 | End: 2018-10-02 | Stop reason: HOSPADM

## 2018-10-01 RX ORDER — ONDANSETRON 2 MG/ML
INJECTION INTRAMUSCULAR; INTRAVENOUS AS NEEDED
Status: DISCONTINUED | OUTPATIENT
Start: 2018-10-01 | End: 2018-10-01 | Stop reason: HOSPADM

## 2018-10-01 RX ORDER — CEFADROXIL 500 MG/1
500 CAPSULE ORAL 2 TIMES DAILY
Qty: 10 CAP | Refills: 0 | Status: SHIPPED | OUTPATIENT
Start: 2018-10-01 | End: 2018-10-06

## 2018-10-01 RX ORDER — ONDANSETRON 2 MG/ML
4 INJECTION INTRAMUSCULAR; INTRAVENOUS ONCE
Status: DISCONTINUED | OUTPATIENT
Start: 2018-10-01 | End: 2018-10-01 | Stop reason: HOSPADM

## 2018-10-01 RX ORDER — KETOROLAC TROMETHAMINE 30 MG/ML
INJECTION, SOLUTION INTRAMUSCULAR; INTRAVENOUS AS NEEDED
Status: DISCONTINUED | OUTPATIENT
Start: 2018-10-01 | End: 2018-10-01 | Stop reason: HOSPADM

## 2018-10-01 RX ORDER — NALOXONE HYDROCHLORIDE 0.4 MG/ML
0.4 INJECTION, SOLUTION INTRAMUSCULAR; INTRAVENOUS; SUBCUTANEOUS AS NEEDED
Status: DISCONTINUED | OUTPATIENT
Start: 2018-10-01 | End: 2018-10-02 | Stop reason: HOSPADM

## 2018-10-01 RX ADMIN — SODIUM CHLORIDE, SODIUM LACTATE, POTASSIUM CHLORIDE, AND CALCIUM CHLORIDE 1000 ML: 600; 310; 30; 20 INJECTION, SOLUTION INTRAVENOUS at 11:04

## 2018-10-01 RX ADMIN — ACETAMINOPHEN 1000 MG: 500 TABLET, FILM COATED ORAL at 11:11

## 2018-10-01 RX ADMIN — TRANEXAMIC ACID: 650 TABLET ORAL at 11:10

## 2018-10-01 RX ADMIN — PROPOFOL 200 MG: 10 INJECTION, EMULSION INTRAVENOUS at 12:30

## 2018-10-01 RX ADMIN — ROPIVACAINE HYDROCHLORIDE 30 ML: 5 INJECTION, SOLUTION EPIDURAL; INFILTRATION; PERINEURAL at 12:04

## 2018-10-01 RX ADMIN — SODIUM CHLORIDE 125 ML/HR: 900 INJECTION, SOLUTION INTRAVENOUS at 18:56

## 2018-10-01 RX ADMIN — SODIUM CHLORIDE 300 ML/HR: 900 INJECTION, SOLUTION INTRAVENOUS at 17:00

## 2018-10-01 RX ADMIN — SODIUM CHLORIDE, SODIUM LACTATE, POTASSIUM CHLORIDE, AND CALCIUM CHLORIDE 125 ML/HR: 600; 310; 30; 20 INJECTION, SOLUTION INTRAVENOUS at 11:04

## 2018-10-01 RX ADMIN — PREGABALIN 50 MG: 50 CAPSULE ORAL at 11:11

## 2018-10-01 RX ADMIN — GLYCOPYRROLATE 0.2 MG: 0.2 INJECTION INTRAMUSCULAR; INTRAVENOUS at 12:21

## 2018-10-01 RX ADMIN — LIDOCAINE HYDROCHLORIDE 80 MG: 20 INJECTION, SOLUTION EPIDURAL; INFILTRATION; INTRACAUDAL; PERINEURAL at 12:30

## 2018-10-01 RX ADMIN — PANTOPRAZOLE SODIUM 40 MG: 40 TABLET, DELAYED RELEASE ORAL at 11:11

## 2018-10-01 RX ADMIN — ONDANSETRON 4 MG: 2 INJECTION INTRAMUSCULAR; INTRAVENOUS at 12:32

## 2018-10-01 RX ADMIN — TRAZODONE HYDROCHLORIDE 50 MG: 50 TABLET ORAL at 21:23

## 2018-10-01 RX ADMIN — CLONIDINE HYDROCHLORIDE 0.1 MG: 0.1 TABLET ORAL at 21:23

## 2018-10-01 RX ADMIN — MIDAZOLAM HYDROCHLORIDE 2 MG: 1 INJECTION, SOLUTION INTRAMUSCULAR; INTRAVENOUS at 12:21

## 2018-10-01 RX ADMIN — MIDAZOLAM HYDROCHLORIDE 2 MG: 1 INJECTION, SOLUTION INTRAMUSCULAR; INTRAVENOUS at 12:02

## 2018-10-01 RX ADMIN — KETOROLAC TROMETHAMINE 30 MG: 30 INJECTION, SOLUTION INTRAMUSCULAR; INTRAVENOUS at 13:31

## 2018-10-01 RX ADMIN — BUSPIRONE HYDROCHLORIDE 10 MG: 5 TABLET ORAL at 21:23

## 2018-10-01 RX ADMIN — CELECOXIB 400 MG: 100 CAPSULE ORAL at 11:11

## 2018-10-01 RX ADMIN — DEXAMETHASONE SODIUM PHOSPHATE 4 MG: 4 INJECTION, SOLUTION INTRAMUSCULAR; INTRAVENOUS at 11:11

## 2018-10-01 RX ADMIN — APIXABAN 2.5 MG: 2.5 TABLET, FILM COATED ORAL at 21:23

## 2018-10-01 RX ADMIN — HYDROMORPHONE HYDROCHLORIDE 2 MG: 2 INJECTION, SOLUTION INTRAMUSCULAR; INTRAVENOUS; SUBCUTANEOUS at 12:30

## 2018-10-01 RX ADMIN — DEXMEDETOMIDINE HYDROCHLORIDE 16 MCG: 4 INJECTION, SOLUTION INTRAVENOUS at 12:32

## 2018-10-01 RX ADMIN — FERROUS SULFATE TAB 325 MG (65 MG ELEMENTAL FE) 325 MG: 325 (65 FE) TAB at 18:55

## 2018-10-01 RX ADMIN — DOCUSATE SODIUM 100 MG: 100 CAPSULE, LIQUID FILLED ORAL at 21:23

## 2018-10-01 RX ADMIN — FERROUS SULFATE TAB 325 MG (65 MG ELEMENTAL FE) 325 MG: 325 (65 FE) TAB at 21:23

## 2018-10-01 RX ADMIN — Medication 3 G: at 12:24

## 2018-10-01 RX ADMIN — CEFAZOLIN SODIUM 3 G: 10 INJECTION, POWDER, FOR SOLUTION INTRAVENOUS at 21:22

## 2018-10-01 RX ADMIN — FENTANYL CITRATE 100 MCG: 50 INJECTION, SOLUTION INTRAMUSCULAR; INTRAVENOUS at 12:02

## 2018-10-01 NOTE — PERIOP NOTES
Patient transferred to room 213, [facility], 2 south via bed. NC at 2 LPM. PIV with IVF Infusing per orders. Blood pressure 128/74, pulse 94, temperature 98.3 °F (36.8 °C), resp. rate 14, height 5' 8\" (1.727 m), weight 135.2 kg (298 lb), SpO2 97 %. Dimitris RN at bedside. Wife at bedside.

## 2018-10-01 NOTE — ANESTHESIA POSTPROCEDURE EVALUATION
Post-Anesthesia Evaluation and Assessment    Patient: Shaun Phelps MRN: [id number]  SSN: [id number]    YOB: 1980  Age: 34 y.o. Sex: male       Cardiovascular Function/Vital Signs  Visit Vitals    /64    Pulse 94    Temp 36.9 °C (98.5 °F)    Resp 12    Ht 5' 8\" (1.727 m)    Wt 135.2 kg (298 lb)    SpO2 97%    BMI 45.31 kg/m2       Patient is status post general, regional anesthesia for Procedure(s):  RIGHT KNEE MEDIAL UNICONDYLAR KNEE REPLACEMENT VERSUS TOTAL KNEE REPLACEMENT. Nausea/Vomiting: None    Postoperative hydration reviewed and adequate. Pain:  Pain Scale 1: Numeric (0 - 10) (10/01/18 1500)  Pain Intensity 1: 0 (10/01/18 1500)   Managed    Neurological Status:   Neuro (WDL): Exceptions to WDL (10/01/18 1500)  Neuro  Neurologic State: Alert (10/01/18 1505)  LUE Motor Response: Purposeful (10/01/18 1500)  LLE Motor Response: Purposeful (10/01/18 1500)  RUE Motor Response: Purposeful (10/01/18 1500)  RLE Motor Response: Purposeful;Pharmacologically paralyzed (nerve block) (10/01/18 1500)   At baseline    Mental Status and Level of Consciousness: Alert and oriented     Pulmonary Status:   O2 Device: Nasal cannula (10/01/18 1454)   Adequate oxygenation and airway patent    Complications related to anesthesia: None    Post-anesthesia assessment completed.  No concerns    Signed By: Mabel Mcneal CRNA     October 1, 2018

## 2018-10-01 NOTE — ANESTHESIA PREPROCEDURE EVALUATION
Anesthetic History   No history of anesthetic complications            Review of Systems / Medical History  Patient summary reviewed, nursing notes reviewed and pertinent labs reviewed    Pulmonary        Sleep apnea  Smoker  Asthma : well controlled  Pertinent negatives: No recent URI     Neuro/Psych         Psychiatric history  Pertinent negatives: No seizures, neuromuscular disease, TIA and CVA   Cardiovascular    Hypertension: well controlled            Pertinent negatives: No past MI, CAD, PAD, dysrhythmias, angina and CHF  Exercise tolerance: >4 METS     GI/Hepatic/Renal     GERD: well controlled      PUD and liver disease  Pertinent negatives: No hepatitis and renal disease  Comments: Fatty liver Endo/Other        Morbid obesity and arthritis  Pertinent negatives: No diabetes, hypothyroidism, hyperthyroidism and blood dyscrasia   Other Findings              Physical Exam    Airway  Mallampati: III  TM Distance: 4 - 6 cm  Neck ROM: normal range of motion   Mouth opening: Normal     Cardiovascular  Regular rate and rhythm,  S1 and S2 normal,  no murmur, click, rub, or gallop             Dental  No notable dental hx       Pulmonary  Breath sounds clear to auscultation               Abdominal  GI exam deferred       Other Findings            Anesthetic Plan    ASA: 3  Anesthesia type: general and regional - femoral single shot          Induction: Intravenous  Anesthetic plan and risks discussed with: Patient and Family      GA/LMA with single shot adductor canal block for postop pain control

## 2018-10-01 NOTE — PERIOP NOTES
Pt. Used restroom in pre-op area with assistance. Patient placed on Kayden Paws for a minimum of 30 min in  Preop.

## 2018-10-01 NOTE — PERIOP NOTES
TRANSFER - IN REPORT:    Verbal report received from S. Shreya Sena and PIEDAD Salgado RN(name) on Danie Dukes  being received from OR(unit) for routine post - op      Report consisted of patients Situation, Background, Assessment and   Recommendations(SBAR). Information from the following report(s) SBAR, OR Summary, Procedure Summary, Intake/Output and MAR was reviewed with the receiving nurse. Opportunity for questions and clarification was provided. Assessment completed upon patients arrival to unit and care assumed.

## 2018-10-01 NOTE — PERIOP NOTES
TRANSFER - OUT REPORT:    Verbal report given to MICHELL Salazar RN(name) on Richard Espitia  being transferred to [address] [place] [address], room 213(unit) for routine progression of care       Report consisted of patients Situation, Background, Assessment and   Recommendations(SBAR). Information from the following report(s) SBAR, OR Summary, Procedure Summary, Intake/Output and MAR was reviewed with the receiving nurse. Lines:   Peripheral IV 10/01/18 Left Forearm (Active)   Site Assessment Clean, dry, & intact 10/1/2018  3:00 PM   Phlebitis Assessment 0 10/1/2018  3:00 PM   Infiltration Assessment 0 10/1/2018  3:00 PM   Dressing Status Clean, dry, & intact 10/1/2018  3:00 PM   Dressing Type Transparent;Tape 10/1/2018  3:00 PM   Hub Color/Line Status Green; Infusing;Patent 10/1/2018  3:00 PM        Opportunity for questions and clarification was provided.       Patient transported with:   O2 @ 2 liters  Registered Nurse  [facility]

## 2018-10-01 NOTE — DISCHARGE SUMMARY
[address] [facility] [address]   [address] [facility] [place] [address]     DISCHARGE SUMMARY     PATIENT: Kelle Ramírez     MRN: [id number]   ADMIT DATE: 10/1/2018   BILLIN   DISCHARGE DATE:      ATTENDING: Oanh Henriquez MD   DICTATING: ANIKA Curry         ADMISSION DIAGNOSIS: UNILATERAL PRIMARY OSTEOARTHRITIS, RIGHT KNEE  Osteoarthritis of right knee    DISCHARGE DIAGNOSIS: Status post RIGHT MEDIAL UNICONDYLAR KNEE ARTHROPLASTY    HISTORY OF PRESENT ILLNESS: The patient is a 34y.o. year-old male   with ongoing right knee pain secondary to osteoarthritis of his right knee. The patient's pain has persisted and progressed despite conservative treatments and therapies. The patient has at this time opted for surgical intervention.     PAST MEDICAL HISTORY:   Past Medical History:   Diagnosis Date    ADD  10/11    Allergic rhinitis     Arthritis     Dr Chula Rojas; right knee    Asthma     as a child    Chronic back pain     Colon polyp     f/u colo age 46 per Dr Avila Ramos from     Depression     FHx: heart disease     GERD (gastroesophageal reflux disease)     Hepatic steatosis     HTN (hypertension)     resolved after gastric bypass    Obesity     s/p gastric bypass 5/10 Dr. Keny Ying BMI 54, peak weight 367; elvis 195 lbs; failed qsymia ; IF  start weight 299 lbs    FRANCISCA on CPAP     resolved after wt loss    Osteoarthritis of right knee 2018    Primary hypertension 3/28/2017    Stomach ulcer     Dr. Avila Ramos    Ulcerative proctitis [facility])        PAST SURGICAL HISTORY:   Past Surgical History:   Procedure Laterality Date    BIOPSY LIVER      Dr Ashia Smith  2017    echo nl lv, ef 60%, mild aortic root dilation    HX BUNIONECTOMY      Dr Dwan Simmonds; saw [id number] then Dr [employer] later    HX CHOLECYSTECTOMY      Dr Mitch Moreno       negative, last  proctitis Dr Aline Schaeffer BYPASS  3/10    Lap Cherri-en-Y BMI 54 Dr Anna Pereira preop weight 367 lbs    HX ORTHOPAEDIC Left     left hip Dr Roc Flores age 13       ALLERGIES:   Allergies   Allergen Reactions    Lisinopril Cough        CURRENT MEDICATIONS:  A list of medications prior to the time of admission include:  Prior to Admission medications    Medication Sig Start Date End Date Taking? Authorizing Provider   ALBUTEROL IN Take  by inhalation as needed. Yes Historical Provider   apixaban (ELIQUIS) 2.5 mg tablet Take 1 Tab by mouth two (2) times a day for 14 days. 10/1/18 10/15/18 Yes ANIKA Tomlinson   oxyCODONE-acetaminophen (PERCOCET) 5-325 mg per tablet Take 1-2 tablets by mouth every 4-6 hours as needed for pain. 10/1/18  Yes ANIKA Tomlinson   cefadroxil (DURICEF) 500 mg capsule Take 1 Cap by mouth two (2) times a day for 5 days. 10/1/18 10/6/18 Yes ANIKA Tomlinson   escitalopram oxalate (LEXAPRO) 20 mg tablet Take 20 mg by mouth daily. Yes Historical Provider   losartan (COZAAR) 50 mg tablet Take 50 mg by mouth daily. Yes Historical Provider   buPROPion XL (WELLBUTRIN XL) 150 mg tablet Take 150 mg by mouth every morning. Yes Historical Provider   cyanocobalamin (VITAMIN B-12) 1,000 mcg sublingual tablet Take 1,000 mcg by mouth daily. Yes Historical Provider   ibuprofen (MOTRIN) 800 mg tablet Take 800 mg by mouth every eight (8) hours as needed for Pain. Yes Historical Provider   valACYclovir (VALTREX) 500 mg tablet Take 1 Tab by mouth two (2) times a day. Patient taking differently: Take 500 mg by mouth as needed. 9/11/18  Yes Stephani Cárdenas MD   cloNIDine HCl (CATAPRES) 0.1 mg tablet Take 0.1 mg by mouth nightly. Yes Historical Provider   traZODone (DESYREL) 50 mg tablet Take 50 mg by mouth nightly. Yes Historical Provider   busPIRone (BUSPAR) 10 mg tablet Take 10 mg by mouth two (2) times a day.    Yes Historical Provider       FAMILY HISTORY:   Family History   Problem Relation Age of Onset    Cancer Maternal Grandmother      colon    Stroke Maternal Grandfather     Diabetes Paternal Grandfather     Heart Disease Paternal Grandfather     Cancer Paternal Grandfather      colon    Arthritis-osteo Mother     Heart Disease Mother     Depression Mother     Hypertension Father     Depression Father     Elevated Lipids Father        SOCIAL HISTORY:   Social History     Social History    Marital status:      Spouse name: N/A    Number of children: 2    Years of education: N/A     Occupational History    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 REVIEW OF SYSTEMS: All review of systems are negative. PHYSICAL EXAMINATION: For a detailed physical exam, please refer to the patient's chart. HOSPITAL COURSE: The patient was taken to surgery the day of admission. he underwent a right medial unicondylar knee replacement. Operative course was benign. Estimated blood loss was approximately 100 cc. The patient was taken to the PACU in stable condition and was later taken to the floor in stable condition. During his hospital stay, the patient progressed well with physical therapy and occupational therapy, adherent to instructions. he had been cleared by physical therapy with stair training. he was placed on Eliquis for DVT prophylaxis. his pain has been well controlled with oral pain medications. his vitals have remained stable. he has also remained hemodynamically stable. The patient has been recommended for discharge home. DISCHARGE INSTRUCTIONS: The patient is to be discharged home.     Current Discharge Medication List      START taking these medications    Details   apixaban (ELIQUIS) 2.5 mg tablet Take 1 Tab by mouth two (2) times a day for 14 days. Qty: 28 Tab, Refills: 0      oxyCODONE-acetaminophen (PERCOCET) 5-325 mg per tablet Take 1-2 tablets by mouth every 4-6 hours as needed for pain. Qty: 60 Tab, Refills: 0    Associated Diagnoses: Primary osteoarthritis of right knee      cefadroxil (DURICEF) 500 mg capsule Take 1 Cap by mouth two (2) times a day for 5 days. Qty: 10 Cap, Refills: 0         CONTINUE these medications which have NOT CHANGED    Details   ALBUTEROL IN Take  by inhalation as needed. escitalopram oxalate (LEXAPRO) 20 mg tablet Take 20 mg by mouth daily. losartan (COZAAR) 50 mg tablet Take 50 mg by mouth daily. buPROPion XL (WELLBUTRIN XL) 150 mg tablet Take 150 mg by mouth every morning. cyanocobalamin (VITAMIN B-12) 1,000 mcg sublingual tablet Take 1,000 mcg by mouth daily. valACYclovir (VALTREX) 500 mg tablet Take 1 Tab by mouth two (2) times a day. Qty: 60 Tab, Refills: 11      cloNIDine HCl (CATAPRES) 0.1 mg tablet Take 0.1 mg by mouth nightly. traZODone (DESYREL) 50 mg tablet Take 50 mg by mouth nightly. busPIRone (BUSPAR) 10 mg tablet Take 10 mg by mouth two (2) times a day. STOP taking these medications       ibuprofen (MOTRIN) 800 mg tablet Comments:   Reason for Stopping:                 The patient is to continue at home with home physical therapy 3 times a week to work on gait training, range of motion, strengthening, and weightbearing exercises as tolerated on his right lower extremity. The patient is to progress from a walker to a cane to complete total weightbearing as tolerable. The patient is to continue to keep his incision dry. The patient is to followup with Dr. Axel Terrell, Bre Lopez PA-C and/or Filippo Willard PA-C in the office approximately 10-14 days status post for x-rays and further evaluation.     ANIKA Rainey  10/2/2018

## 2018-10-01 NOTE — INTERVAL H&P NOTE
H&P Update:  Marilu Rose was seen and examined. History and physical has been reviewed. The patient has been examined.  There have been no significant clinical changes since the completion of the originally dated History and Physical.    Signed By: Gideon Riedel, MD     October 1, 2018 10:56 AM

## 2018-10-01 NOTE — ROUTINE PROCESS
TRANSFER - IN REPORT:    Verbal report received from Daniel Kelsey RN(name) on Tomás Garcia  being received from PACU(unit) for routine post - op      Report consisted of patients Situation, Background, Assessment and   Recommendations(SBAR). Information from the following report(s) SBAR, Kardex, OR Summary, Procedure Summary, Intake/Output, Accordion, Med Rec Status and Cardiac Rhythm NSR was reviewed with the receiving nurse. Opportunity for questions and clarification was provided. Assessment completed upon patients arrival to unit and care assumed.

## 2018-10-01 NOTE — IP AVS SNAPSHOT
[address] [place] [address] 
[phone] Patient: Katt Browne MRN: [id number] IDH:[id number] A check steven indicates which time of day the medication should be taken. My Medications START taking these medications Instructions Each Dose to Equal  
 Morning Noon Evening Bedtime  
 apixaban 2.5 mg tablet Commonly known as:  Anusha Squibb Your last dose was: Your next dose is: Take 1 Tab by mouth two (2) times a day for 14 days. 2.5 mg  
    
   
   
   
  
 cefadroxil 500 mg capsule Commonly known as:  Creta Lat Your last dose was: Your next dose is: Take 1 Cap by mouth two (2) times a day for 5 days. 500 mg  
    
   
   
   
  
 oxyCODONE-acetaminophen 5-325 mg per tablet Commonly known as:  PERCOCET Your last dose was: Your next dose is: Take 1-2 tablets by mouth every 4-6 hours as needed for pain. CHANGE how you take these medications Instructions Each Dose to Equal  
 Morning Noon Evening Bedtime  
 valACYclovir 500 mg tablet Commonly known as:  VALTREX What changed:   
- when to take this 
- reasons to take this Your last dose was: Your next dose is: Take 1 Tab by mouth two (2) times a day. 500 mg CONTINUE taking these medications Instructions Each Dose to Equal  
 Morning Noon Evening Bedtime ALBUTEROL IN Your last dose was: Your next dose is: Take  by inhalation as needed. buPROPion  mg tablet Commonly known as:  Mariya Lob Your last dose was: Your next dose is: Take 150 mg by mouth every morning. 150 mg  
    
   
   
   
  
 busPIRone 10 mg tablet Commonly known as:  BUSPAR Your last dose was: Your next dose is: Take 10 mg by mouth two (2) times a day. 10 mg  
    
   
   
   
  
 cloNIDine HCl 0.1 mg tablet Commonly known as:  CATAPRES Your last dose was: Your next dose is: Take 0.1 mg by mouth nightly. 0.1 mg  
    
   
   
   
  
 LEXAPRO 20 mg tablet Generic drug:  escitalopram oxalate Your last dose was: Your next dose is: Take 20 mg by mouth daily. 20 mg  
    
   
   
   
  
 losartan 50 mg tablet Commonly known as:  COZAAR Your last dose was: Your next dose is: Take 50 mg by mouth daily. 50 mg  
    
   
   
   
  
 traZODone 50 mg tablet Commonly known as:  Milady Flicker Your last dose was: Your next dose is: Take 50 mg by mouth nightly. 50 mg  
    
   
   
   
  
 VITAMIN B-12 1,000 mcg sublingual tablet Generic drug:  cyanocobalamin Your last dose was: Your next dose is: Take 1,000 mcg by mouth daily. 1000 mcg STOP taking these medications   
 ibuprofen 800 mg tablet Commonly known as:  MOTRIN Where to Get Your Medications Information on where to get these meds will be given to you by the nurse or doctor. ! Ask your nurse or doctor about these medications  
  apixaban 2.5 mg tablet  
 cefadroxil 500 mg capsule  
 oxyCODONE-acetaminophen 5-325 mg per tablet

## 2018-10-01 NOTE — PROGRESS NOTES
Problem: Mobility Impaired (Adult and Pediatric)  Goal: *Acute Goals and Plan of Care (Insert Text)  In 1-7 days pt will be able to perform:  ST.  Bed mobility:  Rolling L to R to L modified independent for positioning. 2.  Supine to sit to supine S with HR for meals. 3.  Sit to stand to sit S with RW in prep for ambulation. LT.  Gait:  Ambulate >150ft S with RW, WBAT, for home/community mobility. 2.  Stair Negotiation:  Ascend/descend >5 steps CGA with HR for home entry. 3.  Activity tolerance: Tolerate up in chair 1-2 hours for ADLs. 4.  Patient/Family Education:  Patient/family to be independent with HEP for follow-up care and safe discharge. physical Therapy EVALUATION    Patient: Shaun Phelps (48 y.o. male)  Date: 10/1/2018  Primary Diagnosis: UNILATERAL PRIMARY OSTEOARTHRITIS, RIGHT KNEE  Osteoarthritis of right knee  Procedure(s) (LRB):  RIGHT KNEE MEDIAL UNICONDYLAR KNEE REPLACEMENT VERSUS TOTAL KNEE REPLACEMENT (Right) Day of Surgery   Precautions:   Fall, WBAT    ASSESSMENT :  Based on the objective data described below, the patient presents with decreased functional mobility and independence in regard to bed mobility, transfers, gt quality and tolerance, R knee AROM, R knee strength, pain, stair negotiation and safety due to recent R UKA surgery. Pt rating pain in R knee 6/10 on numerical pain scale. Pt required additional time for all mobility and supine<>sit CGA. Sit<>stand CGA. Pt able to participate in gt training w/ RW, WBAT, GB and CGA w/ antalgic pattern. Pt unable to void standing in bathroom upon attempt. Pt returned to supine in bed w/ all needs within reach, SCDs applied and ice pack to R knee. Nurse Maverick greenwood. [facility] hospital [facility]c. Pt will need RW. Patient will benefit from skilled intervention to address the above impairments.   Patients rehabilitation potential is considered to be Good  Factors which may influence rehabilitation potential include:   []         None noted  []         Mental ability/status  []         Medical condition  []         Home/family situation and support systems  []         Safety awareness  [x]         Pain tolerance/management  []         Other:      PLAN :  Recommendations and Planned Interventions:  [x]           Bed Mobility Training             []    Neuromuscular Re-Education  [x]           Transfer Training                   []    Orthotic/Prosthetic Training  [x]           Gait Training                          []    Modalities  [x]           Therapeutic Exercises          []    Edema Management/Control  [x]           Therapeutic Activities            [x]    Patient and Family Training/Education  []           Other (comment):    Frequency/Duration: Patient will be followed by physical therapy twice daily to address goals. Discharge Recommendations: Home Health  Further Equipment Recommendations for Discharge: rolling walker     SUBJECTIVE:   Patient stated I am feeling soreness right now.     OBJECTIVE DATA SUMMARY:     Past Medical History:   Diagnosis Date    ADD  10/11    Allergic rhinitis     Arthritis     Dr Darrel Lopez; right knee    Asthma     as a child    Chronic back pain     Colon polyp     f/u colo age 46 per Dr Dread Islas from 2014    Depression     FHx: heart disease     GERD (gastroesophageal reflux disease)     Hepatic steatosis     HTN (hypertension) 2010    resolved after gastric bypass    Obesity     s/p gastric bypass 5/10 Dr. Megan Morton BMI 54, peak weight 367; elvis 195 lbs; failed qsymia 2017-18; IF 9/18 start weight 299 lbs    FRANCISCA on CPAP     resolved after wt loss    Osteoarthritis of right knee 9/30/2018    Primary hypertension 3/28/2017    Stomach ulcer 2002    Dr. Dread Islas    Ulcerative proctitis [facility])      Past Surgical History:   Procedure Laterality Date    BIOPSY LIVER      Dr Indra Vicente  03/2017    echo nl lv, ef 60%, mild aortic root dilation Jamie Galvan; saw [id number] then Dr Kelby Dhillon later   Angely Carp CHOLECYSTECTOMY  9/08    Dr Vega Senior      2002 negative, last 2005 proctitis Dr Linda Villalobos  3/10    Lap Cherri-en-Y BMI 47 Dr Robert Steve preop weight 367 lbs    HX ORTHOPAEDIC Left     left hip Dr Gay Gómez age 10     Barriers to Learning/Limitations: None  Compensate with: visual, verbal, tactile, kinesthetic cues/model  Prior Level of Function/Home Situation:   Home Situation  Home Environment: Private residence  # Steps to Enter: 6  Rails to Enter: Yes  Hand Rails : Bilateral  One/Two Story Residence: Two story  # of Interior Steps: 13  Interior Rails: Right  Lift Chair Available: No  Living Alone: No  Support Systems: Spouse/Significant Other/Partner  Patient Expects to be Discharged to[de-identified] Private residence  Current DME Used/Available at Home: None  Critical Behavior:  Neurologic State: Alert; Appropriate for age  Orientation Level: Oriented X4  Cognition: Appropriate decision making; Appropriate for age attention/concentration; Appropriate safety awareness; Follows commands  Safety/Judgement: Awareness of environment  Psychosocial  Patient Behaviors: Calm; Cooperative  Purposeful Interaction: Yes  Pt Identified Daily Priority: Clinical issues (comment)  Caritas Process: Nurture loving kindness  Caring Interventions: Reassure; Therapeutic modalities  Reassure: Therapeutic listening; Informing  Therapeutic Modalities: Deep breathing;Humor; Intentional therapeutic touch  Skin Condition/Temp: Dry;Warm  Skin Integrity: Incision (comment) (R knee)  Skin Integumentary  Skin Color: Appropriate for ethnicity  Skin Condition/Temp: Dry;Warm  Skin Integrity: Incision (comment) (R knee)  Turgor: Non-tenting  Hair Growth: Present  Varicosities: Absent  Strength:    Strength: Generally decreased, functional  Tone & Sensation:   Tone: Normal  Sensation: Intact  Range Of Motion:  AROM: Generally decreased, functional  Functional Mobility:  Bed Mobility:  Supine to Sit: Contact guard assistance (vc)  Sit to Supine: Contact guard assistance (vc)  Scooting: Contact guard assistance (vc)  Transfers:  Sit to Stand: Contact guard assistance (vc)  Stand to Sit: Contact guard assistance (vc)  Balance:   Sitting: Intact  Standing: Intact; With support  Ambulation/Gait Training:  Distance (ft): 34 Feet (ft)  Assistive Device: Walker, rolling;Gait belt  Ambulation - Level of Assistance: Contact guard assistance (vc)  Gait Abnormalities: Antalgic;Decreased step clearance; Step to gait  Right Side Weight Bearing: As tolerated  Base of Support: Shift to left  Stance: Right decreased  Speed/Jennifer: Slow  Step Length: Left shortened;Right shortened  Swing Pattern: Left asymmetrical;Right asymmetrical  Interventions: Safety awareness training; Tactile cues; Verbal cues; Visual/Demos  Therapeutic Exercises:   HEP written copy issued to pt per MD protocol. Pain:  Pain Scale 1: Numeric (0 - 10)  Pain Intensity 1: 6  Pain Location 1: Knee  Pain Orientation 1: Right  Pain Description 1: Aching;Dull  Pain Intervention(s) 1: Declines  Activity Tolerance:   Fair   Please refer to the flowsheet for vital signs taken during this treatment. After treatment:   []         Patient left in no apparent distress sitting up in chair  [x]         Patient left in no apparent distress in bed  [x]         Call bell left within reach  [x]         Nursing notified  []         Caregiver present  []         Bed alarm activated    COMMUNICATION/EDUCATION:   [x]         Fall prevention education was provided and the patient/caregiver indicated understanding. [x]         Patient/family have participated as able in goal setting and plan of care. [x]         Patient/family agree to work toward stated goals and plan of care. []         Patient understands intent and goals of therapy, but is neutral about his/her participation.   []         Patient is unable to participate in goal setting and plan of care. Thank you for this referral.  Chino Aquino, PT   Time Calculation: 31 mins      Eval Complexity: History: HIGH Complexity :3+ comorbidities / personal factors will impact the outcome/ POC Exam:MEDIUM Complexity : 3 Standardized tests and measures addressing body structure, function, activity limitation and / or participation in recreation  Presentation: LOW Complexity : Stable, uncomplicated  Clinical Decision Making:Low Complexity amb >30' Overall Complexity:LOW      Mobility  Current  CI= 1-19%   Goal  CI= 1-19%. The severity rating is based on the Level of Assistance required for Functional Mobility and ADLs.

## 2018-10-01 NOTE — PROGRESS NOTES
6445- Patient arrives to unit at this time. Dual skin assessment completed with Amber Joyce RN, no abnormalities noted besides surgical incision to right knee. Admission assessment completed. Patient is A/O x 4. Lungs clear, radial pulses present , pedal pulses present , abdomen soft and non-distended. Bowel sounds active, 18 G IV to LFA  intact and patent infusing. No signs of phlebitis or infiltration noted. TEDs and plexis applied bilaterally. Skin warm and dry  with  mepilex dressing to right knee CDI. Patient denies numbness/tingling. Pain 5/10, declines pain medication at this time. Patient was oriented to the room to include use of call bell, meal ordering, and use of incentive spirometer, patient able to get IS to 3000. Patient was given explanation of \" up for dinner\" program and has verbalized understanding. Phone and call bell left within reach. Plan of care for the day addressed with patient. Educated on pain medication availability and possible side effects as well as need to call for assistance before getting out of bed, patient verbalized understanding.

## 2018-10-01 NOTE — ROUTINE PROCESS
Bedside and Verbal shift change report given to Israel Sorto RN by Enoch Correa RN.  Report included the following information SBAR, Kardex, OR Summary, Intake/Output and MAR

## 2018-10-01 NOTE — ANESTHESIA PROCEDURE NOTES
Peripheral Block    Start time: 10/1/2018 12:02 PM  End time: 10/1/2018 12:06 PM  Performed by: Jose Barron  Authorized by: Jose Barron       Pre-procedure: Indications: at surgeon's request and post-op pain management    Preanesthetic Checklist: patient identified, risks and benefits discussed, site marked, timeout performed, anesthesia consent given and patient being monitored    Timeout Time: 12:02          Block Type:   Block Type:   Adductor canal  Laterality:  Right  Monitoring:  Standard ASA monitoring, continuous pulse ox, frequent vital sign checks, heart rate, oxygen and responsive to questions  Injection Technique:  Single shot  Procedures: ultrasound guided    Patient Position: supine (frog leg)  Prep: chlorhexidine    Location:  Mid thigh  Needle Type:  Stimuplex  Needle Gauge:  21 G  Needle Localization:  Ultrasound guidance  Medication Injected:  0.5%  ropivacaine  Volume (mL):  30    Assessment:  Number of attempts:  1  Injection Assessment:  Incremental injection every 5 mL, local visualized surrounding nerve on ultrasound, negative aspiration for blood, no intravascular symptoms, no paresthesia and ultrasound image on chart  Patient tolerance:  Patient tolerated the procedure well with no immediate complications

## 2018-10-01 NOTE — IP AVS SNAPSHOT
[address] [facility] 
 
 
 [address] [facility] [address] 
[phone] Patient: Fernando Benito MRN: [id number] YNQ:[birth year] About your hospitalization You were admitted on:  October 1, 2018 You last received care in the:  THE [facility] [address] You were discharged on:  October 2, 2018 Why you were hospitalized Your primary diagnosis was:  Osteoarthritis Of Right Knee Follow-up Information Follow up With Details Comments Contact Roberto Roper MD On 10/12/2018 Follow up appointment @ 8:30am [address] [place] [address] Rd [address] [place] [address] 
[phone] Kiel Laboy MD   [address] [facility] [address] 200 [facility] 
[phone] [address] [facility] to continue managing your healthcare needs. [phone] Discharge Orders None A check steven indicates which time of day the medication should be taken. My Medications START taking these medications Instructions Each Dose to Equal  
 Morning Noon Evening Bedtime  
 apixaban 2.5 mg tablet Commonly known as:  Raúl Ferries Your last dose was: Your next dose is: Take 1 Tab by mouth two (2) times a day for 14 days. 2.5 mg  
    
   
   
   
  
 cefadroxil 500 mg capsule Commonly known as:  Martha Pale Your last dose was: Your next dose is: Take 1 Cap by mouth two (2) times a day for 5 days. 500 mg  
    
   
   
   
  
 oxyCODONE-acetaminophen 5-325 mg per tablet Commonly known as:  PERCOCET Your last dose was: Your next dose is: Take 1-2 tablets by mouth every 4-6 hours as needed for pain. CHANGE how you take these medications Instructions Each Dose to Equal  
 Morning Noon Evening Bedtime  
 valACYclovir 500 mg tablet Commonly known as:  VALTREX What changed:   
- when to take this - reasons to take this Your last dose was: Your next dose is: Take 1 Tab by mouth two (2) times a day. 500 mg CONTINUE taking these medications Instructions Each Dose to Equal  
 Morning Noon Evening Bedtime ALBUTEROL IN Your last dose was: Your next dose is: Take  by inhalation as needed. buPROPion  mg tablet Commonly known as:  Marilee Colon Your last dose was: Your next dose is: Take 150 mg by mouth every morning. 150 mg  
    
   
   
   
  
 busPIRone 10 mg tablet Commonly known as:  BUSPAR Your last dose was: Your next dose is: Take 10 mg by mouth two (2) times a day. 10 mg  
    
   
   
   
  
 cloNIDine HCl 0.1 mg tablet Commonly known as:  CATAPRES Your last dose was: Your next dose is: Take 0.1 mg by mouth nightly. 0.1 mg  
    
   
   
   
  
 LEXAPRO 20 mg tablet Generic drug:  escitalopram oxalate Your last dose was: Your next dose is: Take 20 mg by mouth daily. 20 mg  
    
   
   
   
  
 losartan 50 mg tablet Commonly known as:  COZAAR Your last dose was: Your next dose is: Take 50 mg by mouth daily. 50 mg  
    
   
   
   
  
 traZODone 50 mg tablet Commonly known as:  Elonda Hiren Your last dose was: Your next dose is: Take 50 mg by mouth nightly. 50 mg  
    
   
   
   
  
 VITAMIN B-12 1,000 mcg sublingual tablet Generic drug:  cyanocobalamin Your last dose was: Your next dose is: Take 1,000 mcg by mouth daily. 1000 mcg STOP taking these medications   
 ibuprofen 800 mg tablet Commonly known as:  MOTRIN Where to Get Your Medications Information on where to get these meds will be given to you by the nurse or doctor. ! Ask your nurse or doctor about these medications  
  apixaban 2.5 mg tablet  
 cefadroxil 500 mg capsule  
 oxyCODONE-acetaminophen 5-325 mg per tablet Opioid Education Prescription Opioids: What You Need to Know: 
 
Prescription opioids can be used to help relieve moderate-to-severe pain and are often prescribed following a surgery or injury, or for certain health conditions. These medications can be an important part of treatment but also come with serious risks. Opioids are strong pain medicines. Examples include hydrocodone, oxycodone, fentanyl, and morphine. Heroin is an example of an illegal opioid. It is important to work with your health care provider to make sure you are getting the safest, most effective care. WHAT ARE THE RISKS AND SIDE EFFECTS OF OPIOID USE? Prescription opioids carry serious risks of addiction and overdose, especially with prolonged use. An opioid overdose, often marked by slow breathing, can cause sudden death. The use of prescription opioids can have a number of side effects as well, even when taken as directed. · Tolerance-meaning you might need to take more of a medication for the same pain relief · Physical dependence-meaning you have symptoms of withdrawal when the medication is stopped. Withdrawal symptoms can include nausea, sweating, chills, diarrhea, stomach cramps, and muscle aches. Withdrawal can last up to several weeks, depending on which drug you took and how long you took it. · Increased sensitivity to pain · Constipation · Nausea, vomiting, and dry mouth · Sleepiness and dizziness · Confusion · Depression · Low levels of testosterone that can result in lower sex drive, energy, and strength · Itching and sweating RISKS ARE GREATER WITH:      
· History of drug misuse, substance use disorder, or overdose · Mental health conditions (such as depression or anxiety) · Sleep apnea · Older age (72 years or older) · Pregnancy Avoid alcohol while taking prescription opioids. Also, unless specifically advised by your health care provider, medications to avoid include: · Benzodiazepines (such as Xanax or Valium) · Muscle relaxants (such as Soma or Flexeril) · Hypnotics (such as Ambien or Lunesta) · Other prescription opioids KNOW YOUR OPTIONS Talk to your health care provider about ways to manage your pain that don't involve prescription opioids. Some of these options may actually work better and have fewer risks and side effects. Consult your physician before adding or stopping any medications, treatments, or physical activity. Options may include: 
· Pain relievers such as acetaminophen, ibuprofen, and naproxen · Some medications that are also used for depression or seizures · Physical therapy and exercise · Counseling to help patients learn how to cope better with triggers of pain and stress. · Application of heat or cold compress · Massage therapy · Relaxation techniques Be Informed Make sure you know the name of your medication, how much and how often to take it, and its potential risks & side effects. IF YOU ARE PRESCRIBED OPIOIDS FOR PAIN: 
· Never take opioids in greater amounts or more often than prescribed. Remember the goal is not to be pain-free but to manage your pain at a tolerable level. · Follow up with your primary care provider to: · Work together to create a plan on how to manage your pain. · Talk about ways to help manage your pain that don't involve prescription opioids. · Talk about any and all concerns and side effects. · Help prevent misuse and abuse. · Never sell or share prescription opioids · Help prevent misuse and abuse. · Store prescription opioids in a secure place and out of reach of others (this may include visitors, children, friends, and family).  
· Safely dispose of unused/unwanted prescription opioids: Find your community drug take-back program or your pharmacy mail-back program, or flush them down the toilet, following guidance from the Food and Drug Administration (www.fda.gov/Drugs/ResourcesForYou). · Visit www.cdc.gov/drugoverdose to learn about the risks of opioid abuse and overdose. · If you believe you may be struggling with addiction, tell your health care provider and ask for guidance or call [address] [place], [place] [address]rose [facility] at [phone]. Discharge Instructions [address] [facility] Patient Discharge Instructions Binu Israel / [id number] : 1980 Admitted 10/1/2018 Discharged: 10/1/2018 IF YOU HAVE ANY PROBLEMS ONCE YOU ARE AT HOME CALL THE FOLLOWING NUMBERS:  
Main office number: [phone] Your follow up appointment to see either Dr. Patricia Costa PA-C, or [facility] MEME as scheduled in 2 weeks. If you are unsure of your appointment date call the office at [phone]. Medication Instructions · Resume your home medictions as directed, you may have directed not to resume supplements until after your follow up. · A prescription for pain medication has been given · It is important that you take the medication exactly as they are prescribed. · Keep your medication in the bottles provided by the pharmacist and keep a list of the medication names, dosages, and times to be taken in your wallet. · Do not take other medications without consulting your doctor. What to do at [facility] Resume your prehospital diet. If you have excessive nausea or vomitting call your doctor's office. Be sure to maintain adequate fluid intake. Some pain medications may cause constipation. Remember to drink fluids, stay as active as possible, and eat plenty of fiber-rich foods.  
 
Begin In-Home Physical Therapy; 3 times a week to work on gait training, range of motion, strengthening, and weight bearing exercises as tolerable. Continue to use your walker or cane when walking. May progress from the walker to a cane to complete total bearing as tolerable. Patient may shower. Wrap incision with plastic wrap/covering to prevent incision from getting wet. Avoid complete immersion. YOUR DRESSING SHOULD BE CHANGED IN 3-5 DAYS BY [facility] NURSE. When to Call - Call if you have a temperature greater then 101 
- Unable to keep food down - Are unable to bear any wieght  
- Need a pain medication refill Information obtained by : 
I understand that if any problems occur once I am at home I am to contact my physician. I understand and acknowledge receipt of the instructions indicated above. Physician's or R.N.'s Signature                                                                  Date/Time Patient or Representative Signature                                                          Date/Time [employer] Announcement We are excited to announce that we are making your provider's discharge notes available to you in [employer]. You will see these notes when they are completed and signed by the physician that discharged you from your recent hospital stay. If you have any questions or concerns about any information you see in Seeq, please call the Health Information Department where you were seen or reach out to your Primary Care Provider for more information about your plan of care. Introducing [facility] HE[facility] SERVICES! Dear Venkata: 
Thank you for requesting a Seeq account.   Our records indicate that you already have an active [employer] account. You can access your account anytime at [email or web address] Did you know that you can access your hospital and ER discharge instructions at any time in [employer]? You can also review all of your test results from your hospital stay or ER visit. Additional Information If you have questions, please visit the Frequently Asked Questions section of the [employer] website at [email or web address] Remember, [employer] is NOT to be used for urgent needs. For medical emergencies, dial 911. Now available from your iPhone and Android! Introducing Mahin Rodriguez As a [employer] patient, I wanted to make you aware of our electronic visit tool called Mahin Rodriguez. [employer] 24/7 allows you to connect within minutes with a medical provider 24 hours a day, seven days a week via a mobile device or tablet or logging into a secure website from your computer. You can access Mahin Rodriguez from anywhere in the [place]. A virtual visit might be right for you when you have a simple condition and feel like you just dont want to get out of bed, or cant get away from work for an appointment, when your regular [employer] provider is not available (evenings, weekends or holidays), or when youre out of town and need minor care. Electronic visits cost only $49 and if the [employer] 24/7 provider determines a prescription is needed to treat your condition, one can be electronically transmitted to a nearby pharmacy*. Please take a moment to enroll today if you have not already done so. The enrollment process is free and takes just a few minutes. To enroll, please download the [employer] 24/7 vu to your tablet or phone, or visit [email or web address] to enroll on your computer.    
And, as an [address] [place] [address] patient with a [employer] account, the results of your visits will be scanned into your electronic medical record and your primary care provider will be able to view the scanned results. We urge you to continue to see your regular [employer] provider for your ongoing medical care. And while your primary care provider may not be the one available when you seek a Mahin Rodriguez virtual visit, the peace of mind you get from getting a real diagnosis real time can be priceless. For more information on Mahin Fatimafin, view our Frequently Asked Questions (FAQs) at [email or web address]. Sincerely, 
 
Duran Coburn MD 
Chief Medical Officer [address] James *:  certain medications cannot be prescribed via Mahin Kushalfin Providers Seen During Your Hospitalization Provider Specialty Primary office phone Gideon Riedel, MD Orthopedic Surgery [phone] Your Primary Care Physician (PCP) Primary Care Physician Office Phone Office Fax Judson Clink [phone] You are allergic to the following Allergen Reactions Lisinopril Cough Recent Documentation Height Weight BMI Smoking Status 1.727 m 135.2 kg 45.31 kg/m2 Former Smoker Emergency Contacts Name Discharge Info Relation Home Work Mobile [facility] CAREGIVER [3] Spouse [3] [phone] Patient Belongings The following personal items are in your possession at time of discharge: 
  Dental Appliances: None  Visual Aid: None      Home Medications: None   Jewelry: None  Clothing: Socks, Pants, Undergarments, Shirt, Footwear (w/ Amanda)    Other Valuables: Wallet, Cell Phone, Eyeglasses (w/ Amanda) Please provide this summary of care documentation to your next provider. Signatures-by signing, you are acknowledging that this After Visit Summary has been reviewed with you and you have received a copy. Patient Signature:  ____________________________________________________________ Date:  ____________________________________________________________  
  
Darlynn Lango Provider Signature:  ____________________________________________________________ Date:  ____________________________________________________________

## 2018-10-01 NOTE — OP NOTES
[address] [facility] [address] Medicine  Unicompartment Knee Arthroplasty    Patient: Brice Galvan MRN: [id number]  SSN: [id number]    YOB: 1980  Age: 34 y.o. Sex: male      Date of Surgery: 10/1/2018   Preoperative Diagnosis: UNILATERAL PRIMARY OSTEOARTHRITIS, RIGHT KNEE   Postoperative Diagnosis: UNILATERAL PRIMARY OSTEOARTHRITIS, RIGHT KNEE   Location: [facility]  Surgeon: Carol Jung MD  Assistant: Bre Lopez PA-C    Anesthesia: General and adductor canal Nerve Block    Procedure: Right Unicompartment Knee Arthroplasty    Findings:  Degenerative joint disease of the right knee. Estimated Blood Loss: 100ml    Specimens: None    Implants:   Implant Name Type Inv. Item Serial No.  Lot No. LRB No. Used Action   CEMENT BNE SMARTSET HV 40GM -- ORDER IN SETS OF 20 - [id number]  CEMENT BNE SMARTSET HV 40GM -- ORDER IN SETS OF 20  [employer] [id number] Right 1 Implanted   MEDACTA TIBIAL TRAY      [id number] Right 1 Implanted   MEDACTA TIBIAL INSERT      [id number] Right 1 Implanted   MEDACTA FEMORAL COMPONET          [id number] Right 1 Implanted        Procedure Detail:   The patient was brought into the operating suite. He was placed supine on the operating room table and effectively anesthetized using general anesthesia. He had been anesthetized in the preoperative holding area with a continuous femoral nerve block, and a well-padded tourniquet was placed on his right thigh. The right lower extremity was then prepped and draped in the normal sterile orthopedic fashion. He was given appropriate intravenous antibiotic preoperatively. After a timeout was called identifying the patient, procedure, operative side, and surgeon, . A midline skin incision was made over the patella through skin and subcutaneous tissue. A medial arthrotomy was performed and the degenerative changes in the knee were noted, limited to the medial compartment.  At this point, a medial meniscectomy was carried out. The extramedullary tibial alignment guide was then assembled over the tibia in neutral varus-valgus anterior slope relative to the long axis of the tibia, and pinned into position to resect 4 mm of bone at the lowest point of the tibial plateau, which was then resected with the oscillating saw and the reciprocating saw, taking care not to damage the anterior cruciate ligament insertion into the medial tibial spine. The flexion and extension gaps were then checked and measured a loose 10 mm in flexion and a tight 12 mm in extension. The knee was then placed in full extension, and the distal femoral cutting guide was pinned in position, and the distal femur was resected with the oscillating saw. The knee was flexed to 90 degrees. The sizing guide was placed. I felt the size 6 would be appropriate. The size 6 cutting block was then pinned in position, and the appropriate cuts were made, and the 2 lug holes were drilled. The tibial base plate was trialed. I felt the size 5 gave good coverage without overhang. It was pinned in position, and the keel punch was performed, and the drill was used for the post. The trials were removed. The bone was irrigated copiously with the pulse lavage system, and the meniscal beds were injected with a combination of Ropivacaine, Toradol, and epinephrine. The components were then cemented in place. Excess cement was removed with the plastic curette. Once the cement had hardened, the knee was once again ranged with excellent tracking and stability once again. The knee was then irrigated once again and then closed in layers, flexed at 45 degrees, with the knee fascia closed with #2 Quill suture in a running-type stitch. The subcutaneous tissue was closed with 2-0 Vicryl in a simple buried stitch, and the skin was closed with Prineo. A dry sterile dressing was then applied, Ace wrap was applied.  He tolerated this well, was transferred to the bed, and taken to the recovery room extubated and in stable condition. All sponge and needle counts were correct.     Signed By: Ulises Morejon MD     October 1, 2018

## 2018-10-02 ENCOUNTER — HOME HEALTH ADMISSION (OUTPATIENT)
Dept: HOME HEALTH SERVICES | Facility: HOME HEALTH | Age: 38
End: 2018-10-02
Payer: COMMERCIAL

## 2018-10-02 VITALS
OXYGEN SATURATION: 100 % | HEIGHT: 68 IN | RESPIRATION RATE: 18 BRPM | WEIGHT: 298 LBS | HEART RATE: 81 BPM | DIASTOLIC BLOOD PRESSURE: 65 MMHG | SYSTOLIC BLOOD PRESSURE: 105 MMHG | TEMPERATURE: 97.8 F | BODY MASS INDEX: 45.16 KG/M2

## 2018-10-02 LAB
ANION GAP SERPL CALC-SCNC: 9 MMOL/L (ref 3–18)
BUN SERPL-MCNC: 11 MG/DL (ref 7–18)
BUN/CREAT SERPL: 12 (ref 12–20)
CALCIUM SERPL-MCNC: 8.3 MG/DL (ref 8.5–10.1)
CHLORIDE SERPL-SCNC: 105 MMOL/L (ref 100–108)
CO2 SERPL-SCNC: 26 MMOL/L (ref 21–32)
CREAT SERPL-MCNC: 0.89 MG/DL (ref 0.6–1.3)
ERYTHROCYTE [DISTWIDTH] IN BLOOD BY AUTOMATED COUNT: 13.2 % (ref 11.6–14.5)
GLUCOSE SERPL-MCNC: 113 MG/DL (ref 74–99)
HCT VFR BLD AUTO: 35 % (ref 36–48)
HGB BLD-MCNC: 11.8 G/DL (ref 13–16)
MCH RBC QN AUTO: 29.9 PG (ref 24–34)
MCHC RBC AUTO-ENTMCNC: 33.7 G/DL (ref 31–37)
MCV RBC AUTO: 88.8 FL (ref 74–97)
PLATELET # BLD AUTO: 232 K/UL (ref 135–420)
PMV BLD AUTO: 8.7 FL (ref 9.2–11.8)
POTASSIUM SERPL-SCNC: 4 MMOL/L (ref 3.5–5.5)
RBC # BLD AUTO: 3.94 M/UL (ref 4.7–5.5)
SODIUM SERPL-SCNC: 140 MMOL/L (ref 136–145)
WBC # BLD AUTO: 11.7 K/UL (ref 4.6–13.2)

## 2018-10-02 PROCEDURE — 74011250637 HC RX REV CODE- 250/637: Performed by: PHYSICIAN ASSISTANT

## 2018-10-02 PROCEDURE — 85027 COMPLETE CBC AUTOMATED: CPT | Performed by: PHYSICIAN ASSISTANT

## 2018-10-02 PROCEDURE — 97116 GAIT TRAINING THERAPY: CPT

## 2018-10-02 PROCEDURE — 74011250636 HC RX REV CODE- 250/636: Performed by: PHYSICIAN ASSISTANT

## 2018-10-02 PROCEDURE — 36415 COLL VENOUS BLD VENIPUNCTURE: CPT | Performed by: PHYSICIAN ASSISTANT

## 2018-10-02 PROCEDURE — 97530 THERAPEUTIC ACTIVITIES: CPT

## 2018-10-02 PROCEDURE — 80048 BASIC METABOLIC PNL TOTAL CA: CPT | Performed by: PHYSICIAN ASSISTANT

## 2018-10-02 PROCEDURE — 74011250637 HC RX REV CODE- 250/637: Performed by: ORTHOPAEDIC SURGERY

## 2018-10-02 RX ORDER — ACETAMINOPHEN 325 MG/1
650 TABLET ORAL
Status: DISCONTINUED | OUTPATIENT
Start: 2018-10-02 | End: 2018-10-02 | Stop reason: HOSPADM

## 2018-10-02 RX ADMIN — OXYCODONE HYDROCHLORIDE 5 MG: 5 TABLET ORAL at 13:29

## 2018-10-02 RX ADMIN — ESCITALOPRAM OXALATE 20 MG: 10 TABLET ORAL at 09:00

## 2018-10-02 RX ADMIN — FERROUS SULFATE TAB 325 MG (65 MG ELEMENTAL FE) 325 MG: 325 (65 FE) TAB at 15:15

## 2018-10-02 RX ADMIN — BUSPIRONE HYDROCHLORIDE 10 MG: 5 TABLET ORAL at 09:01

## 2018-10-02 RX ADMIN — LOSARTAN POTASSIUM 50 MG: 50 TABLET ORAL at 09:01

## 2018-10-02 RX ADMIN — ACETAMINOPHEN 650 MG: 325 TABLET ORAL at 09:01

## 2018-10-02 RX ADMIN — PANTOPRAZOLE SODIUM 40 MG: 40 TABLET, DELAYED RELEASE ORAL at 09:00

## 2018-10-02 RX ADMIN — APIXABAN 2.5 MG: 2.5 TABLET, FILM COATED ORAL at 09:01

## 2018-10-02 RX ADMIN — FERROUS SULFATE TAB 325 MG (65 MG ELEMENTAL FE) 325 MG: 325 (65 FE) TAB at 09:01

## 2018-10-02 RX ADMIN — DOCUSATE SODIUM 100 MG: 100 CAPSULE, LIQUID FILLED ORAL at 09:01

## 2018-10-02 RX ADMIN — ACETAMINOPHEN 650 MG: 325 TABLET ORAL at 15:15

## 2018-10-02 RX ADMIN — BUPROPION HYDROCHLORIDE 150 MG: 150 TABLET, FILM COATED, EXTENDED RELEASE ORAL at 06:55

## 2018-10-02 RX ADMIN — CEFAZOLIN SODIUM 3 G: 10 INJECTION, POWDER, FOR SOLUTION INTRAVENOUS at 05:40

## 2018-10-02 RX ADMIN — CYANOCOBALAMIN TAB 500 MCG 1000 MCG: 500 TAB at 09:00

## 2018-10-02 NOTE — PROGRESS NOTES
Progress Note        Patient: Nadja Teran MRN: [id number]  [id number]: [id number]    YOB: 1980  Age: 34 y.o. Sex: male      1 Day Post-Op status post Procedure(s) (LRB):  RIGHT KNEE MEDIAL UNICONDYLAR KNEE REPLACEMENT VERSUS TOTAL KNEE REPLACEMENT (Right)    Admit Date: 10/1/2018  Admit Diagnosis: UNILATERAL PRIMARY OSTEOARTHRITIS, RIGHT KNEE  Osteoarthritis of right knee    Subjective:      Doing well. No complaints. No SOB. No Chest Pain. No Nausea or Vomiting. No problems eating or voiding. Objective:        Temp (24hrs), Av.8 °F (36.6 °C), Min:97.4 °F (36.3 °C), Max:98.5 °F (36.9 °C)    Body mass index is 45.31 kg/(m^2). Patient Vitals for the past 12 hrs:   BP Temp Pulse Resp SpO2   10/02/18 0308 103/56 97.4 °F (36.3 °C) 93 16 100 %   10/01/18 2312 104/64 97.4 °F (36.3 °C) 92 16 96 %     Recent Labs      10/02/18   0240   HGB  11.8*   HCT  35.0*   NA  140   K  4.0   CL  105   CO2  26   BUN  11   CREA  0.89   GLU  113*       Physical Exam:  Vital Signs are Stable. No Acute Distress. Alert and Oriented. Negative Homans sign. Toes AROM Full. Neurovascular exam is normal.    Dressing is Clean, Dry, and Intact. Assessment/Plan:     1. Continue oob/rehab  2.  D/c planning    Continue PT/OT  Continue CPM/ROM    Discharge Plan: Home with Home Health    Signed By: Elizabeth Hubbard MD     2018

## 2018-10-02 NOTE — DISCHARGE INSTRUCTIONS
[address] [place] [address] Sports Medicine   Patient Discharge Instructions    Cassi Mandel / [id number] : 1980    Admitted 10/1/2018 Discharged: 10/1/2018     IF YOU HAVE ANY PROBLEMS ONCE YOU ARE AT HOME CALL THE FOLLOWING NUMBERS:   Main office number: [phone]    Your follow up appointment to see either Dr. Valerio Granado PA-C, or [facility] MEME as scheduled in 2 weeks. If you are unsure of your appointment date call the office at [phone]. Medication Instructions     · Resume your home medictions as directed, you may have directed not to resume supplements until after your follow up. · A prescription for pain medication has been given   · It is important that you take the medication exactly as they are prescribed. · Keep your medication in the bottles provided by the pharmacist and keep a list of the medication names, dosages, and times to be taken in your wallet. · Do not take other medications without consulting your doctor. What to do at [address] [place] [address] Ave your prehospital diet. If you have excessive nausea or vomitting call your doctor's office. Be sure to maintain adequate fluid intake. Some pain medications may cause constipation. Remember to drink fluids, stay as active as possible, and eat plenty of fiber-rich foods. Begin In-Home Physical Therapy; 3 times a week to work on gait training, range of motion, strengthening, and weight bearing exercises as tolerable. Continue to use your walker or cane when walking. May progress from the walker to a cane to complete total bearing as tolerable. Patient may shower. Wrap incision with plastic wrap/covering to prevent incision from getting wet. Avoid complete immersion. YOUR DRESSING SHOULD BE CHANGED IN 3-5 DAYS BY [facility] NURSE.       When to Call    - Call if you have a temperature greater then 101  - Unable to keep food down  - Are unable to bear any wieght   - Need a pain medication refill     Information obtained by :  I understand that if any problems occur once I am at home I am to contact my physician. I understand and acknowledge receipt of the instructions indicated above.                                                                                                                                            Physician's or R.N.'s Signature                                                                  Date/Time                                                                                                                                              Patient or Representative Signature                                                          Date/Time

## 2018-10-02 NOTE — PROGRESS NOTES
0740 - Bedside and Verbal shift change report given to Alfredo Wong RN by Liu Ludwig RN. Report included the following information SBAR, Kardex, OR Summary, Intake/Output and MAR.

## 2018-10-02 NOTE — PROGRESS NOTES
Problem: Mobility Impaired (Adult and Pediatric)  Goal: *Acute Goals and Plan of Care (Insert Text)  In 1-7 days pt will be able to perform:  ST.  Bed mobility:  Rolling L to R to L modified independent for positioning. 2.  Supine to sit to supine S with HR for meals. 3.  Sit to stand to sit S with RW in prep for ambulation. LT.  Gait:  Ambulate >150ft S with RW, WBAT, for home/community mobility. 2.  Stair Negotiation:  Ascend/descend >5 steps CGA with HR for home entry. 3.  Activity tolerance: Tolerate up in chair 1-2 hours for ADLs. 4.  Patient/Family Education:  Patient/family to be independent with HEP for follow-up care and safe discharge. Outcome: Progressing Towards Goal  physical Therapy TREATMENT    Patient: Syd Venegas (54 y.o. male)  Date: 10/2/2018  Diagnosis: UNILATERAL PRIMARY OSTEOARTHRITIS, RIGHT KNEE  Osteoarthritis of right knee Osteoarthritis of right knee  Procedure(s) (LRB):  RIGHT KNEE MEDIAL UNICONDYLAR KNEE REPLACEMENT VERSUS TOTAL KNEE REPLACEMENT (Right) 1 Day Post-Op  Precautions: Fall, WBAT   Chart, physical therapy assessment, plan of care and goals were reviewed. ASSESSMENT:  Patient showing improved mobility, and increasing ambulation distance with RW. Pt is initially impulsive and VCs needed to slow job and increase WB to RLE. Moderate pain reported with movement. No buckling noted while ambulating. However buckling noted x2 with stair training. Reviewed HEP. Will re attempt this PM. PA declined KI for additional support/ safety. Cont POC. Progression toward goals:  []      Improving appropriately and progressing toward goals  [x]      Improving slowly and progressing toward goals  []      Not making progress toward goals and plan of care will be adjusted     PLAN:  Patient continues to benefit from skilled intervention to address the above impairments. Continue treatment per established plan of care.   Discharge Recommendations:  Home Health  Further Equipment Recommendations for Discharge:  rolling walker     SUBJECTIVE:   Patient stated I dont want to take the stronger pain medicine     OBJECTIVE DATA SUMMARY:   Critical Behavior:  Neurologic State: Alert, Appropriate for age  Orientation Level: Oriented X4  Cognition: Appropriate decision making, Appropriate for age attention/concentration, Appropriate safety awareness  Safety/Judgement: Awareness of environment  Functional Mobility Training:  Bed Mobility:  Supine to Sit: Supervision  Sit to Supine: Supervision  Transfers:  Sit to Stand: Supervision  Stand to Sit: Supervision  Balance:  Sitting: Intact  Standing: Intact; With support  Ambulation/Gait Training:  Distance (ft): 150 Feet (ft)  Assistive Device: Walker, rolling;Gait belt  Ambulation - Level of Assistance: Supervision  Gait Abnormalities: Antalgic;Decreased step clearance; Step to gait  Right Side Weight Bearing: As tolerated  Base of Support: Shift to left  Stance: Right decreased  Speed/Jennifer: Slow  Swing Pattern: Right asymmetrical;Left asymmetrical  Interventions: Safety awareness training;Verbal cues    Stairs:  Number of Stairs Trained: 2  Stairs - Level of Assistance: Contact guard assistance  Rail Use: Both    Therapeutic Exercises:   Reviewed HEP per protocol x5    Pain:  Pain Scale 1: Numeric (0 - 10)  Pain Intensity 1: 7  Pain Location 1: Knee  Pain Orientation 1: Right  Pain Description 1: Aching  Pain Intervention(s) 1: Declines  Activity Tolerance:   Good     After treatment:   [] Patient left in no apparent distress sitting up in chair  [x] Patient left in no apparent distress in bed  [x] Call bell left within reach  [] Nursing notified  [] Caregiver present  [] Bed alarm activated      Milad Catherine PTA   Time Calculation: 23 mins

## 2018-10-02 NOTE — PROGRESS NOTES
[address] [facility]- Assumed care of patient at this time. Report received from Erlin Torres RN. Patient in chair. Pain 5/10, declines pain medication at this time. Call bell left within reach. 46ANIKA Hoffman contacted per patient request for Tylenol 650 mg PO due to not wanting to take oxycodone, order given, order entered    1329 Pain 8/10. PRN oxycodone 5 mg PO pain medication administered at this time. Patient has been educated on side effects. Side effect education sheets have been provided.

## 2018-10-02 NOTE — PROGRESS NOTES
Problem: Mobility Impaired (Adult and Pediatric)  Goal: *Acute Goals and Plan of Care (Insert Text)  In 1-7 days pt will be able to perform:  ST.  Bed mobility:  Rolling L to R to L modified independent for positioning. 2.  Supine to sit to supine S with HR for meals. 3.  Sit to stand to sit S with RW in prep for ambulation. LT.  Gait:  Ambulate >150ft S with RW, WBAT, for home/community mobility. 2.  Stair Negotiation:  Ascend/descend >5 steps CGA with HR for home entry. 3.  Activity tolerance: Tolerate up in chair 1-2 hours for ADLs. 4.  Patient/Family Education:  Patient/family to be independent with HEP for follow-up care and safe discharge. Outcome: Resolved/Met Date Met: 10/02/18  physical Therapy TREATMENT/DISCHARGE    Patient: Zay Crystal (37 y.o. male)  Date: 10/2/2018  Diagnosis: UNILATERAL PRIMARY OSTEOARTHRITIS, RIGHT KNEE  Osteoarthritis of right knee Osteoarthritis of right knee  Procedure(s) (LRB):  RIGHT KNEE MEDIAL UNICONDYLAR KNEE REPLACEMENT VERSUS TOTAL KNEE REPLACEMENT (Right) 1 Day Post-Op  Precautions: Fall, WBAT  Chart, physical therapy assessment, plan of care and goals were reviewed. ASSESSMENT:  Pt seen in supine prior to session w/ B/L SCDs donned. Pt reported 7/10 pain in R knee but reports already receiving pain meds. Pt has met all goals at this time. Pt educated about RW as the AD the pt has is not appropriate nor safe, per pt him and his wife will purchase a RW at the store upon d/c. Pt able to perform some therex activity w/ min Vcing. Pt able to gait 150 ft w/ RW/GB w/ no signs of LOB. Pt able to perform step negotiation using B/L HRs, pt initially buckled in the R knee but when given VCing for TKE of the R knee pt able to perform task w/ no buckling at this time. Pt transferred back to room where he was left in supine, call bell and tray in reach, nurse notified after session. Pt has met all goals at this time, DC from acute PT.    Progression toward goals:  [x]      Goals met  []      Improving appropriately and progressing toward goals  []      Improving slowly and progressing toward goals  []      Not making progress toward goals and plan of care will be adjusted     PLAN:  Patient will be discharged from physical therapy at this time. Rationale for discharge:  [x] Goals Achieved  [] [address]  [] Patient not participating in therapy  [] Other:  Discharge Recommendations:  Home Health  Further Equipment Recommendations for Discharge:  rolling walker     SUBJECTIVE:   Patient stated I feel okay.     OBJECTIVE DATA SUMMARY:   Critical Behavior:  Neurologic State: Alert, Appropriate for age  Orientation Level: Oriented X4  Cognition: Appropriate decision making, Appropriate for age attention/concentration, Appropriate safety awareness  Safety/Judgement: Awareness of environment  Functional Mobility Training:  Bed Mobility:  Supine to Sit: Supervision  Sit to Supine: Supervision  Scooting: Supervision  Transfers:  Sit to Stand: Supervision  Stand to Sit: Supervision   Balance:  Sitting: Intact  Standing: Intact; With support  Ambulation/Gait Training:  Distance (ft): 150 Feet (ft)  Assistive Device: Gait belt;Walker, rolling  Ambulation - Level of Assistance: Supervision  Gait Abnormalities: Antalgic;Decreased step clearance; Step to gait  Right Side Weight Bearing: As tolerated  Base of Support: Shift to left  Stance: Right decreased  Speed/Jennifer: Slow  Step Length: Left shortened;Right shortened  Swing Pattern: Left asymmetrical;Right asymmetrical  Interventions: Safety awareness training; Tactile cues; Verbal cues  Stairs:  Number of Stairs Trained: 10  Stairs - Level of Assistance: Contact guard assistance   Rail Use: Both  Therapeutic Exercises:   R LE LAQs x10, Quad sets x 10 (hold for 5 secs)  Pain:  Pain Scale 1: Numeric (0 - 10)  Pain Intensity 1: 7  Pain Location 1: Knee  Pain Orientation 1: Right  Pain Description 1: Aching  Pain Intervention(s) 1: Medication (see MAR)  Activity Tolerance:   Good  Please refer to the flowsheet for vital signs taken during this treatment. After treatment:   [] Patient left in no apparent distress sitting up in chair  [x] Patient left in no apparent distress in bed  [x] Call bell left within reach  [x] Nursing notified  [] Caregiver present  [] Bed alarm activated  Troy Birch, PT   Time Calculation: 15 mins   Mobility:   Goal  CI= 1-19%  D/C  CI= 1-19%. The severity rating is based on the Other Based on functional assessment

## 2018-10-02 NOTE — ROUTINE PROCESS
Dual AVS reviewed with Narda Walker RN. All medications reviewed individually with patient. Opportunities for questions and concerns provided. Patient discharged via (mode of transport ie. Car, ambulance or air transport) car. Patient's arm band appropriately discarded.

## 2018-10-02 NOTE — PROGRESS NOTES
Transition of Care (STEVE) Plan:     Chart reviewed, met with pt in room. Pt planning discharge home, has spouse available to assist. [facility] offered, pt chose [facility] [phone] for follow up; referral placed with CMS. Pt will purchase RW for home. STEVE Transportation:   How is patient being transported at discharge? Family/Friend      When? Once cleared by Therapy between 12-2pm     Is transport scheduled? N/A      Follow-up appointment and transportation:   PCP/Specialist?  See AVS for Appointment         Who is transporting to the follow-up appointment? Family/Friend      Is transport for follow up appointment scheduled? N/A    Communication plan (with patient/family): Who is being called? Patient or Next of Kin? Responsible party? Patient      What number(s) is to be used? See Facesheet      What service provider is calling for [facility] services? When are they calling? 24-48 hours following discharge    Readmission Risk? (Green/Low; Yellow/Moderate; Red/High):  Green    Care Management Interventions  PCP Verified by CM:  Yes  Transition of Care Consult (CM Consult): [address]: Yes  Discharge Durable Medical Equipment: No  Physical Therapy Consult: Yes  Occupational Therapy Consult: Yes  Current Support Network: Lives with Spouse, Own Home  Confirm Follow Up Transport: Family  Plan discussed with Pt/Family/Caregiver: Yes  Freedom of Choice Offered: Yes  Discharge Location  Discharge Placement: Home with home health

## 2018-10-03 ENCOUNTER — HOME CARE VISIT (OUTPATIENT)
Dept: SCHEDULING | Facility: HOME HEALTH | Age: 38
End: 2018-10-03
Payer: COMMERCIAL

## 2018-10-03 VITALS
OXYGEN SATURATION: 93 % | RESPIRATION RATE: 16 BRPM | DIASTOLIC BLOOD PRESSURE: 59 MMHG | SYSTOLIC BLOOD PRESSURE: 96 MMHG | TEMPERATURE: 98.5 F | HEART RATE: 92 BPM

## 2018-10-03 PROCEDURE — G0299 HHS/HOSPICE OF RN EA 15 MIN: HCPCS

## 2018-10-03 PROCEDURE — G0151 HHCP-SERV OF PT,EA 15 MIN: HCPCS

## 2018-10-03 PROCEDURE — 400013 HH SOC

## 2018-10-04 ENCOUNTER — HOME CARE VISIT (OUTPATIENT)
Dept: SCHEDULING | Facility: HOME HEALTH | Age: 38
End: 2018-10-04
Payer: COMMERCIAL

## 2018-10-04 ENCOUNTER — HOME CARE VISIT (OUTPATIENT)
Dept: HOME HEALTH SERVICES | Facility: HOME HEALTH | Age: 38
End: 2018-10-04
Payer: COMMERCIAL

## 2018-10-04 PROCEDURE — G0157 HHC PT ASSISTANT EA 15: HCPCS

## 2018-10-04 PROCEDURE — A6213 FOAM DRG >16<=48 SQ IN W/BDR: HCPCS

## 2018-10-05 VITALS
TEMPERATURE: 99.1 F | HEART RATE: 88 BPM | SYSTOLIC BLOOD PRESSURE: 96 MMHG | OXYGEN SATURATION: 95 % | DIASTOLIC BLOOD PRESSURE: 68 MMHG

## 2018-10-06 ENCOUNTER — HOME CARE VISIT (OUTPATIENT)
Dept: SCHEDULING | Facility: HOME HEALTH | Age: 38
End: 2018-10-06
Payer: COMMERCIAL

## 2018-10-06 PROCEDURE — G0299 HHS/HOSPICE OF RN EA 15 MIN: HCPCS

## 2018-10-06 PROCEDURE — G0157 HHC PT ASSISTANT EA 15: HCPCS

## 2018-10-07 VITALS
TEMPERATURE: 98.5 F | SYSTOLIC BLOOD PRESSURE: 118 MMHG | OXYGEN SATURATION: 93 % | DIASTOLIC BLOOD PRESSURE: 74 MMHG | HEART RATE: 87 BPM

## 2018-10-08 ENCOUNTER — HOME CARE VISIT (OUTPATIENT)
Dept: SCHEDULING | Facility: HOME HEALTH | Age: 38
End: 2018-10-08
Payer: COMMERCIAL

## 2018-10-08 VITALS
OXYGEN SATURATION: 93 % | DIASTOLIC BLOOD PRESSURE: 74 MMHG | TEMPERATURE: 98.6 F | HEART RATE: 90 BPM | SYSTOLIC BLOOD PRESSURE: 120 MMHG

## 2018-10-08 PROCEDURE — G0157 HHC PT ASSISTANT EA 15: HCPCS

## 2018-10-09 VITALS
HEART RATE: 80 BPM | RESPIRATION RATE: 18 BRPM | OXYGEN SATURATION: 98 % | DIASTOLIC BLOOD PRESSURE: 70 MMHG | SYSTOLIC BLOOD PRESSURE: 100 MMHG | TEMPERATURE: 97.8 F

## 2018-10-10 ENCOUNTER — HOME CARE VISIT (OUTPATIENT)
Dept: SCHEDULING | Facility: HOME HEALTH | Age: 38
End: 2018-10-10
Payer: COMMERCIAL

## 2018-10-10 VITALS
DIASTOLIC BLOOD PRESSURE: 72 MMHG | SYSTOLIC BLOOD PRESSURE: 116 MMHG | TEMPERATURE: 98.7 F | HEART RATE: 88 BPM | RESPIRATION RATE: 16 BRPM | OXYGEN SATURATION: 99 %

## 2018-10-10 PROCEDURE — G0157 HHC PT ASSISTANT EA 15: HCPCS

## 2018-10-10 PROCEDURE — G0299 HHS/HOSPICE OF RN EA 15 MIN: HCPCS

## 2018-10-11 ENCOUNTER — HOME CARE VISIT (OUTPATIENT)
Dept: SCHEDULING | Facility: HOME HEALTH | Age: 38
End: 2018-10-11
Payer: COMMERCIAL

## 2018-10-11 VITALS
DIASTOLIC BLOOD PRESSURE: 64 MMHG | SYSTOLIC BLOOD PRESSURE: 118 MMHG | OXYGEN SATURATION: 94 % | TEMPERATURE: 98.3 F | HEART RATE: 77 BPM

## 2018-10-11 VITALS
OXYGEN SATURATION: 93 % | HEART RATE: 96 BPM | SYSTOLIC BLOOD PRESSURE: 116 MMHG | DIASTOLIC BLOOD PRESSURE: 62 MMHG | TEMPERATURE: 97.6 F

## 2018-10-11 PROCEDURE — G0157 HHC PT ASSISTANT EA 15: HCPCS

## 2018-10-13 ENCOUNTER — HOME CARE VISIT (OUTPATIENT)
Dept: SCHEDULING | Facility: HOME HEALTH | Age: 38
End: 2018-10-13
Payer: COMMERCIAL

## 2018-10-13 PROCEDURE — G0162 HHC RN E&M PLAN SVS, 15 MIN: HCPCS

## 2018-10-14 VITALS
OXYGEN SATURATION: 97 % | SYSTOLIC BLOOD PRESSURE: 120 MMHG | RESPIRATION RATE: 18 BRPM | HEART RATE: 82 BPM | TEMPERATURE: 97.9 F | DIASTOLIC BLOOD PRESSURE: 71 MMHG

## 2018-10-22 ENCOUNTER — HOSPITAL ENCOUNTER (OUTPATIENT)
Dept: PHYSICAL THERAPY | Age: 38
Discharge: HOME | End: 2018-10-22
Payer: COMMERCIAL

## 2018-10-22 DIAGNOSIS — F98.8 ATTENTION DEFICIT DISORDER, UNSPECIFIED HYPERACTIVITY PRESENCE: ICD-10-CM

## 2018-10-22 PROCEDURE — 97162 PT EVAL MOD COMPLEX 30 MIN: CPT

## 2018-10-22 PROCEDURE — 97116 GAIT TRAINING THERAPY: CPT

## 2018-10-22 PROCEDURE — 97110 THERAPEUTIC EXERCISES: CPT

## 2018-10-22 RX ORDER — DEXTROAMPHETAMINE SACCHARATE, AMPHETAMINE ASPARTATE, DEXTROAMPHETAMINE SULFATE AND AMPHETAMINE SULFATE 7.5; 7.5; 7.5; 7.5 MG/1; MG/1; MG/1; MG/1
30 TABLET ORAL 2 TIMES DAILY
Qty: 60 TAB | Refills: 0 | Status: SHIPPED | OUTPATIENT
Start: 2018-10-22 | End: 2018-11-16 | Stop reason: SDUPTHER

## 2018-10-22 NOTE — PROGRESS NOTES
In [address]  [address] [place], [address] 
[phone] fax Plan of Care/ Statement of Necessity for Physical Therapy Services Patient name: Allegra Paris Start of Care: 10/22/2018 Referral source: Akua Cahrles MD : 1980 Medical Diagnosis: Pain in right knee [M25.561] Onset Date:10/01/2018 Treatment Diagnosis: s/p right partial knee replacement Prior Hospitalization: see medical history Provider#: [id number] Medications: Verified on Patient summary List  
 Comorbidities: BMI > 30, HTN, right partial knee replacement, arthritis, depression Prior Level of Function: The patient states that he was able to ambulate but with pain limiting stair negotiation as well. He states that he did have instances of buckling as well. The Plan of Care and following information is based on the information from the initial evaluation. Assessment/ key information: The patient is a 34year old male s/p right partial knee replacement performed on 10/01/2018. He denies post operative complications indicating he utilized  for 1st week and progressed to [facility] the week after that and now ambulates void of AD> he participated in about 2 weeks of home health. The patient has impairments related to the procedure consisting of pain, decreased ROM, antalgia, decreased strength, limited stair negotiation, and decreased transfer efficiency. The patient will benefit from skilled PT in order to address the aforementioned impairments. Evaluation Complexity History MEDIUM  Complexity : 1-2 comorbidities / personal factors will impact the outcome/ POC ; Examination LOW Complexity : 1-2 Standardized tests and measures addressing body structure, function, activity limitation and / or participation in recreation  ;Presentation LOW Complexity : Stable, uncomplicated  ;Clinical Decision Making MEDIUM Complexity : FOTO score of 26-74 Overall Complexity Rating: MEDIUM Problem List: pain affecting function, decrease ROM, decrease strength, edema affecting function, impaired gait/ balance, decrease ADL/ functional abilitiies, decrease activity tolerance, decrease flexibility/ joint mobility and decrease transfer abilities Treatment Plan may include any combination of the following: Therapeutic exercise, Therapeutic activities, Neuromuscular re-education, Physical agent/modality, Gait/balance training, Manual therapy, Patient education, Self Care training, Functional mobility training, Home safety training and Stair training Patient / Family readiness to learn indicated by: asking questions, trying to perform skills and interest 
Persons(s) to be included in education: patient (P) Barriers to Learning/Limitations: None Patient Goal (s): complete mobility Patient Self Reported Health Status: good Rehabilitation Potential: good Short Term Goals: To be accomplished in 2 weeks: 1. The patient will be independent and compliant with HEP to maximize therapeutic benefit. 2. The patient will improve right knee extension to 0 degrees to maximize ambulation efficiency. Long Term Goals: To be accomplished in 4 weeks: 1. The patient will improve right knee flexion to 125 degrees to improve ease of stair negotiation. 2. The patient will improve FOTO score to 57 to maximize quality of life. 3. The patient will improve quad strength to 5/5 MMT right knee to maximize stability in stance. 4. The patient will display reciprocal stair negotiation to maximize stair negotiation ease. Frequency / Duration: Patient to be seen 2-3 times per week for 4 weeks. Patient/ Caregiver education and instruction: Diagnosis, prognosis, self care, activity modification and exercises  
 
 [x]  Plan of care has been reviewed with SUHAS Nunez PT 10/22/2018 9:36 AM 
 
________________________________________________________________________ I certify that the above Therapy Services are being furnished while the patient is under my care. I agree with the treatment plan and certify that this therapy is necessary. [de-identified] Signature:____________Date:_________TIME:________ 
 
[employer], Date and Time must be completed for valid certification ** Please sign and return to In [address] [place], [address] 
[phone] fax

## 2018-10-22 NOTE — PROGRESS NOTES
PT DAILY TREATMENT NOTE     Patient Name: Jeffrey Garcia  Date:10/22/2018  : 1980  [x]  Patient  Verified  Payor: Carlota Crane / Plan: [facility] / Product Type: Commerical /    In time:9:02  Out time:9:38  Total Treatment Time (min): 36  Visit #: 1 of 10    Treatment Area: Pain in right knee [M25.561]    SUBJECTIVE  Pain Level (0-10 scale): 10  Any medication changes, allergies to medications, adverse drug reactions, diagnosis change, or new procedure performed?: [x] No    [] Yes (see summary sheet for update)  Subjective functional status/changes:   [] No changes reported  The patient states that he has a chief complaint of difficulty getting up from chairs, and walking up stairs with an alternating pattern. OBJECTIVE   18 min [x]Eval                  []Re-Eval       10 min Therapeutic Exercise:  [x] See flow sheet :   Rationale: increase ROM and increase strength to improve the patients ability to improve ADL ease. 8 min Gait Training: Educated patient regarding progression of ambulation and stair negotiation programs following reduction of compensations, as well as stance phase normalization cues. Rationale: To maximize ambulation ease.            With   [] TE   [] TA   [] neuro   [] other: Patient Education: [x] Review HEP    [] Progressed/Changed HEP based on:   [] positioning   [] body mechanics   [] transfers   [] heat/ice application    [] other:      Other Objective/Functional Measures: See IE     Pain Level (0-10 scale) post treatment: 2/10    ASSESSMENT/Changes in Function: See POC    Patient will continue to benefit from skilled PT services to modify and progress therapeutic interventions, address functional mobility deficits, address ROM deficits, address strength deficits, analyze and address soft tissue restrictions, analyze and cue movement patterns, analyze and modify body mechanics/ergonomics, assess and modify postural abnormalities and instruct in home and community integration to attain remaining goals. [x]  See Plan of Care  []  See progress note/recertification  []  See Discharge Summary         Progress towards goals / Updated goals:  Short Term Goals: To be accomplished in 2 weeks:   1. The patient will be independent and compliant with HEP to maximize therapeutic benefit. 2. The patient will improve right knee extension to 0 degrees to maximize ambulation efficiency. Long Term Goals: To be accomplished in 4 weeks:   1. The patient will improve right knee flexion to 125 degrees to improve ease of stair negotiation. 2. The patient will improve FOTO score to 57 to maximize quality of life. 3. The patient will improve quad strength to 5/5 MMT right knee to maximize stability in stance. 4. The patient will display reciprocal stair negotiation to maximize stair negotiation ease.        PLAN  []  Upgrade activities as tolerated     [x]  Continue plan of care  []  Update interventions per flow sheet       []  Discharge due to:_  []  Other:_      Jennie Nice, PT 10/22/2018  9:42 AM    Future Appointments   Date Time Provider Deandre Kirk   10/26/2018  7:30 AM Rebeca Ordaz, PT [facility]   10/30/2018  7:00 AM Rebeca Ordaz, PT [facility]   10/31/2018  7:30 AM Ghada Bustillos, PTA [facility]   11/2/2018  7:30 AM Ghada Bustillos, PTA [facility]   11/6/2018  7:30 AM Ghada Bustillos, PTA [facility]   11/8/2018  7:30 AM Pradeep Dobbins, PTA [facility]   11/13/2018  7:00 AM Jena Benson, PT [facility]   11/15/2018  7:30 AM Roscale Dobbins, PTA [facility]   11/20/2018  7:00 AM Jena Benson, PT [facility]   12/17/2018 10:40 AM Isidro Nathan MD [facility]

## 2018-10-26 ENCOUNTER — HOSPITAL ENCOUNTER (OUTPATIENT)
Dept: PHYSICAL THERAPY | Age: 38
Discharge: HOME | End: 2018-10-26
Payer: COMMERCIAL

## 2018-10-26 PROCEDURE — 97110 THERAPEUTIC EXERCISES: CPT

## 2018-10-26 PROCEDURE — 97140 MANUAL THERAPY 1/> REGIONS: CPT

## 2018-10-26 PROCEDURE — 97016 VASOPNEUMATIC DEVICE THERAPY: CPT

## 2018-10-26 NOTE — PROGRESS NOTES
PT DAILY TREATMENT NOTE     Patient Name: Katt Browne  Date:10/26/2018  : 1980  [x]  Patient  Verified  Payor: John Ruiz / Plan: [facility] [employer]  [facility] / Product Type: Commerical /    In time:7:45  Out time:8:38  Total Treatment Time (min): 48  Visit #: 2 of 10    Treatment Area: Pain in right knee [M25.561]    SUBJECTIVE  Pain Level (0-10 scale): 3/10  Any medication changes, allergies to medications, adverse drug reactions, diagnosis change, or new procedure performed?: [x] No    [] Yes (see summary sheet for update)  Subjective functional status/changes:   [] No changes reported  \"My knee is sore around the incision. It feels like it is opening up even though I know it's not. \" The patient does report compliance with HEP up to this point. OBJECTIVE  Modality rationale: decrease edema, decrease inflammation and decrease pain to improve the patients ability to improve ADL ease. Type Additional Details   [x]  Vasopneumatic Device   [] Skin assessment post-treatment:  []intact []redness- no adverse reaction    []redness - adverse reaction:       35 min Therapeutic Exercise:  [x] See flow sheet :   Rationale: increase ROM and increase strength to improve the patients ability to improve ADL ease. 8 min Manual Therapy:  PROM left knee extension/flexion mobs. Rationale: decrease pain, increase ROM and increase tissue extensibility to improve ADL ease. With   [] TE   [] TA   [] neuro   [] other: Patient Education: [] Review HEP    [] Progressed/Changed HEP based on:   [] positioning   [] body mechanics   [] transfers   [] heat/ice application    [] other:      Other Objective/Functional Measures:   Passive ROM  Knee flexion: 115 degrees  Knee extension: 0 degrees     Pain Level (0-10 scale) post treatment: 1/10    ASSESSMENT/Changes in Function: Notable scab remains over proximal incision with small redness appreciated around it.  Marked this win writing implement upon outer perimeter in order to track this redness. Good progress with progressing ROM and not significant worsening of pain appreciated. However, advised the patient to watch his knee over the weekend. With worsening pain or increasing redness, advised him to get in to see MD as they may want to issue antibotics. Patient will continue to benefit from skilled PT services to modify and progress therapeutic interventions, address functional mobility deficits, address ROM deficits, address strength deficits, analyze and address soft tissue restrictions, analyze and cue movement patterns, analyze and modify body mechanics/ergonomics, assess and modify postural abnormalities and instruct in home and community integration to attain remaining goals. []  See Plan of Care  []  See progress note/recertification  []  See Discharge Summary         Progress towards goals / Updated goals:  Short Term Goals: To be accomplished in 2 weeks:              1. The patient will be independent and compliant with HEP to maximize therapeutic benefit. Met reports compliance 10/26/56503              2. The patient will improve right knee extension to 0 degrees to maximize ambulation efficiency. Long Term Goals: To be accomplished in 4 weeks:              1. The patient will improve right knee flexion to 125 degrees to improve ease of stair negotiation. 2. The patient will improve FOTO score to 57 to maximize quality of life. 3. The patient will improve quad strength to 5/5 MMT right knee to maximize stability in stance. 4. The patient will display reciprocal stair negotiation to maximize stair negotiation ease.      PLAN  [x]  Upgrade activities as tolerated     [x]  Continue plan of care  []  Update interventions per flow sheet       []  Discharge due to:_  []  Other:_      Arelis [place], PT 10/26/2018  7:39 AM    Future Appointments   Date Time Provider Deandre Kirk   10/30/2018  7:00 AM Orlin Jaja Pham, PT [facility]   10/31/2018  7:30 AM Rito Essex, PTA [facility]   11/2/2018  7:30 AM Rito Essex, PTA [facility]   11/6/2018  7:30 AM Rito Essex, PTA [facility]   11/8/2018  7:30 AM Stephen Cutting, PTA [facility]   11/13/2018  7:00 AM Henok Floras, PT [facility]   11/15/2018  7:30 AM Stephen Cutting, PTA [facility]   11/20/2018  7:00 AM Henok Floras, PT [facility]   12/17/2018 10:40 AM Dottie Roth MD [facility]

## 2018-10-29 NOTE — TELEPHONE ENCOUNTER
Patient has refills at [facility] but wants to start getting his prescriptions at [facility].

## 2018-10-29 NOTE — TELEPHONE ENCOUNTER
Last Visit: 09/17/2018 with MD Brianna Virgen    Next Appointment: 12/17/2018 with MD Brianna Virgen     Requested Prescriptions     Pending Prescriptions Disp Refills    losartan (COZAAR) 50 mg tablet 90 Tab 3     Sig: Take 1 Tab by mouth daily.

## 2018-10-30 ENCOUNTER — HOSPITAL ENCOUNTER (OUTPATIENT)
Dept: PHYSICAL THERAPY | Age: 38
Discharge: HOME | End: 2018-10-30
Payer: COMMERCIAL

## 2018-10-30 PROCEDURE — 97140 MANUAL THERAPY 1/> REGIONS: CPT

## 2018-10-30 PROCEDURE — 97016 VASOPNEUMATIC DEVICE THERAPY: CPT

## 2018-10-30 PROCEDURE — 97110 THERAPEUTIC EXERCISES: CPT

## 2018-10-30 RX ORDER — LOSARTAN POTASSIUM 50 MG/1
50 TABLET ORAL DAILY
Qty: 90 TAB | Refills: 3 | Status: SHIPPED | OUTPATIENT
Start: 2018-10-30 | End: 2019-11-01 | Stop reason: SDUPTHER

## 2018-10-30 NOTE — PROGRESS NOTES
PT DAILY TREATMENT NOTE 12-    Patient Name: Nathaly Leblanc  Date:10/30/2018  : 1980  [x]  Patient  Verified  Payor: Edwin Millard / [place] [place] [employer]  [facility] / Product Type: Commerical /    In time:7:02  Out time:7:54  Total Treatment Time (min): 52  Visit #: 3 of 8    Treatment Area: Pain in right knee [M25.561]    SUBJECTIVE  Pain Level (0-10 scale): 1/10  Any medication changes, allergies to medications, adverse drug reactions, diagnosis change, or new procedure performed?: [x] No    [] Yes (see summary sheet for update)  Subjective functional status/changes:   [] No changes reported  The patient indicates that his knee is feeling better. He does indicate that the redness extended beyond the pen line and continues to feel most limited with this area of soreness. OBJECTIVE  Modality rationale: decrease edema, decrease inflammation and decrease pain to improve the patients ability to improve ADL ease. Min Type Additional Details   10 [x]  Vasopneumatic Device Pressure:       [] lo [] med [] hi   Temperature: [] lo [] med [] hi   [] Skin assessment post-treatment:  []intact []redness- no adverse reaction    []redness - adverse reaction:     26 min Therapeutic Exercise:  [x] See flow sheet :   Rationale: increase ROM and increase strength to improve the patients ability to improve ADL ease. 8 min Manual Therapy:  PROM right knee flexion mobs, patellar mobs. Rationale: decrease pain, increase ROM and increase tissue extensibility to improve ADL ease. 8 min Gait Training: Jesús negotiation as it pertains to cycles of gait. Rationale: Utilized small hurdles form stance phase and swing phase emphasis.           With   [] TE   [] TA   [] neuro   [] other: Patient Education: [x] Review HEP    [] Progressed/Changed HEP based on:   [] positioning   [] body mechanics   [] transfers   [] heat/ice application    [] other:      Other Objective/Functional Measures:   Right Knee ROM: 0-122 degrees    Proximal incision redness 15 mm medial to lateral 20 cm superior to inferior. Pain Level (0-10 scale) post treatment: 2/10    ASSESSMENT/Changes in Function: Due to redness extending beyond pen markings with chief complaint of redness region, recommended the patient call MD office today with patient verbalizing understanding. All other objective measures improving today,     Patient will continue to benefit from skilled PT services to modify and progress therapeutic interventions, address functional mobility deficits, address ROM deficits, address strength deficits, analyze and address soft tissue restrictions, analyze and cue movement patterns, analyze and modify body mechanics/ergonomics, assess and modify postural abnormalities and instruct in home and community integration to attain remaining goals. []  See Plan of Care  []  See progress note/recertification  []  See Discharge Summary         Progress towards goals / Updated goals:  Short Term Goals: To be accomplished in 2 weeks:              5. The patient will be independent and compliant with HEP to maximize therapeutic benefit. Met reports compliance 10/26/97821              2. The patient will improve right knee extension to 0 degrees to maximize ambulation efficiency. Met 0 degrees 10/30/2018  Long Term Goals: To be accomplished in 4 weeks:              1. The patient will improve right knee flexion to 125 degrees to improve ease of stair negotiation. Nearly met 122 10/30/2018              2. The patient will improve FOTO score to 57 to maximize quality of life.              3. The patient will improve quad strength to 5/5 MMT right knee to maximize stability in stance.              4. The patient will display reciprocal stair negotiation to maximize stair negotiation ease.      PLAN  []  Upgrade activities as tolerated     [x]  Continue plan of care  []  Update interventions per flow sheet       []  Discharge due to:_  []  Other:_ Nahum Moreno, PT 10/30/2018  7:09 AM    Future Appointments   Date Time Provider Deandre Yelena   10/31/2018  7:30 AM Jesus Manuel Bravo, PTA [facility]   11/2/2018  7:30 AM Jesus Manuel Bravo, PTA [facility]   11/6/2018  7:30 AM Jesus Manuel Bravo PTA [facility]   11/8/2018  7:30 AM Celso Gonzalez PTA [facility]   11/13/2018  7:00 AM Josr Alejandre, PT [facility]   11/15/2018  7:30 AM Celso Gonzalez PTA [facility]   11/20/2018  7:00 AM Josr Alejandre, PT [facility]   12/17/2018 10:40 AM Julius David MD [facility]

## 2018-10-31 ENCOUNTER — HOSPITAL ENCOUNTER (OUTPATIENT)
Dept: PHYSICAL THERAPY | Age: 38
Discharge: HOME | End: 2018-10-31
Payer: COMMERCIAL

## 2018-10-31 PROCEDURE — 97016 VASOPNEUMATIC DEVICE THERAPY: CPT

## 2018-10-31 PROCEDURE — 97110 THERAPEUTIC EXERCISES: CPT

## 2018-10-31 NOTE — PROGRESS NOTES
PT DAILY TREATMENT NOTE 10-18    Patient Name: Brice Galvan  Date:10/31/2018  : 1980  [x]  Patient  Verified  Payor: Natalie Alonso / Plan: [place] [employer]  CAPITATED PT / Product Type: Commerical /    In time:7:32  Out time:8:20  Total Treatment Time (min): 48  Visit #: 4 of 8    Treatment Area: Pain in right knee [M25.561]    SUBJECTIVE  Pain Level (0-10 scale): 2/10  Any medication changes, allergies to medications, adverse drug reactions, diagnosis change, or new procedure performed?: [x] No    [] Yes (see summary sheet for update)  Subjective functional status/changes:   [] No changes reported  Pt reports he contacted his referring physician regarding incision site redness. OBJECTIVE    Modality rationale: decrease inflammation and decrease pain to improve the patients ability to tolerate ADLs.     Min Type Additional Details    [] Estim:  []Unatt       []IFC  []Premod                        []Other:  []w/ice   []w/heat  Position:  Location:    [] Estim: []Att    []TENS instruct  []NMES                    []Other:  []w/US   []w/ice   []w/heat  Position:  Location:    []  Traction: [] Cervical       []Lumbar                       [] Prone          []Supine                       []Intermittent   []Continuous Lbs:  [] before manual  [] after manual    []  Ultrasound: []Continuous   [] Pulsed                           []1MHz   []3MHz W/cm2:  Location:    []  Iontophoresis with dexamethasone         Location: [] Take home patch   [] In clinic    []  Ice     []  heat  []  Ice massage  []  Laser   []  Anodyne Position:  Location:    []  Laser with stim  []  Other:  Position:  Location:   10 []  Vasopneumatic Device Pressure:       [x] lo [] med [] hi   Temperature: [x] lo [] med [] hi   [] Skin assessment post-treatment:  []intact []redness- no adverse reaction    []redness - adverse reaction:       38 min Therapeutic Exercise:  [x] See flow sheet :   Rationale: increase ROM and increase strength to improve the patients ability to tolerate ADLs. With   [] TE   [] TA   [] neuro   [] other: Patient Education: [x] Review HEP    [] Progressed/Changed HEP based on:   [] positioning   [] body mechanics   [] transfers   [] heat/ice application    [] other:      Other Objective/Functional Measures: Right knee AROM 1-0-120 degrees. No change in incision redness since last visit. Pain Level (0-10 scale) post treatment: 2/10    ASSESSMENT/Changes in Function: Pt demonstrates good right knee mobility and strength. Patient will continue to benefit from skilled PT services to modify and progress therapeutic interventions, address functional mobility deficits, address ROM deficits, address strength deficits, analyze and address soft tissue restrictions and analyze and cue movement patterns to attain remaining goals. []  See Plan of Care  []  See progress note/recertification  []  See Discharge Summary         Progress towards goals / Updated goals:  Short Term Goals: To be accomplished in 2 weeks:              1. The patient will be independent and compliant with HEP to maximize therapeutic benefit. Met reports compliance 10/26/07058              2. The patient will improve right knee extension to 0 degrees to maximize ambulation efficiency. Met 0 degrees 10/30/2018  Long Term Goals: To be accomplished in 4 weeks:              1. The patient will improve right knee flexion to 125 degrees to improve ease of stair negotiation. Nearly met 122 10/30/2018              2. The patient will improve FOTO score to 57 to maximize quality of life.              3. The patient will improve quad strength to 5/5 MMT right knee to maximize stability in stance.              4.  The patient will display reciprocal stair negotiation to maximize stair negotiation ease.          PLAN  []  Upgrade activities as tolerated     [x]  Continue plan of care  []  Update interventions per flow sheet       []  Discharge due to:_  []  Other:_ Luis Thorpe, PTA 10/31/2018  7:52 AM    Future Appointments   Date Time Provider Deandre Yelena   11/2/2018  7:30 AM Susie Favor, PTA [facility]   11/6/2018  7:30 AM Susie Favor, PTA [facility]   11/8/2018  7:30 AM Everet Abt, PTA [facility]   11/13/2018  7:00 AM Areli Schmidt, PT [facility]   11/15/2018  7:30 AM Everet Abt, PTA [facility]   11/20/2018  7:00 AM Areli Schmidt, PT [facility]   12/17/2018 10:40 AM Matilda Obrien MD [facility]

## 2018-11-02 ENCOUNTER — HOSPITAL ENCOUNTER (OUTPATIENT)
Dept: PHYSICAL THERAPY | Age: 38
Discharge: HOME | End: 2018-11-02
Payer: COMMERCIAL

## 2018-11-02 PROCEDURE — 97110 THERAPEUTIC EXERCISES: CPT

## 2018-11-02 PROCEDURE — 97016 VASOPNEUMATIC DEVICE THERAPY: CPT

## 2018-11-02 NOTE — PROGRESS NOTES
PT DAILY TREATMENT NOTE 10-18    Patient Name: Irma Valdes  Date:2018  : 1980  [x]  Patient  Verified  Payor: Nyasia Argueta / Plan: [place] [employer]  CAPITATED PT / Product Type: Commerical /    In time:7:30  Out time:8:12  Total Treatment Time (min): 42  Visit #: 5 of 8    Treatment Area: Pain in right knee [M25.561]    SUBJECTIVE  Pain Level (0-10 scale): 1/10  Any medication changes, allergies to medications, adverse drug reactions, diagnosis change, or new procedure performed?: [x] No    [] Yes (see summary sheet for update)  Subjective functional status/changes:   [] No changes reported  Pt denies any pain. OBJECTIVE    Modality rationale: decrease inflammation and decrease pain to improve the patients ability to tolerate ADLs. Min Type Additional Details    [] Estim:  []Unatt       []IFC  []Premod                        []Other:  []w/ice   []w/heat  Position:  Location:    [] Estim: []Att    []TENS instruct  []NMES                    []Other:  []w/US   []w/ice   []w/heat  Position:  Location:    []  Traction: [] Cervical       []Lumbar                       [] Prone          []Supine                       []Intermittent   []Continuous Lbs:  [] before manual  [] after manual    []  Ultrasound: []Continuous   [] Pulsed                           []1MHz   []3MHz W/cm2:  Location:    []  Iontophoresis with dexamethasone         Location: [] Take home patch   [] In clinic    []  Ice     []  heat  []  Ice massage  []  Laser   []  Anodyne Position:  Location:    []  Laser with stim  []  Other:  Position:  Location:   10 [x]  Vasopneumatic Device Pressure:       [x] lo [] med [] hi   Temperature: [x] lo [] med [] hi   [] Skin assessment post-treatment:  []intact []redness- no adverse reaction    []redness - adverse reaction:     32 min Therapeutic Exercise:  [x] See flow sheet :   Rationale: increase ROM and increase strength to improve the patients ability to tolerate ADLs.            With   [] TE   [] TA   [] neuro   [] other: Patient Education: [x] Review HEP    [] Progressed/Changed HEP based on:   [] positioning   [] body mechanics   [] transfers   [] heat/ice application    [] other:      Other Objective/Functional Measures: slight increase in redness at superior aspect of incision; approx 20 milimeters medial to lateral.  Increased yellow oozing at superior aspect of incision. Pain Level (0-10 scale) post treatment: 1/10    ASSESSMENT/Changes in Function: Pt continues to demonstrate improved right knee mobility with near full knee flexion. Instructed patient to follow up with referring physician due to increase in redness and yellow oozing at superior aspect of incision site. Patient will continue to benefit from skilled PT services to modify and progress therapeutic interventions, address functional mobility deficits, address ROM deficits, address strength deficits, analyze and address soft tissue restrictions, analyze and cue movement patterns and analyze and modify body mechanics/ergonomics to attain remaining goals. []  See Plan of Care  []  See progress note/recertification  []  See Discharge Summary         Progress towards goals / Updated goals:  Short Term Goals: To be accomplished in 2 weeks:              3. The patient will be independent and compliant with HEP to maximize therapeutic benefit. Met reports compliance 10/26/57703              2. The patient will improve right knee extension to 0 degrees to maximize ambulation efficiency. Met 0 degrees 10/30/2018  Long Term Goals: To be accomplished in 4 weeks:              1. The patient will improve right knee flexion to 125 degrees to improve ease of stair negotiation. Nearly met 122 10/30/2018              2. The patient will improve FOTO score to 57 to maximize quality of life.              3. The patient will improve quad strength to 5/5 MMT right knee to maximize stability in stance.              4.  The patient will display reciprocal stair negotiation to maximize stair negotiation ease.         PLAN  []  Upgrade activities as tolerated     [x]  Continue plan of care  []  Update interventions per flow sheet       []  Discharge due to:_  []  Other:_      Pratik Milian, SUHAS 11/2/2018  7:45 AM    Future Appointments   Date Time Provider Deandre Kirk   11/6/2018  7:30 AM [facility]   11/8/2018  7:30 AM [facility]   11/13/2018  7:00 AM [facility] Holiday[facility]   11/15/2018  7:30 AM [facility]in [facility]auhan[facility]   11/20/2018  7:00 AM [facility] Holiday[facility]   12/6/2018 10:00 AM [facility]   12/17/2018 10:40 AM Wilene Harada, MD [facility]

## 2018-11-06 ENCOUNTER — HOSPITAL ENCOUNTER (OUTPATIENT)
Dept: PHYSICAL THERAPY | Age: 38
Discharge: HOME | End: 2018-11-06
Payer: COMMERCIAL

## 2018-11-06 PROCEDURE — 97140 MANUAL THERAPY 1/> REGIONS: CPT

## 2018-11-06 PROCEDURE — 97110 THERAPEUTIC EXERCISES: CPT

## 2018-11-06 NOTE — PROGRESS NOTES
PT DAILY TREATMENT NOTE 10-18    Patient Name: Binu Israel  Date:2018  : 1980  [x]  Patient  Verified  Payor: Reza Pollock / Plan: [place] [employer]  CAPITATED PT / Product Type: Commerical /    In time:7:30  Out time:8:10  Total Treatment Time (min): 40  Visit #: 6 of 8    Treatment Area: Pain in right knee [M25.561]    SUBJECTIVE  Pain Level (0-10 scale): 0/10  Any medication changes, allergies to medications, adverse drug reactions, diagnosis change, or new procedure performed?: [x] No    [] Yes (see summary sheet for update)  Subjective functional status/changes:   [] No changes reported  Pt denies any complaints of pain. Pt reports compliance with HEP. OBJECTIVE    Modality rationale: decrease inflammation and decrease pain to improve the patients ability to tolerate ADLs.     Min Type Additional Details    [] Estim:  []Unatt       []IFC  []Premod                        []Other:  []w/ice   []w/heat  Position:  Location:    [] Estim: []Att    []TENS instruct  []NMES                    []Other:  []w/US   []w/ice   []w/heat  Position:  Location:    []  Traction: [] Cervical       []Lumbar                       [] Prone          []Supine                       []Intermittent   []Continuous Lbs:  [] before manual  [] after manual    []  Ultrasound: []Continuous   [] Pulsed                           []1MHz   []3MHz W/cm2:  Location:    []  Iontophoresis with dexamethasone         Location: [] Take home patch   [] In clinic    []  Ice     []  heat  []  Ice massage  []  Laser   []  Anodyne Position:  Location:    []  Laser with stim  []  Other:  Position:  Location:   10 [x]  Vasopneumatic Device Pressure:       [x] lo [] med [] hi   Temperature: [x] lo [] med [] hi   [] Skin assessment post-treatment:  []intact []redness- no adverse reaction    []redness - adverse reaction:     22 min Therapeutic Exercise:  [x] See flow sheet :   Rationale: increase ROM and increase strength to improve the patients ability to tolerate ADLs. 8 min Neuromuscular Re-education:  []  See flow sheet :   Rationale: increase strength, improve coordination and increase proprioception  to improve the patients ability to perform functional activities. With   [] TE   [] TA   [] neuro   [] other: Patient Education: [x] Review HEP    [] Progressed/Changed HEP based on:   [] positioning   [] body mechanics   [] transfers   [] heat/ice application    [] other:      Other Objective/Functional Measures: Added hip x3 and shuttle press. Pain Level (0-10 scale) post treatment: 0/10    ASSESSMENT/Changes in Function: Pt demonstrated good quad control with eccentric step downs. Patient will continue to benefit from skilled PT services to modify and progress therapeutic interventions, address functional mobility deficits, address ROM deficits, address strength deficits, analyze and address soft tissue restrictions, analyze and cue movement patterns and analyze and modify body mechanics/ergonomics to attain remaining goals. []  See Plan of Care  []  See progress note/recertification  []  See Discharge Summary         Progress towards goals / Updated goals:  Short Term Goals: To be accomplished in 2 weeks:              9. The patient will be independent and compliant with HEP to maximize therapeutic benefit. Met reports compliance 10/26/11682              2. The patient will improve right knee extension to 0 degrees to maximize ambulation efficiency. Met 0 degrees 10/30/2018  Long Term Goals: To be accomplished in 4 weeks:              1. The patient will improve right knee flexion to 125 degrees to improve ease of stair negotiation. Nearly met 122 10/30/2018              2. The patient will improve FOTO score to 57 to maximize quality of life.              3. The patient will improve quad strength to 5/5 MMT right knee to maximize stability in stance.              4.  The patient will display reciprocal stair negotiation to maximize stair negotiation ease.         PLAN  []  Upgrade activities as tolerated     [x]  Continue plan of care  []  Update interventions per flow sheet       []  Discharge due to:_  []  Other:_      Adia Walter, SUHAS 11/6/2018  7:33 AM    Future Appointments   Date Time Provider Deandre Kirk   11/8/2018  7:30 AM [facility]   11/13/2018  7:00 AM Jerilyn Horta, PTA [facility]   11/15/2018  7:30 AM Kurt Carlson PTA [facility]   11/20/2018  7:00 AM Nidia Sparrow, PT [facility]   12/6/2018 10:00 AM [facility]   12/17/2018 10:40 AM Nayla Magallon MD [facility]

## 2018-11-08 ENCOUNTER — HOSPITAL ENCOUNTER (OUTPATIENT)
Dept: PHYSICAL THERAPY | Age: 38
Discharge: HOME | End: 2018-11-08
Payer: COMMERCIAL

## 2018-11-08 PROCEDURE — 97016 VASOPNEUMATIC DEVICE THERAPY: CPT

## 2018-11-08 PROCEDURE — 97110 THERAPEUTIC EXERCISES: CPT

## 2018-11-08 NOTE — PROGRESS NOTES
PT DAILY TREATMENT NOTE     Patient Name: Sylvia Nearing  Date:2018  : 1980  [x]  Patient  Verified  Payor: Ladd Crigler / Plan: [employer]  CAPITATED PT / Product Type: Commerical /    In time:7:30  Out time:8:13  Total Treatment Time (min): 43  Visit #: 7 of 8    Treatment Area: Pain in right knee [M25.561]    SUBJECTIVE  Pain Level (0-10 scale): 0/10  Any medication changes, allergies to medications, adverse drug reactions, diagnosis change, or new procedure performed?: [x] No    [] Yes (see summary sheet for update)  Subjective functional status/changes:   [x] No changes reported    OBJECTIVE    Modality rationale: decrease inflammation and decrease pain to improve the patients ability to perform ADL's. Modalities for 10'.    Type Additional Details   [] Estim:  []Unatt       []IFC  []Premod                        []Other:  []w/ice   []w/heat  Position:  Location:   [] Estim: []Att    []TENS instruct  []NMES                    []Other:  []w/US   []w/ice   []w/heat  Position:  Location:   []  Traction: [] Cervical       []Lumbar                       [] Prone          []Supine                       []Intermittent   []Continuous Lbs:  [] before manual  [] after manual   []  Ultrasound: []Continuous   [] Pulsed                           []1MHz   []3MHz W/cm2:  Location:   []  Iontophoresis with dexamethasone         Location: [] Take home patch   [] In clinic   []  Ice     []  heat  []  Ice massage  []  Laser   []  Anodyne Position:  Location:   []  Laser with stim  []  Other:  Position:  Location:   [x]  Vasopneumatic Device Pressure:       [x] lo [] med [] hi   Temperature: [x] lo [] med [] hi   [] Skin assessment post-treatment:  []intact []redness- no adverse reaction    []redness - adverse reaction:     33 min Therapeutic Exercise:  [x] See flow sheet :   Rationale: increase ROM, increase strength and increase proprioception to improve the patients ability to perform functional activities. With   [x] TE   [] TA   [] neuro   [] other: Patient Education: [x] Review HEP    [] Progressed/Changed HEP based on:   [] positioning   [] body mechanics   [] transfers   [] heat/ice application    [] other:      Other Objective/Functional Measures: Increased step ups to 20 reps each. MMT right quads 4/5. Pain Level (0-10 scale) post treatment: 0/10    ASSESSMENT/Changes in Function: Mild compensatory Left foot push off during Right LE step ups. Strength progressing gradually. Slight antalgic gait, may be partially habitual and/or Right foot pain. Patient will continue to benefit from skilled PT services to modify and progress therapeutic interventions, address functional mobility deficits, address ROM deficits, address strength deficits, analyze and address soft tissue restrictions and analyze and cue movement patterns to attain remaining goals. [x]  See Plan of Care  []  See progress note/recertification  []  See Discharge Summary         Progress towards goals / Updated goals:  Short Term Goals: To be accomplished in 2 weeks:              2. The patient will be independent and compliant with HEP to maximize therapeutic benefit. Met reports compliance 10/26/27391              2. The patient will improve right knee extension to 0 degrees to maximize ambulation efficiency. Met 0 degrees 10/30/2018  Long Term Goals: To be accomplished in 4 weeks:              1. The patient will improve right knee flexion to 125 degrees to improve ease of stair negotiation. Nearly met 122 10/30/2018              2. The patient will improve FOTO score to 57 to maximize quality of life. - FOTO 55%. 11/2/2018              3. The patient will improve quad strength to 5/5 MMT right knee to maximize stability in stance. - MMT right quads 4/5. 11/8/2018              4.  The patient will display reciprocal stair negotiation to maximize stair negotiation ease.     PLAN  []  Upgrade activities as tolerated     [x] Continue plan of care  []  Update interventions per flow sheet       []  Discharge due to:_  []  Other:_      Isidra Castañeda, PTA 11/8/2018  7:26 AM    Future Appointments   Date Time Provider Deandre Kirk   11/8/2018  7:30 AM Alta Dexter PTA [facility]   11/13/2018  7:00 AM Altagracia Connor PTA [facility]   11/15/2018  7:30 AM Alta Dexter PTA [facility]   11/20/2018  7:00 AM Joan Gasca, PT [facility]   12/6/2018 10[facility]   12/17/2018 10:40 AM Karen Quiñones MD [facility]

## 2018-11-15 ENCOUNTER — HOSPITAL ENCOUNTER (OUTPATIENT)
Dept: PHYSICAL THERAPY | Age: 38
Discharge: HOME | End: 2018-11-15
Payer: COMMERCIAL

## 2018-11-15 PROCEDURE — 97110 THERAPEUTIC EXERCISES: CPT

## 2018-11-15 PROCEDURE — 97016 VASOPNEUMATIC DEVICE THERAPY: CPT

## 2018-11-15 NOTE — PROGRESS NOTES
PT DAILY TREATMENT NOTE     Patient Name: Tomás Garcia  Date:11/15/2018  : 1980  [x]  Patient  Verified  Payor: Riky Connelly / Plan: [facility]  CAPITATED PT / Product Type: Commerical /    In time:7:30  Out time:8:15  Total Treatment Time (min): 45  Visit #: 8 of     Treatment Area: Pain in right knee [M25.561]    SUBJECTIVE  Pain Level (0-10 scale): 0/10  Any medication changes, allergies to medications, adverse drug reactions, diagnosis change, or new procedure performed?: [x] No    [] Yes (see summary sheet for update)  Subjective functional status/changes:   [] No changes reported  \"Feels pretty good. \"    OBJECTIVE    Modality rationale: decrease inflammation and decrease pain to improve the patients ability to perform ADL's.    Min Type Additional Details    [] Estim:  []Unatt       []IFC  []Premod                        []Other:  []w/ice   []w/heat  Position:  Location:    [] Estim: []Att    []TENS instruct  []NMES                    []Other:  []w/US   []w/ice   []w/heat  Position:  Location:    []  Traction: [] Cervical       []Lumbar                       [] Prone          []Supine                       []Intermittent   []Continuous Lbs:  [] before manual  [] after manual    []  Ultrasound: []Continuous   [] Pulsed                           []1MHz   []3MHz Location:  W/cm2:    []  Iontophoresis with dexamethasone         Location: [] Take home patch   [] In clinic    []  Ice     []  heat  []  Ice massage  []  Laser   []  Anodyne Position:  Location:    []  Laser with stim  []  Other: Position:  Location:   10 [x]  Vasopneumatic Device Pressure:       [x] lo [] med [] hi   Temperature: [x] lo [] med [] hi   [] Skin assessment post-treatment:  []intact []redness- no adverse reaction    []redness - adverse reaction:     35 min Therapeutic Exercise:  [x] See flow sheet :   Rationale: increase ROM, increase strength and increase proprioception to improve the patients ability to perform functional activities. With   [x] TE   [] TA   [] neuro   [] other: Patient Education: [x] Review HEP    [] Progressed/Changed HEP based on:   [] positioning   [] body mechanics   [] transfers   [] heat/ice application    [] other:      Other Objective/Functional Measures: Increased LAQ/SAQ to 3#. Increased SLR's to 2 sets of 10 reps. Pain Level (0-10 scale) post treatment: 0/10    ASSESSMENT/Changes in Function: Slight difficulty performing 2\" eccentric step downs, unable to perform 4\". Patient will continue to benefit from skilled PT services to modify and progress therapeutic interventions, address functional mobility deficits, address strength deficits, analyze and cue movement patterns and analyze and modify body mechanics/ergonomics to attain remaining goals. [x]  See Plan of Care  []  See progress note/recertification  []  See Discharge Summary         Progress towards goals / Updated goals:  Short Term Goals: To be accomplished in 2 weeks:              6. The patient will be independent and compliant with HEP to maximize therapeutic benefit. Met reports compliance 10/26/04883              2. The patient will improve right knee extension to 0 degrees to maximize ambulation efficiency. Met 0 degrees 10/30/2018  Long Term Goals: To be accomplished in 4 weeks:              1. The patient will improve right knee flexion to 125 degrees to improve ease of stair negotiation. Nearly met 122 10/30/2018              2. The patient will improve FOTO score to 57 to maximize quality of life. - FOTO 55%. 11/2/2018              3. The patient will improve quad strength to 5/5 MMT right knee to maximize stability in stance. - MMT right quads 4/5. 11/8/2018              4.  The patient will display reciprocal stair negotiation to maximize stair negotiation ease.     PLAN  []  Upgrade activities as tolerated     [x]  Continue plan of care  []  Update interventions per flow sheet       []  Discharge due to:_  []  Other:_ Tosin Case, PTA 11/15/2018  7:23 AM    Future Appointments   Date Time Provider Deandre Epsteini   11/15/2018  7:30 AM Esperanza Canales, PTA [facility]   11/20/2018  7:00 AM Cherylene Nest, PT [facility]   12/6/2018 10:00 AM [facility] ORIENTATION [facility]   12/17/2018 10:40 AM Leobardo Olivares MD [facility]

## 2018-11-16 DIAGNOSIS — F98.8 ATTENTION DEFICIT DISORDER, UNSPECIFIED HYPERACTIVITY PRESENCE: ICD-10-CM

## 2018-11-16 RX ORDER — DEXTROAMPHETAMINE SACCHARATE, AMPHETAMINE ASPARTATE, DEXTROAMPHETAMINE SULFATE AND AMPHETAMINE SULFATE 7.5; 7.5; 7.5; 7.5 MG/1; MG/1; MG/1; MG/1
30 TABLET ORAL 2 TIMES DAILY
Qty: 60 TAB | Refills: 0 | Status: SHIPPED | OUTPATIENT
Start: 2018-11-16 | End: 2018-12-11 | Stop reason: SDUPTHER

## 2018-11-16 NOTE — TELEPHONE ENCOUNTER
[facility] as [facility]23/2018. There appears to be no inconsistencies in regards to the prescribing of this medication. Last Visit: 09/17/2018 with MD Jose Ramirez  Next Appointment: 12/17/2018 with MD Jose Ramirez  Previous Refill Encounter(s): 10/22/2018 per MD Jose Ramirez #[id number]    Requested Prescriptions     Pending Prescriptions Disp Refills    dextroamphetamine-amphetamine (ADDERALL) 30 mg tablet 60 Tab 0     Sig: Take 1 Tab by mouth two (2) times a dayEarliest Fill Date: 11/16/18.

## 2018-11-20 ENCOUNTER — HOSPITAL ENCOUNTER (OUTPATIENT)
Dept: PHYSICAL THERAPY | Age: 38
Discharge: HOME | End: 2018-11-20
Payer: COMMERCIAL

## 2018-11-20 PROCEDURE — 97016 VASOPNEUMATIC DEVICE THERAPY: CPT

## 2018-11-20 PROCEDURE — 97110 THERAPEUTIC EXERCISES: CPT

## 2018-11-20 NOTE — PROGRESS NOTES
PT DAILY TREATMENT NOTE     Patient Name: Neo Michel  Date:2018  : 1980  [x]  Patient  Verified  Payor: Jodie Pineda / Plan: [facility] [employer]  CAPITATED PT / Product Type: Commerical /    In time:7:00  Out time:7:46  Total Treatment Time (min): 46  Visit #: 9 of     Treatment Area: Pain in right knee [M25.561]    SUBJECTIVE  Pain Level (0-10 scale): 1/10  Any medication changes, allergies to medications, adverse drug reactions, diagnosis change, or new procedure performed?: [x] No    [] Yes (see summary sheet for update)  Subjective functional status/changes:   [] No changes reported  The patient indicates that his knee has been feeling good, feet are more sore than anything. OBJECTIVE  Modality rationale: decrease edema, decrease inflammation and decrease pain to improve the patients ability to improve ADL ease.     Min Type Additional Details    [] Estim:  []Unatt       []IFC  []Premod                        []Other:  []w/ice   []w/heat  Position:  Location:    [] Estim: []Att    []TENS instruct  []NMES                    []Other:  []w/US   []w/ice   []w/heat  Position:  Location:    []  Traction: [] Cervical       []Lumbar                       [] Prone          []Supine                       []Intermittent   []Continuous Lbs:  [] before manual  [] after manual    []  Ultrasound: []Continuous   [] Pulsed                           []1MHz   []3MHz Location:  W/cm2:    []  Iontophoresis with dexamethasone         Location: [] Take home patch   [] In clinic    []  Ice     []  heat  []  Ice massage  []  Laser   []  Anodyne Position:  Location:    []  Laser with stim  []  Other: Position:  Location:   10 [x]  Vasopneumatic Device Pressure:       [x] lo [] med [] hi   Temperature: [x] lo [] med [] hi   [] Skin assessment post-treatment:  []intact []redness- no adverse reaction    []redness - adverse reaction:      36 min Therapeutic Exercise:  [] See flow sheet :   Rationale: increase ROM and increase strength to improve the patients ability to improve ADL ease. With   [] TE   [] TA   [] neuro   [] other: Patient Education: [x] Review HEP    [] Progressed/Changed HEP based on:   [] positioning   [] body mechanics   [] transfers   [] heat/ice application    [] other:      Other Objective/Functional Measures:   Knee ROM: 127 degrees flexion  FOTO: 61  Stair negotiation, reciprocal gait  Quad strength: 5/5 MMT     Pain Level (0-10 scale) post treatment: 0/10    ASSESSMENT/Changes in Function: The patient has met all goals and been discharged to continue HEP. All questions answered. []  See Plan of Care  []  See progress note/recertification  [x]  See Discharge Summary         Progress towards goals / Updated goals:  Short Term Goals: To be accomplished in 2 weeks:              6. The patient will be independent and compliant with HEP to maximize therapeutic benefit. Met reports compliance 10/26/85983              2. The patient will improve right knee extension to 0 degrees to maximize ambulation efficiency. Met 0 degrees 10/30/2018  Long Term Goals: To be accomplished in 4 weeks:              1. The patient will improve right knee flexion to 125 degrees to improve ease of stair negotiation. Nearly met 122 10/30/2018; Met 127 degrees 2018              2. The patient will improve FOTO score to 57 to maximize quality of life. - FOTO 55%. 2018 ; Met 61 2018              3. The patient will improve quad strength to 5/5 MMT right knee to maximize stability in stance. - MMT right quads 4/5. 2018; Met 5/5 MMT 2018              4.  The patient will display reciprocal stair negotiation to maximize stair negotiation ease. Met able 2018        PLAN  []  Upgrade activities as tolerated     []  Continue plan of care  []  Update interventions per flow sheet       [x]  Discharge due to:_  []  Other:_      Hiro Conklin, PT 2018  8:08 AM    Future Appointments Date Time Provider Deandre Kirk   12/6/2018 10:00 AM [facility] ORIENTATION [facility]   12/17/2018 10:40 AM David Bryan MD [facility]

## 2018-11-20 NOTE — PROGRESS NOTES
[facility]  [address] [facility] [address]  [phone] fax    Physical Therapy Discharge Summary    Patient name: Allegra Paris Start of Care: 10/22/2018   Referral source: Akua Charles MD : 1980                Medical Diagnosis: Pain in right knee [M25.561]    Onset Date:10/01/2018                Treatment Diagnosis: s/p right partial knee replacement   Prior Hospitalization: see medical history Provider#: [id number]   Medications: Verified on Patient summary List    Comorbidities: BMI > 30, HTN, right partial knee replacement, arthritis, depression   Prior Level of Function: The patient states that he was able to ambulate but with pain limiting stair negotiation as well. He states that he did have instances of buckling as well. Visits from Start of Care: 9    Missed Visits: 1  Reporting Period : 10/22/2018 to 2018      Summary of Care:  Short Term Goals: To be accomplished in 2 weeks:              1. The patient will be independent and compliant with HEP to maximize therapeutic benefit. Met reports compliance 10/26/87558              2. The patient will improve right knee extension to 0 degrees to maximize ambulation efficiency. Met 0 degrees 10/30/2018  Long Term Goals: To be accomplished in 4 weeks:              1. The patient will improve right knee flexion to 125 degrees to improve ease of stair negotiation. Nearly met 122 10/30/2018; Met 127 degrees 2018              2. The patient will improve FOTO score to 57 to maximize quality of life. - FOTO 55%. 2018 ; Met 61 2018              3. The patient will improve quad strength to 5/5 MMT right knee to maximize stability in stance. - MMT right quads 4/5. 2018; Met 5/5 MMT 2018              4.  The patient will display reciprocal stair negotiation to maximize stair negotiation ease. Met able 2018     Knee ROM: 127 degrees flexion  FOTO: 64  Stair negotiation, reciprocal gait  Quad strength: 5/5 MMT      ASSESSMENT/RECOMMENDATIONS: The patient has met all goals and been discharged to continue HEP. All questions answered.     [x]Discontinue therapy: [x]Patient has reached or is progressing toward set goals      []Patient is non-compliant or has abdicated      []Due to lack of appreciable progress towards set [address], PT 11/20/2018 9:15 AM

## 2018-12-06 ENCOUNTER — CLINICAL SUPPORT (OUTPATIENT)
Dept: FAMILY MEDICINE CLINIC | Age: 38
End: 2018-12-06

## 2018-12-06 DIAGNOSIS — E66.9 OBESITY, UNSPECIFIED CLASSIFICATION, UNSPECIFIED OBESITY TYPE, UNSPECIFIED WHETHER SERIOUS COMORBIDITY PRESENT: Primary | ICD-10-CM

## 2018-12-11 DIAGNOSIS — F98.8 ATTENTION DEFICIT DISORDER, UNSPECIFIED HYPERACTIVITY PRESENCE: ICD-10-CM

## 2018-12-11 RX ORDER — DEXTROAMPHETAMINE SACCHARATE, AMPHETAMINE ASPARTATE, DEXTROAMPHETAMINE SULFATE AND AMPHETAMINE SULFATE 7.5; 7.5; 7.5; 7.5 MG/1; MG/1; MG/1; MG/1
30 TABLET ORAL 2 TIMES DAILY
Qty: 60 TAB | Refills: 0 | Status: SHIPPED | OUTPATIENT
Start: 2018-12-11 | End: 2019-01-11 | Stop reason: SDUPTHER

## 2018-12-11 NOTE — TELEPHONE ENCOUNTER
[facility] as 11/19/2018. There appears to be no inconsistencies in regards to the prescribing of this medication. Last Visit: 09/17/2018 with MD Jose Ramirez  Next Appointment: 12/17/2018 with MD Jose Ramirez  Previous Refill Encounter(s): 11/16/2018 per MD Jose Ramirez #60    Requested Prescriptions     Pending Prescriptions Disp Refills    dextroamphetamine-amphetamine (ADDERALL) 30 mg tablet 60 Tab 0     Sig: Take 1 Tab by mouth two (2) times a dayEarliest Fill Date: 12/11/18.

## 2018-12-14 DIAGNOSIS — E66.01 MORBID OBESITY WITH BMI OF 40.0-44.9, ADULT (HCC): Primary | ICD-10-CM

## 2018-12-17 ENCOUNTER — TELEPHONE (OUTPATIENT)
Dept: INTERNAL MEDICINE CLINIC | Age: 38
End: 2018-12-17

## 2018-12-19 ENCOUNTER — HOSPITAL ENCOUNTER (OUTPATIENT)
Dept: LAB | Age: 38
Discharge: HOME | End: 2018-12-19

## 2018-12-19 ENCOUNTER — HOSPITAL ENCOUNTER (OUTPATIENT)
Dept: LAB | Age: 38
Discharge: HOME | End: 2018-12-19
Payer: COMMERCIAL

## 2018-12-19 DIAGNOSIS — E66.01 MORBID OBESITY WITH BMI OF 40.0-44.9, ADULT (HCC): ICD-10-CM

## 2018-12-19 LAB
ATRIAL RATE: 91 BPM
CALCULATED P AXIS, ECG09: 17 DEGREES
CALCULATED R AXIS, ECG10: -10 DEGREES
CALCULATED T AXIS, ECG11: 22 DEGREES
DIAGNOSIS, 93000: NORMAL
P-R INTERVAL, ECG05: 160 MS
Q-T INTERVAL, ECG07: 356 MS
QRS DURATION, ECG06: 88 MS
QTC CALCULATION (BEZET), ECG08: 437 MS
SENTARA SPECIMEN COL,SENBCF: NORMAL
VENTRICULAR RATE, ECG03: 91 BPM

## 2018-12-19 PROCEDURE — 99001 SPECIMEN HANDLING PT-LAB: CPT

## 2018-12-19 PROCEDURE — 93005 ELECTROCARDIOGRAM TRACING: CPT

## 2019-01-07 ENCOUNTER — OFFICE VISIT (OUTPATIENT)
Dept: SURGERY | Age: 39
End: 2019-01-07

## 2019-01-07 VITALS
WEIGHT: 288.6 LBS | BODY MASS INDEX: 43.74 KG/M2 | DIASTOLIC BLOOD PRESSURE: 66 MMHG | HEART RATE: 101 BPM | HEIGHT: 68 IN | SYSTOLIC BLOOD PRESSURE: 112 MMHG | TEMPERATURE: 98.1 F | RESPIRATION RATE: 18 BRPM

## 2019-01-07 DIAGNOSIS — E66.01 MORBID OBESITY WITH BMI OF 40.0-44.9, ADULT (HCC): Primary | ICD-10-CM

## 2019-01-07 NOTE — PATIENT INSTRUCTIONS
1.  Constipation        -around 1 out of 5 chance it happens at all       -around 1 out of 10 chance you'll need to take something (see below)    It typically can be prevented by Drinking at least 8 glasses of water a day    If you're prone to constipation:  - Take a Stool Softener every day:  (eg - Dulcolax Stool Softener or colace 100 mg)  - Eat Plenty of Fiber   Keep your fiber intake to at least 20 grams a day   Use SUGAR-FREE products: Fiber One products, Benefiber or Metamucil    If you get constipated:  1. Start with a Stool Softener (produces a bowel movement in 72 hr):   Examples:    Colace 100mg by mouth BID    Dulcolax Stool Softener 100 mg    2. If you still have constipation after 2 or 3 days, add a Stimulant Laxative to the Stool Softener (this will produce a bowel movement in 6 - 12 hr):   Examples:    Exlax (1 small square) for up to 4 days    Dulcolax stimulant laxative    Miralax 1 capful twice a day (It's OK to go up to 3 caps for a few days)     Do not exceed 3 days of Miralax, it can be dependency forming. Magnesium citrate pill 500 mg start with once a day and go up to 3 times    a day if needed    3. Or you can go straight to a combination Stool Softner / Stimulant at night. If you need, you can add a morning dose. Examples:    Pericolace 50 mg / 8.25 mg    Sennakot-S  50 mg / 8.25 mg    4.   If You're Really locked up, get Liquid Magnesium Citrate (take according to the      Directions)

## 2019-01-07 NOTE — PROGRESS NOTES
Initial Consultation for Weight Loss    Sho Goldman is a 34 y.o. male who comes into the office today for initial consultation for the options for the treatment of obesity. The patient initially identified obesity at the age of adolescence and at age 23 weighed 0 lbs. He has tried a variety of unsupervised weight-loss attempts including self imposed and Weight Watchers, Hx gastric bypass 2010, but has yet to meet with lasting success. Maximum weight lost on a diet is about 201 lbs with gastric bypass surgery, but that the weight loss always seems to return. Today, the patient is  Height: 5' 8\" (172.7 cm) tall, Weight: 130.9 kg (288 lb 9.6 oz) lbs for a Body mass index is 43.88 kg/m². It is due to the patient's obesity, which is further complicated by hypertension and hypercholesterolemia  that the patient is now seeking out medically supervised VLCD.       Ideal body weight: 68.4 kg (150 lb 12.7 oz)  Adjusted ideal body weight: 93.4 kg (205 lb 14.7 oz) (Based on [employer] Tables)  Goal Weight: 195       Wt Readings from Last 10 Encounters:   01/07/19 130.9 kg (288 lb 9.6 oz)   10/01/18 135.2 kg (298 lb)   09/17/18 135.6 kg (299 lb)   09/13/18 131.5 kg (290 lb)   08/01/18 134.3 kg (296 lb)   05/17/18 129.7 kg (286 lb)   05/03/18 133.2 kg (293 lb 9.6 oz)   01/02/18 127.9 kg (282 lb)   09/26/17 129.1 kg (284 lb 9.6 oz)   06/19/17 125.5 kg (276 lb 9.6 oz)       Weight Metrics 1/7/2019 1/7/2019 10/1/2018 9/17/2018 9/13/2018 8/1/2018 5/17/2018   Weight - 288 lb 9.6 oz 298 lb 299 lb 290 lb 296 lb 286 lb   Waist Measure Inches 56.75 - - - - - -   BMI - 43.88 kg/m2 45.31 kg/m2 45.46 kg/m2 44.09 kg/m2 45.01 kg/m2 43.49 kg/m2           History of binge eating: no    History of purging: no    Major lifestyle changes: no   Other commitments: no   Any potential unsupportive: no     Has Sincere Acharya ever been told by a physician not to exercise: no    Does Sincere Acharya know of any reason they shouldn't exercise: no  If yes, why?  N/A    Does Adrianna Baugh have any food allergies or sensitivities: no      MWL questionnaire reviewed. If female:  No LMP for male patient. Past Medical History:   Diagnosis Date    ADD  10/11    Allergic rhinitis     Arthritis     Dr Cal Hobbs; right knee    Asthma     as a child    Chronic back pain     Colon polyp     f/u colo age 46 per Dr Ermelinda Tapia from 2014    Depression     FHx: heart disease     GERD (gastroesophageal reflux disease)     Hepatic steatosis     HTN (hypertension) 2010    resolved after gastric bypass    Obesity     s/p gastric bypass 5/10 Dr. Cristian Weinberg BMI 54, peak weight 367; elvis 195 lbs; failed qsymia 2017-18; IF 9/18 start weight 299 lbs    FRANCISCA on CPAP     resolved after wt loss    Osteoarthritis of right knee 9/30/2018    Primary hypertension 3/28/2017    Stomach ulcer 2002    Dr. Ermelinda Tapia    Ulcerative proctitis [facility])        Past Surgical History:   Procedure Laterality Date    BIOPSY LIVER      Dr Rock Cowden  03/2017    echo nl lv, ef 60%, mild aortic root dilation    HX BUNIONECTOMY      Dr Garcia Arm; saw [id number] then Dr Nataliia Mandel later    HX CHOLECYSTECTOMY  9/08    Dr Gracie Jones      2002 negative, last 2005 proctitis Dr Nunn Port  3/10    Lap Cherri-en-Y BMI 47 Dr Richa Salmon preop weight 367 lbs    HX ORTHOPAEDIC Left     left hip Dr Martha Andres age 13    HX ORTHOPAEDIC Right 10/2018    partial knee replacement        Current Outpatient Medications   Medication Sig Dispense Refill    dextroamphetamine-amphetamine (ADDERALL) 30 mg tablet Take 1 Tab by mouth two (2) times a dayEarliest Fill Date: 12/11/18. 60 Tab 0    losartan (COZAAR) 50 mg tablet Take 1 Tab by mouth daily. 90 Tab 3    albuterol (PROVENTIL HFA, VENTOLIN HFA, PROAIR HFA) 90 mcg/actuation inhaler Take 2 Puffs by inhalation every four (4) hours as needed for Wheezing or Shortness of Breath.       escitalopram oxalate (LEXAPRO) 20 mg tablet Take 20 mg by mouth daily.  buPROPion XL (WELLBUTRIN XL) 150 mg tablet Take 150 mg by mouth every morning.  cyanocobalamin (VITAMIN B-12) 1,000 mcg sublingual tablet Take 1,000 mcg by mouth daily.  valACYclovir (VALTREX) 500 mg tablet Take 1 Tab by mouth two (2) times a day. (Patient taking differently: Take 1 Tab by mouth as needed (Herpetic flare ups). ) 60 Tab 11    cloNIDine HCl (CATAPRES) 0.1 mg tablet Take 0.1 mg by mouth nightly.  traZODone (DESYREL) 50 mg tablet Take 50 mg by mouth nightly.  busPIRone (BUSPAR) 10 mg tablet Take 10 mg by mouth two (2) times a day.          Allergies   Allergen Reactions    Lisinopril Cough       Social History     Tobacco Use    Smoking status: Former Smoker    Smokeless tobacco: Current User     Types: Chew    Tobacco comment: advised to hold all tobacco 24 hours prior to surgery   Substance Use Topics    Alcohol use: No    Drug use: No       Family History   Problem Relation Age of Onset    Cancer Maternal Grandmother         colon    Stroke Maternal Grandfather     Diabetes Paternal Grandfather     Heart Disease Paternal Grandfather     Cancer Paternal Grandfather         colon    Arthritis-osteo Mother     Heart Disease Mother     Depression Mother     Hypertension Father     Depression Father     Elevated Lipids Father        Family Status   Relation Name Status    MGM      MGF      PGF      Mother      Father  Alive    Other CHD-GP,DM/Colon Ca GM Alive       Review of Systems:   ROS    Positive in BOLD  CONST: Fever, weight loss, fatigue or chills  GI: Nausea, vomiting, abdominal pain, change in bowel habits, hematochezia, melena, and GERD   INTEG: Dermatitis, abnormal moles  HEENT: Recent changes in vision, vertigo, epistaxis, dysphagia and hoarseness  CV: Chest pain, palpitations, HTN, edema and varicosities  RESP: Cough, shortness of breath, wheezing, hemoptysis, snoring and reactive airway disease  : Hematuria, dysuria, frequency, urgency, nocturia and stress urinary incontinence   MS: Weakness, joint pain- right knee and arthritis- bilateral hands   ENDO: Diabetes, thyroid disease, polyuria, polydipsia, polyphagia, poor wound healing, heat intolerance, cold intolerance  LYMPH/HEME: Anemia, bruising and history of blood transfusions  NEURO: Dizziness, headache, fainting, seizures and stroke  PSYCH: Anxiety and depression      Physical Exam    Visit Vitals  /66   Pulse (!) 101   Temp 98.1 °F (36.7 °C)   Resp 18   Ht 5' 8\" (1.727 m)   Wt 130.9 kg (288 lb 9.6 oz)   BMI 43.88 kg/m²           Physical Exam    General: 34 y.o.) male in no acute distress. HEENT: Normocephalic, atraumatic, Pupils equal and reactive, nasopharynx clear, oropharynx clear and moist without lesions  NECK: Supple, no lymphadenopathy, thyromegaly, carotid bruits or jugular venous distension. trachea midline  RESP: Clear to auscultation bilaterally, no wheezes, rhonchi, or rales, normal respiratory excursion  CV: Regular rate and rhythm, no murmurs, rubs or gallops. 3+/4 pulses in bilateral dorsalis pedis and posterior tibialis. No distal edema or varicosities. ABD: Soft, nontender, nondistended, normoactive bowel sounds, no hernias, no hepatosplenomegaly  Neuro: Sensation and strength grossly intact and symmetrical  Psych: Alert and oriented to person, place, and time. Lab results reviewed. For significant abnormal values and values requiring intervention, see assessment and plan. Assessment/Plan  Bre Butler is a 34 y.o. male who is suffering from obesity with a BMI of 43.88  and comorbidities including hypertension and hypercholesterolemia  who would benefit from weight loss. Patient with previous history of gastric bypass in 2010- patient not taking multi vitamin or calcium- discussed importance of taking MV and calcium.  Patient also mentioned he discontinued Pepsi about 5 weeks ago- discussed not consuming carbonated beverages with patient due to previous gastric bypass history. Patient noted to have history of FRANCISCA prior to gastric bypass surgery. Patient reported snoring with weight gain. Referral to Pulmonology Rx'd. Diet regimen   # of meal replacements prescribed: 4       Monthly Goal   10-15    Medical monitoring schedule:   Weekly BP/Weight checks   Monthly provider appointments              Monthly CMP, uric acid checks      I have reviewed/discussed the above normal BMI with the patient. I have recommended the following interventions: dietary management education, guidance, and counseling, monitor weight and prescribed dietary intake .          HOLLY Ndiaye-BC

## 2019-01-10 NOTE — TELEPHONE ENCOUNTER
Per Mariluz Vincent we will give pt one additional chance because the last note says he needs to see in March. 2019. Not sure if the December one should have been cancelled.     Appt scheduled 3/2019

## 2019-01-11 DIAGNOSIS — F98.8 ATTENTION DEFICIT DISORDER, UNSPECIFIED HYPERACTIVITY PRESENCE: ICD-10-CM

## 2019-01-11 NOTE — TELEPHONE ENCOUNTER
[facility]18 as qty:60 for a 30 day supply at [address] . There appears to be no inconsistencies in regards to the prescribing of this medication. Last Visit: 12/17  Next Appt: 2/4  Previous Refill Encounter: 12/11-60+0    Requested Prescriptions     Pending Prescriptions Disp Refills    dextroamphetamine-amphetamine (ADDERALL) 30 mg tablet 60 Tab 0     Sig: Take 1 Tab by mouth two (2) times a dayEarliest Fill Date: 1/11/19.

## 2019-01-14 ENCOUNTER — TELEPHONE (OUTPATIENT)
Dept: INTERNAL MEDICINE CLINIC | Age: 39
End: 2019-01-14

## 2019-01-14 RX ORDER — DEXTROAMPHETAMINE SACCHARATE, AMPHETAMINE ASPARTATE, DEXTROAMPHETAMINE SULFATE AND AMPHETAMINE SULFATE 7.5; 7.5; 7.5; 7.5 MG/1; MG/1; MG/1; MG/1
30 TABLET ORAL 2 TIMES DAILY
Qty: 60 TAB | Refills: 0 | Status: SHIPPED | OUTPATIENT
Start: 2019-01-14 | End: 2019-02-07

## 2019-01-18 ENCOUNTER — CLINICAL SUPPORT (OUTPATIENT)
Dept: FAMILY MEDICINE CLINIC | Age: 39
End: 2019-01-18

## 2019-01-18 VITALS
SYSTOLIC BLOOD PRESSURE: 122 MMHG | DIASTOLIC BLOOD PRESSURE: 86 MMHG | WEIGHT: 269.8 LBS | HEIGHT: 68 IN | HEART RATE: 108 BPM | BODY MASS INDEX: 40.89 KG/M2

## 2019-01-18 DIAGNOSIS — E66.9 OBESITY, UNSPECIFIED CLASSIFICATION, UNSPECIFIED OBESITY TYPE, UNSPECIFIED WHETHER SERIOUS COMORBIDITY PRESENT: Primary | ICD-10-CM

## 2019-01-18 NOTE — PROGRESS NOTES
Chief Complaint   Patient presents with    Weight Management     Progress Note: Weekly Medical Monitoring in the [facility]    Is there anything that you or the patient needs to let the supervising provider know about? no    Over the past week, have you experienced any side-effects? no    Lina Zavaleta is a 34 y.o. male who is enrolled in [facility] Weight Loss Program    Lina Zavaleta was prescribed the VLCD / LCD. Visit Vitals  /86   Pulse (!) 108   Ht 5' 8\" (1.727 m)   Wt 269 lb 12.8 oz (122.4 kg)   BMI 41.02 kg/m²     Weight Metrics 1/18/2019 1/7/2019 1/7/2019 10/1/2018 9/17/2018 9/13/2018 8/1/2018   Weight 269 lb 12.8 oz - 288 lb 9.6 oz 298 lb 299 lb 290 lb 296 lb   Waist Measure Inches 53.5 56.75 - - - - -   BMI 41.02 kg/m2 - 43.88 kg/m2 45.31 kg/m2 45.46 kg/m2 44.09 kg/m2 45.01 kg/m2         Have you received any other medical care this week? no  If yes, where and for what? Have you had any change in your medications since your last visit? no  If yes what? Did you have any problems adhering to the program last week? no  If yes, please explain:       Eating Habits Over Last Week:  Did you take in 64 oz of non-caloric fluids?  no     Did you consume your prescribed meal replacement regimen each day? no       Physical Activity Over the Past Week:    Aerobic exercise: 0 min  Resistance exercise: no workouts / week

## 2019-01-25 ENCOUNTER — CLINICAL SUPPORT (OUTPATIENT)
Dept: FAMILY MEDICINE CLINIC | Age: 39
End: 2019-01-25

## 2019-01-25 ENCOUNTER — HOSPITAL ENCOUNTER (OUTPATIENT)
Dept: LAB | Age: 39
Discharge: HOME | End: 2019-01-25

## 2019-01-25 VITALS
WEIGHT: 270.8 LBS | BODY MASS INDEX: 41.04 KG/M2 | HEIGHT: 68 IN | SYSTOLIC BLOOD PRESSURE: 116 MMHG | HEART RATE: 104 BPM | DIASTOLIC BLOOD PRESSURE: 76 MMHG

## 2019-01-25 DIAGNOSIS — E66.9 OBESITY, UNSPECIFIED CLASSIFICATION, UNSPECIFIED OBESITY TYPE, UNSPECIFIED WHETHER SERIOUS COMORBIDITY PRESENT: Primary | ICD-10-CM

## 2019-01-25 LAB — SENTARA SPECIMEN COL,SENBCF: NORMAL

## 2019-01-25 PROCEDURE — 99001 SPECIMEN HANDLING PT-LAB: CPT

## 2019-01-25 NOTE — PROGRESS NOTES
Progress Note: Weekly Medical Monitoring in the [facility]   
Is there anything that you or the patient needs to let the supervising provider know about? no 
 
Over the past week, have you experienced any side-effects? no 
 
Olivia Bowden is a 34 y.o. male who is enrolled in [facility] 
 
Olivia Bowden was prescribed the VLCD / LCD. Visit Vitals /76 Pulse (!) 104 Ht 5' 8\" (1.727 m) Wt 122.8 kg (270 lb 12.8 oz) BMI 41.17 kg/m² Weight Metrics 1/25/2019 1/18/2019 1/7/2019 1/7/2019 10/1/2018 9/17/2018 9/13/2018 Weight 270 lb 12.8 oz 269 lb 12.8 oz - 288 lb 9.6 oz 298 lb 299 lb 290 lb Waist Measure Inches 52.5 53.5 56.75 - - - -  
BMI 41.17 kg/m2 41.02 kg/m2 - 43.88 kg/m2 45.31 kg/m2 45.46 kg/m2 44.09 kg/m2 Have you received any other medical care this week? no  If yes, where and for what? Have you had any change in your medications since your last visit? no  If yes what? Did you have any problems adhering to the program last week? no  If yes, please explain:  
 
 
Eating Habits Over Last Week: 
Did you take in 64 oz of non-caloric fluids? yes Did you consume your prescribed meal replacement regimen each day? yes Physical Activity Over the Past Week: 
 
Aerobic exercise: 0 min Resistance exercise: 0 workouts / week

## 2019-01-26 LAB
A-G RATIO,AGRAT: 1.9 RATIO (ref 1.1–2.6)
ABSOLUTE LYMPHOCYTE COUNT, 10803: 1.4 K/UL (ref 1–4.8)
ALBUMIN SERPL-MCNC: 4.5 G/DL (ref 3.5–5)
ALP SERPL-CCNC: 90 U/L (ref 25–115)
ALT SERPL-CCNC: 8 U/L (ref 5–40)
ANION GAP SERPL CALC-SCNC: 17 MMOL/L
AST SERPL W P-5'-P-CCNC: 18 U/L (ref 10–37)
BASOPHILS # BLD: 0 K/UL (ref 0–0.2)
BASOPHILS NFR BLD: 0 % (ref 0–2)
BILIRUB SERPL-MCNC: 0.5 MG/DL (ref 0.2–1.2)
BILIRUB UR QL: NEGATIVE
BUN SERPL-MCNC: 17 MG/DL (ref 6–22)
CALCIUM SERPL-MCNC: 9.7 MG/DL (ref 8.4–10.4)
CHLORIDE SERPL-SCNC: 95 MMOL/L (ref 98–110)
CHOLEST SERPL-MCNC: 139 MG/DL (ref 110–200)
CO2 SERPL-SCNC: 26 MMOL/L (ref 20–32)
CREAT SERPL-MCNC: 0.8 MG/DL (ref 0.5–1.2)
EOSINOPHIL # BLD: 0.2 K/UL (ref 0–0.5)
EOSINOPHIL NFR BLD: 2 % (ref 0–6)
EPITHELIAL,EPSU: ABNORMAL /HPF (ref 0–2)
ERYTHROCYTE [DISTWIDTH] IN BLOOD BY AUTOMATED COUNT: 14.6 % (ref 10–15.5)
GFRAA, 66117: >60
GFRNA, 66118: >60
GLOBULIN,GLOB: 2.4 G/DL (ref 2–4)
GLUCOSE SERPL-MCNC: 78 MG/DL (ref 70–99)
GLUCOSE UR QL: NEGATIVE MG/DL
GRANULOCYTES,GRANS: 74 % (ref 40–75)
HCT VFR BLD AUTO: 44.6 % (ref 36.6–51.9)
HDLC SERPL-MCNC: 3.1 MG/DL (ref 0–5)
HDLC SERPL-MCNC: 45 MG/DL (ref 40–59)
HGB BLD-MCNC: 14 G/DL (ref 13.2–17.3)
HGB UR QL STRIP: NEGATIVE
KETONES UR QL STRIP.AUTO: ABNORMAL MG/DL
LDLC SERPL CALC-MCNC: 74 MG/DL (ref 50–99)
LEUKOCYTE ESTERASE: NEGATIVE
LYMPHOCYTES, LYMLT: 17 % (ref 20–45)
MAGNESIUM SERPL-MCNC: 2 MG/DL (ref 1.6–2.5)
MCH RBC QN AUTO: 30 PG (ref 26–34)
MCHC RBC AUTO-ENTMCNC: 31 G/DL (ref 31–36)
MCV RBC AUTO: 96 FL (ref 80–95)
MONOCYTES # BLD: 0.5 K/UL (ref 0.1–1)
MONOCYTES NFR BLD: 7 % (ref 3–12)
NEUTROPHILS # BLD AUTO: 6 K/UL (ref 1.8–7.7)
NITRITE UR QL STRIP.AUTO: NEGATIVE
PH UR STRIP: 8 PH (ref 5–8)
PLATELET # BLD AUTO: 303 K/UL (ref 140–440)
PMV BLD AUTO: 10.3 FL (ref 9–13)
POTASSIUM SERPL-SCNC: 4.8 MMOL/L (ref 3.5–5.5)
PROT SERPL-MCNC: 6.9 G/DL (ref 6.4–8.3)
PROT UR QL STRIP: NEGATIVE MG/DL
RBC # BLD AUTO: 4.67 M/UL (ref 3.8–5.8)
RBC #/AREA URNS HPF: ABNORMAL /HPF
SODIUM SERPL-SCNC: 138 MMOL/L (ref 133–145)
SP GR UR: 1.01 (ref 1–1.03)
TRIGL SERPL-MCNC: 100 MG/DL (ref 40–149)
TSH SERPL DL<=0.005 MIU/L-ACNC: 2.48 MCU/ML (ref 0.27–4.2)
URATE SERPL-MCNC: 6.8 MG/DL (ref 3.9–9)
UROBILINOGEN UR STRIP-MCNC: <2 MG/DL
VLDLC SERPL CALC-MCNC: 20 MG/DL (ref 8–30)
WBC # BLD AUTO: 8.2 K/UL (ref 4–11)
WBC URNS QL MICRO: ABNORMAL /HPF (ref 0–2)

## 2019-02-04 ENCOUNTER — TELEPHONE (OUTPATIENT)
Dept: INTERNAL MEDICINE CLINIC | Age: 39
End: 2019-02-04

## 2019-02-04 DIAGNOSIS — K62.89 RECTAL PAIN: Primary | ICD-10-CM

## 2019-02-04 RX ORDER — HYDROCORTISONE 25 MG/G
CREAM TOPICAL 4 TIMES DAILY
Qty: 30 G | Refills: 2 | Status: SHIPPED | OUTPATIENT
Start: 2019-02-04 | End: 2020-12-01 | Stop reason: SDUPTHER

## 2019-02-04 NOTE — TELEPHONE ENCOUNTER
Pt is on [employer] medically supervised weight loss liquid diet and is c/o of severe constipation with bleeding when he does go. Wants your advice for something to ease pain when goes to bathroom because he is very sore in rectum. He has tried stool softeners without benefit. He isn't asking for a laxative per se. Please advise him at [phone].

## 2019-02-07 ENCOUNTER — OFFICE VISIT (OUTPATIENT)
Dept: SURGERY | Age: 39
End: 2019-02-07

## 2019-02-07 VITALS
BODY MASS INDEX: 40.5 KG/M2 | RESPIRATION RATE: 18 BRPM | SYSTOLIC BLOOD PRESSURE: 110 MMHG | HEIGHT: 68 IN | HEART RATE: 98 BPM | TEMPERATURE: 97.8 F | DIASTOLIC BLOOD PRESSURE: 82 MMHG | WEIGHT: 267.2 LBS

## 2019-02-07 DIAGNOSIS — E66.01 MORBID OBESITY (HCC): ICD-10-CM

## 2019-02-07 DIAGNOSIS — E66.01 MORBID OBESITY (HCC): Primary | ICD-10-CM

## 2019-02-07 DIAGNOSIS — K59.00 CONSTIPATION, UNSPECIFIED CONSTIPATION TYPE: ICD-10-CM

## 2019-02-07 NOTE — PROGRESS NOTES
[facility] Progress Note:   Follow up Provider Visit    CC: Monthly follow up. C/o constipation       Khloe Roper is a 34 y.o. male who is here for his follow up provider visit for the VLCD Program. Ubaldo Rendon has completed 4 weeks of the program to date. Weight Metrics 2/7/2019 1/25/2019 1/25/2019 1/18/2019 1/7/2019 1/7/2019 10/1/2018   Weight 267 lb 3.2 oz - 270 lb 12.8 oz 269 lb 12.8 oz - 288 lb 9.6 oz 298 lb   Waist Measure Inches - 52.5 - 53.5 56.75 - -   BMI 40.63 kg/m2 - 41.17 kg/m2 41.02 kg/m2 - 43.88 kg/m2 45.31 kg/m2         Current Outpatient Medications   Medication Sig Dispense Refill    hydrocortisone (ANUSOL-HC) 2.5 % rectal cream Insert  into rectum four (4) times daily. 30 g 2    losartan (COZAAR) 50 mg tablet Take 1 Tab by mouth daily. 90 Tab 3    albuterol (PROVENTIL HFA, VENTOLIN HFA, PROAIR HFA) 90 mcg/actuation inhaler Take 2 Puffs by inhalation every four (4) hours as needed for Wheezing or Shortness of Breath.  escitalopram oxalate (LEXAPRO) 20 mg tablet Take 20 mg by mouth daily.  buPROPion XL (WELLBUTRIN XL) 150 mg tablet Take 150 mg by mouth every morning.  cyanocobalamin (VITAMIN B-12) 1,000 mcg sublingual tablet Take 1,000 mcg by mouth daily.  valACYclovir (VALTREX) 500 mg tablet Take 1 Tab by mouth two (2) times a day. (Patient taking differently: Take 1 Tab by mouth as needed (Herpetic flare ups). ) 60 Tab 11    cloNIDine HCl (CATAPRES) 0.1 mg tablet Take 0.1 mg by mouth nightly.  traZODone (DESYREL) 50 mg tablet Take 50 mg by mouth nightly.  busPIRone (BUSPAR) 10 mg tablet Take 10 mg by mouth two (2) times a day.  dextroamphetamine-amphetamine (ADDERALL) 30 mg tablet Take 1 Tab by mouth two (2) times a dayEarliest Fill Date: 1/14/19. 60 Tab 0         Participation   Did you attend clinic and class last week? yes    Review of Systems  Since your last visit, have you experienced any complications?  yes  If yes, please list: Constipation     Positive in BOLD  CONST: Fever, weight loss, fatigue or chills  GI: Nausea, vomiting, abdominal pain, change in bowel habits, hematochezia, melena, and GERD, constipation    INTEG: Dermatitis, abnormal moles  HEENT: Recent changes in vision, vertigo, epistaxis, dysphagia and hoarseness  CV: Chest pain, palpitations, HTN, edema and varicosities  RESP: Cough, shortness of breath, wheezing, hemoptysis, snoring and reactive airway disease  : Hematuria, dysuria, frequency, urgency, nocturia and stress urinary incontinence   MS: Weakness, joint pain and arthritis  ENDO: Diabetes, thyroid disease, polyuria, polydipsia, polyphagia, poor wound healing, heat intolerance, cold intolerance  LYMPH/HEME: Anemia, bruising and history of blood transfusions  NEURO: Dizziness, headache, fainting, seizures and stroke  PSYCH: Anxiety and depression      Are you taking an appetite suppressant? no  If so, is there any Chest Pain, Palpitations or Dizziness? BP Readings from Last 10 Encounters:   02/07/19 110/82   01/25/19 116/76   01/18/19 122/86   01/07/19 112/66   10/13/18 120/71   10/11/18 118/64   10/10/18 116/72   10/10/18 116/62   10/08/18 120/74   10/06/18 100/70         Have you received any other medical care this week? no  If yes, where and for what? Have you discontinued or changed any medicine or dose of your medicine since your last visit? no  If yes, where and for what? Diet  How many ounces of calorie-free liquids did you consume each day?  96+ oz    How many meal replacements did you take each day? 4    Did you have any problems adhering to the program?  no If yes, please explain: n/a      Exercise  Aerobic exercise: 0 min  Resistance exercise: 0 workouts / week  Any discomfort?  no     If yes, where?       Review of Systems  Complete ROS negative except where noted above    Objective  Visit Vitals  /82   Pulse 98   Temp 97.8 °F (36.6 °C)   Resp 18   Ht 5' 8\" (1.727 m)   Wt 121.2 kg (267 lb 3.2 oz)   BMI 40.63 kg/m²     No LMP for male patient. Waist Circumference: I personally reviewed patient's Weight Management Doc Flowsheet  Neck Circumference: I personally reviewed patient's Weight Management Doc Flowsheet  Percent Body Fat: I personally reviewed patient's Weight Management Doc Flowsheet    Physical Exam     General: 34 y.o.) male in no acute distress. HEENT: Normocephalic, atraumatic, Pupils equal and reactive, nasopharynx clear, oropharynx clear and moist without lesions  NECK: Supple, no lymphadenopathy, thyromegaly, carotid bruits or jugular venous distension. trachea midline  RESP: Clear to auscultation bilaterally, no wheezes, rhonchi, or rales, normal respiratory excursion  CV: Regular rate and rhythm, no murmurs, rubs or gallops. 3+/4 pulses in bilateral dorsalis pedis and posterior tibialis. No distal edema or varicosities. ABD: Soft, nontender, nondistended, normoactive bowel sounds, no hernias, no hepatosplenomegaly  Extremities: Warm, well perfused, no tenderness or swelling, normal gait/station  Neuro: Sensation and strength grossly intact and symmetrical  Psych: Alert and oriented to person, place, and time. Assessment / Plan    No diagnosis found. 1.  Weight management well controlled. Pounds lost since last visit: 21    Progress was reviewed with patient    2. Labs    Latest results reviewed with patient   Lab slip given to pt for f/up HDL labs    3. Diet regimen   # of meal replacements prescribed: 4      Monthly Goal   10 pounds     Medical monitoring schedule:   Weekly BP/Weight checks   Monthly provider appointments  I have reviewed/discussed the above normal BMI with the patient. I have recommended the following interventions: dietary management education, guidance, and counseling, encourage exercise, monitor weight and prescribed dietary intake . Michelle [place] Constipation guidelines provided to patient.      Follow-up Disposition: 1 month with labs      20 minutes of the 20 minutes face to face time with Philly York consisted of counseling & coordinating and/or discussing treatment plans in reference to his weight loss.          Nikolas Benz, FNP-BC

## 2019-02-11 DIAGNOSIS — F90.0 ATTENTION DEFICIT HYPERACTIVITY DISORDER (ADHD), PREDOMINANTLY INATTENTIVE TYPE: Primary | ICD-10-CM

## 2019-02-11 RX ORDER — DEXTROAMPHETAMINE SACCHARATE, AMPHETAMINE ASPARTATE, DEXTROAMPHETAMINE SULFATE AND AMPHETAMINE SULFATE 7.5; 7.5; 7.5; 7.5 MG/1; MG/1; MG/1; MG/1
30 TABLET ORAL 2 TIMES DAILY
Qty: 60 TAB | Refills: 0 | Status: SHIPPED | OUTPATIENT
Start: 2019-02-11 | End: 2019-03-07 | Stop reason: SDUPTHER

## 2019-02-11 NOTE — TELEPHONE ENCOUNTER
[facility]2019. There appears to be no inconsistencies in regards to the prescribing of this medication. Last Visit: 09/17/2018 with MD Edin Larsen  Next Appointment: 03/19/2019 with MD Edin Larsen  Previous Refill Encounter(s): 01/14/2019 per MD Edin Larsen #60    Requested Prescriptions     Pending Prescriptions Disp Refills    dextroamphetamine-amphetamine (ADDERALL) 30 mg tablet 60 Tab 0     Sig: Take 1 Tab by mouth two (2) times a dayEarliest Fill Date: 2/11/19.   Max Daily Amount: 2 Tabs

## 2019-02-20 ENCOUNTER — CLINICAL SUPPORT (OUTPATIENT)
Dept: FAMILY MEDICINE CLINIC | Age: 39
End: 2019-02-20

## 2019-02-20 VITALS
HEART RATE: 96 BPM | BODY MASS INDEX: 41.13 KG/M2 | HEIGHT: 68 IN | WEIGHT: 271.4 LBS | DIASTOLIC BLOOD PRESSURE: 92 MMHG | SYSTOLIC BLOOD PRESSURE: 134 MMHG

## 2019-02-20 DIAGNOSIS — E66.9 OBESITY, UNSPECIFIED CLASSIFICATION, UNSPECIFIED OBESITY TYPE, UNSPECIFIED WHETHER SERIOUS COMORBIDITY PRESENT: Primary | ICD-10-CM

## 2019-02-20 NOTE — PROGRESS NOTES
Chief Complaint   Patient presents with    Weight Management     Progress Note: Weekly Medical Monitoring in the [facility]    Is there anything that you or the patient needs to let the supervising provider know about? no    Over the past week, have you experienced any side-effects? no    Doug Gandara is a 34 y.o. male who is enrolled in [facility]    Doug Gandara was prescribed the VLCD / LCD. Visit Vitals  BP (!) 134/92   Pulse 96   Ht 5' 8\" (1.727 m)   Wt 271 lb 6.4 oz (123.1 kg)   BMI 41.27 kg/m²     Weight Metrics 2/20/2019 2/7/2019 2/7/2019 1/25/2019 1/25/2019 1/18/2019 1/7/2019   Weight 271 lb 6.4 oz - 267 lb 3.2 oz - 270 lb 12.8 oz 269 lb 12.8 oz -   Waist Measure Inches 51.75 53.5 - 52.5 - 53.5 56.75   BMI 41.27 kg/m2 - 40.63 kg/m2 - 41.17 kg/m2 41.02 kg/m2 -         Have you received any other medical care this week? no  If yes, where and for what? Have you had any change in your medications since your last visit? no  If yes what? Did you have any problems adhering to the program last week? no  If yes, please explain:       Eating Habits Over Last Week:  Did you take in 64 oz of non-caloric fluids?  yes     Did you consume your prescribed meal replacement regimen each day? no       Physical Activity Over the Past Week:    Aerobic exercise: 0 min  Resistance exercise: no workouts / week

## 2019-03-06 DIAGNOSIS — F90.0 ATTENTION DEFICIT HYPERACTIVITY DISORDER (ADHD), PREDOMINANTLY INATTENTIVE TYPE: ICD-10-CM

## 2019-03-06 RX ORDER — DEXTROAMPHETAMINE SACCHARATE, AMPHETAMINE ASPARTATE, DEXTROAMPHETAMINE SULFATE AND AMPHETAMINE SULFATE 7.5; 7.5; 7.5; 7.5 MG/1; MG/1; MG/1; MG/1
30 TABLET ORAL 2 TIMES DAILY
Qty: 60 TAB | Refills: 0 | OUTPATIENT
Start: 2019-03-06

## 2019-03-06 NOTE — TELEPHONE ENCOUNTER
Last OV: 09/17/2018  Last Fill: 02/11/2019   reviewed last filled on 02/12/2019    Patient states is going out of town Friday 03/08/2019 - 03/19/2019 and stated that he needs it before Friday. Advised patient that our refill policy is 48 to 72 hours. Patient upset that I could not guarantee his refill. Advised patient that I send his refill request to another provider in the office due to Dr. Rita Mayers is out of the office.

## 2019-03-07 RX ORDER — DEXTROAMPHETAMINE SACCHARATE, AMPHETAMINE ASPARTATE, DEXTROAMPHETAMINE SULFATE AND AMPHETAMINE SULFATE 7.5; 7.5; 7.5; 7.5 MG/1; MG/1; MG/1; MG/1
30 TABLET ORAL 2 TIMES DAILY
Qty: 60 TAB | Refills: 0 | Status: SHIPPED | OUTPATIENT
Start: 2019-03-07 | End: 2019-04-03 | Stop reason: SDUPTHER

## 2019-03-07 NOTE — TELEPHONE ENCOUNTER
Could you print this please?  Dr Lluvia Robles refused to fill it due to it being a controlled substance

## 2019-03-19 ENCOUNTER — OFFICE VISIT (OUTPATIENT)
Dept: INTERNAL MEDICINE CLINIC | Age: 39
End: 2019-03-19

## 2019-03-19 VITALS
BODY MASS INDEX: 41.98 KG/M2 | HEART RATE: 105 BPM | TEMPERATURE: 97.9 F | HEIGHT: 68 IN | OXYGEN SATURATION: 99 % | WEIGHT: 277 LBS | DIASTOLIC BLOOD PRESSURE: 78 MMHG | SYSTOLIC BLOOD PRESSURE: 118 MMHG | RESPIRATION RATE: 14 BRPM

## 2019-03-19 DIAGNOSIS — I10 PRIMARY HYPERTENSION: Primary | ICD-10-CM

## 2019-03-19 DIAGNOSIS — F33.1 MODERATE EPISODE OF RECURRENT MAJOR DEPRESSIVE DISORDER (HCC): ICD-10-CM

## 2019-03-19 DIAGNOSIS — E78.5 DYSLIPIDEMIA: ICD-10-CM

## 2019-03-19 DIAGNOSIS — Z98.84 S/P GASTRIC BYPASS: ICD-10-CM

## 2019-03-19 DIAGNOSIS — E66.01 MORBID OBESITY WITH BMI OF 40.0-44.9, ADULT (HCC): ICD-10-CM

## 2019-03-19 NOTE — PROGRESS NOTES
1. Have you been to the ER, urgent care clinic or hospitalized since your last visit? NO.     2. Have you seen or consulted any other health care providers outside of the [address] [place] [address] since your last visit (Include any pap smears or colon screening)? NO      Do you have an Advanced Directive? NO    Would you like information on Advanced Directives?  NO

## 2019-03-20 NOTE — PROGRESS NOTES
34 y.o. WHITE OR  male who presents for f/u    He did well with the R partial TKR     Denies any cardiovascular complaints although still not much exercise    Introduced intermittent fasting at the last visit but he did not do. He went to the VLCD program but only could stick with it for a month plus because he was having significant constipation. He did manage to lose roughly 20 pounds while on it, has already regained about 10 pounds back    Vitals 3/19/2019 2/20/2019 2/7/2019 1/25/2019 1/18/2019   Weight 277 lb 271 lb 6.4 oz 267 lb 3.2 oz 270 lb 12.8 oz 269 lb 12.8 oz     Vitals 9/17/2018 9/13/2018 8/1/2018 5/17/2018 5/3/2018   Weight 299 lb 290 lb 296 lb 286 lb 293 lb 9.6 oz     No GI or gu complaints currently. Sleep apnea resolved after the weight loss and he does not feel that it's back as yet even w the recent wt gain    Anxiety is controlled, he remains on the Adderall for ADD.     Past Medical History:   Diagnosis Date    ADD  10/11    Allergic rhinitis     Arthritis     Dr Cindy Ogden; right knee    Asthma     as a child    Chronic back pain     Colon polyp     f/u colo age 46 per Dr Joleen Roth from 2014    Depression     FHx: heart disease     GERD (gastroesophageal reflux disease)     Hepatic steatosis     HTN (hypertension) 2010    resolved after gastric bypass    Obesity     s/p gastric bypass 5/10 Dr. Noé Orantes BMI 54, peak weight 367; elvis 195 lbs; failed qsymia 2017-18; IF 9/18 start weight 299 lbs but did not do; VLCD 12/18 to 2/18 stopped due to constipation    FRANCISCA on CPAP     resolved after wt loss    Osteoarthritis of right knee 9/30/2018    Primary hypertension 3/28/2017    Stomach ulcer 2002    Dr. Joleen Roth    Ulcerative proctitis [facility])      Past Surgical History:   Procedure Laterality Date    BIOPSY LIVER      Dr Jennifer Virgen  03/2017    echo nl lv, ef 60%, mild aortic root dilation    HX BUNIONECTOMY      Dr Kait Leach; saw [id number] then  Fransisco later    HX CHOLECYSTECTOMY  9/08    Dr Farrukh Dawn      2002 negative, last 2005 proctitis Dr Boom Casillas  3/10    Lap Cherri-en-Y BMI 47 Dr Yousuf Hairston preop weight 367 lbs    HX ORTHOPAEDIC Left     left hip Dr Koki Chapa age 13    HX ORTHOPAEDIC Right 10/2018    partial knee replacement      Social History     Socioeconomic History    Marital status:      Spouse name: Not on file    Number of children: 2    Years of education: Not on file    Highest education level: Not on file   Social Needs    Financial resource strain: Not on file    Food insecurity - worry: Not on file    Food insecurity - inability: Not on file   Portr needs - medical: Not on file   Portr needs - non-medical: Not on file   Occupational History    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 Current Outpatient Medications   Medication Sig    dextroamphetamine-amphetamine (ADDERALL) 30 mg tablet Take 1 Tab by mouth two (2) times a dayEarliest Fill Date: 3/7/19. Max Daily Amount: 2 Tabs    hydrocortisone (ANUSOL-HC) 2.5 % rectal cream Insert  into rectum four (4) times daily.  losartan (COZAAR) 50 mg tablet Take 1 Tab by mouth daily.  albuterol (PROVENTIL HFA, VENTOLIN HFA, PROAIR HFA) 90 mcg/actuation inhaler Take 2 Puffs by inhalation every four (4) hours as needed for Wheezing or Shortness of Breath.  escitalopram oxalate (LEXAPRO) 20 mg tablet Take 20 mg by mouth daily.  buPROPion XL (WELLBUTRIN XL) 150 mg tablet Take 300 mg by mouth every morning.     cyanocobalamin (VITAMIN B-12) 1,000 mcg sublingual tablet Take 1,000 mcg by mouth daily.  valACYclovir (VALTREX) 500 mg tablet Take 1 Tab by mouth two (2) times a day. (Patient taking differently: Take 1 Tab by mouth as needed (Herpetic flare ups). )    cloNIDine HCl (CATAPRES) 0.1 mg tablet Take 0.1 mg by mouth nightly.  traZODone (DESYREL) 50 mg tablet Take 50 mg by mouth nightly.  busPIRone (BUSPAR) 10 mg tablet Take 10 mg by mouth two (2) times a day. No current facility-administered medications for this visit. Allergies   Allergen Reactions    Lisinopril Cough    Nsaids (Non-Steroidal Anti-Inflammatory Drug) Other (comments)     H/o PUD and gastric bypass     REVIEW OF SYSTEMS: o 2005 Dr Devendra Snyder  Ophtho - no vision change or eye pain  Oral - no mouth pain, tongue or tooth problems  Ears - no hearing loss, ear pain, fullness, no swallowing problems  Cardiac - no CP, PND, orthopnea, edema, palpitations or syncope  Chest - no breast masses  Resp - no wheezing, chronic coughing, dyspnea  GI - no heartburn, nausea, vomiting, change in bowel habits, bleeding, hemorrhoids  Urinary - no dysuria, hematuria, flank pain, urgency, frequency    Visit Vitals  /78 (BP 1 Location: Left arm, BP Patient Position: Sitting)   Pulse (!) 105   Temp 97.9 °F (36.6 °C) (Oral)   Resp 14   Ht 5' 8\" (1.727 m)   Wt 277 lb (125.6 kg)   SpO2 99%   BMI 42.12 kg/m²     Affect is appropriate. Mood stable  No apparent distress  HEENT --Anicteric sclerae, . No thyromegaly, JVD, or bruits. Lungs --Clear to auscultation and percussion, normal percussion. Heart --Regular rate and rhythm, no murmurs, rubs, gallops, or clicks. Chest wall --Nontender to palpation. PMI normal.  Abdomen -- Soft and nontender, no hepatosplenomegaly or masses. Extremities -- Without cyanosis, clubbing, edema. 2+ pulses equally and bilaterally.     LABS  From 10/06 showed gluc 92, cr 0.80,   alt 25,         chol 207, tg 207, hdl 36, ldl-c 125, wbc 6.6, hb 14.0, plt 294  From 1/10 showed   gluc 99, cr 0.80, gfr>60,         chol 237, tg 294, hdl 45, ldl-c 133, wbc 9.3, hb 14.1, plt 285, tsh 2.76  From 9/10 showed             chol 172, tg 184, hdl 39, ldl-c 96,                                                    b12 515, fol 7.0  From 3/11 showed              chol 188, tg 146, hdl 47, ldl-c 112, wbc 6.9, hb 14.8, plt 253,                                b12 506, fol 7.6  From 4/14 showed   gluc 82, cr 0.70, gfr>60, alt 9,          chol 170, tg 62,   hdl 63, ldl-c 95,   wbc 5.7, hb 14.6, plt 238,            fe 90, %sat 27, ferritin 76, b12 205,        vit d 17.4, vit b1 16.4  From 5/15 showed   gluc 93, cr 0.80, gfr>60, alt 7,          chol 214, tg 121, hdl 65, ldl-c 125, wbc 7.9, hb 15.6, plt 268,                                          vit d 34.8  From 3/17 showed   gluc 64, cr 0.70, gfr>60, alt 18,               wbc 8.8, hb 15.3, plt 314, tsh 2.06  From 3/17 showed      hba1c 5.7, chol 225, tg 108, hdl 54, ldl-c 149,                      fe 91, %sat 23, ferritin 36, b12>2k, fol 5.8, vit d 21.7, vit b1 180  From 9/18 showed  gluc 75, cr 1.00, gfr>60, alt 22, hba1c 5.8,             wbc 9.8, hb 14.5, plt 265  From 1/19 showed gluc 78, cr 0.80, gfr>60, alrt 18,              chol 139, tg 100, hdl 45, ldl-c 74,   wbc 8.2, hb 14.0, plt 303, uric 6.8, tsh 2.48    Patient Active Problem List   Diagnosis Code    Colon polyp 8/05 K63.5    Anxiety F41.9    Dyslipidemia E78.5    Primary hypertension I10    S/P gastric bypass 3/10 Dr Mignon Galvan Z98.84    Morbid obesity with BMI of 40.0-44.9, adult [facility] E66.01, Z68.41    Moderate episode of recurrent major depressive disorder ([facility] F33.1    Attention deficit hyperactivity disorder (ADHD), predominantly inattentive type F90.0    Osteoarthritis of right knee M17.11     Assessment and plan:  1. Obesity. Lifestyle and dietary measures. Portion control reiterated. 2. Colon polyp. Fiber, Dr Zimmerman So rec f/u age 50  4. Anxiety. Continue current  4. ADD. Continue adderall for now  5. Dyslipidemia. Lifestyle and dietary measures as above  6. Hypertension. Continue current regimen. 7. Ortho. F/U specialist        RTC 3/20    Above conditions discussed at length and patient vocalized understanding.   All questions answered to patient satisfaction

## 2019-04-03 DIAGNOSIS — F90.0 ATTENTION DEFICIT HYPERACTIVITY DISORDER (ADHD), PREDOMINANTLY INATTENTIVE TYPE: ICD-10-CM

## 2019-04-03 RX ORDER — DEXTROAMPHETAMINE SACCHARATE, AMPHETAMINE ASPARTATE, DEXTROAMPHETAMINE SULFATE AND AMPHETAMINE SULFATE 7.5; 7.5; 7.5; 7.5 MG/1; MG/1; MG/1; MG/1
30 TABLET ORAL 2 TIMES DAILY
Qty: 60 TAB | Refills: 0 | Status: SHIPPED | OUTPATIENT
Start: 2019-04-03 | End: 2019-04-30 | Stop reason: SDUPTHER

## 2019-04-03 NOTE — TELEPHONE ENCOUNTER
[facility]all [facility] 3-7-19 for a 30 d/s. Last Visit: 3-19-19 with MD Denice Anne  Next Appointment: 9-24-19 with MD Denice Anne  Previous Refill Encounter(s): 3-7-19 #60    Requested Prescriptions     Pending Prescriptions Disp Refills    dextroamphetamine-amphetamine (ADDERALL) 30 mg tablet 60 Tab 0     Sig: Take 1 Tab by mouth two (2) times a day. Max Daily Amount: 2 Tabs.

## 2019-04-30 DIAGNOSIS — F90.0 ATTENTION DEFICIT HYPERACTIVITY DISORDER (ADHD), PREDOMINANTLY INATTENTIVE TYPE: ICD-10-CM

## 2019-04-30 RX ORDER — DEXTROAMPHETAMINE SACCHARATE, AMPHETAMINE ASPARTATE, DEXTROAMPHETAMINE SULFATE AND AMPHETAMINE SULFATE 7.5; 7.5; 7.5; 7.5 MG/1; MG/1; MG/1; MG/1
30 TABLET ORAL 2 TIMES DAILY
Qty: 60 TAB | Refills: 0 | Status: SHIPPED | OUTPATIENT
Start: 2019-04-30 | End: 2019-05-29 | Stop reason: SDUPTHER

## 2019-04-30 NOTE — TELEPHONE ENCOUNTER
[facility] Adderall as 4/4/19 for a 30 d/s. Last Visit: 3/19/19 with MD Nam Call  Next Appointment: 9/24/19 with MD Nam Call  Previous Refill Encounter(s): 4/3/19 #60    Requested Prescriptions     Pending Prescriptions Disp Refills    dextroamphetamine-amphetamine (ADDERALL) 30 mg tablet 60 Tab 0     Sig: Take 1 Tab by mouth two (2) times a day. Max Daily Amount: 2 Tabs.

## 2019-05-02 RX ORDER — LOSARTAN POTASSIUM 50 MG/1
50 TABLET ORAL DAILY
Qty: 90 TAB | Refills: 3 | Status: CANCELLED | OUTPATIENT
Start: 2019-05-02

## 2019-05-02 NOTE — TELEPHONE ENCOUNTER
Patient has refills at pharmacy. Called to confirm and they said Rx is ready for . I called and notified patient. No further action needed.

## 2019-05-29 DIAGNOSIS — F90.0 ATTENTION DEFICIT HYPERACTIVITY DISORDER (ADHD), PREDOMINANTLY INATTENTIVE TYPE: ICD-10-CM

## 2019-05-29 NOTE — TELEPHONE ENCOUNTER
[facility] Adderall as 5/2/19 for a 30 d/s. Last Visit: 3/19/19 with MD Medina  Next Appointment: 9/24/19 with MD Medina  Previous Refill Encounter(s): 4/30/19 #60    Requested Prescriptions     Pending Prescriptions Disp Refills    dextroamphetamine-amphetamine (ADDERALL) 30 mg tablet 60 Tab 0     Sig: Take 1 Tab by mouth two (2) times a day. Max Daily Amount: 2 Tabs.

## 2019-05-30 RX ORDER — DEXTROAMPHETAMINE SACCHARATE, AMPHETAMINE ASPARTATE, DEXTROAMPHETAMINE SULFATE AND AMPHETAMINE SULFATE 7.5; 7.5; 7.5; 7.5 MG/1; MG/1; MG/1; MG/1
30 TABLET ORAL 2 TIMES DAILY
Qty: 60 TAB | Refills: 0 | Status: SHIPPED | OUTPATIENT
Start: 2019-05-30 | End: 2019-06-26 | Stop reason: SDUPTHER

## 2019-06-26 DIAGNOSIS — F90.0 ATTENTION DEFICIT HYPERACTIVITY DISORDER (ADHD), PREDOMINANTLY INATTENTIVE TYPE: ICD-10-CM

## 2019-06-26 NOTE — TELEPHONE ENCOUNTER
[facility] for Adderall as 5/31/19 for a 30 d/s. Last Visit: 3/19/19 with MD Eliot Song  Next Appointment: 9/24/19 with MD Eliot Song  Previous Refill Encounter(s): 5/30/19 #60    Requested Prescriptions     Pending Prescriptions Disp Refills    dextroamphetamine-amphetamine (ADDERALL) 30 mg tablet 60 Tab 0     Sig: Take 1 Tab by mouth two (2) times a day. Max Daily Amount: 2 Tabs.

## 2019-06-27 RX ORDER — DEXTROAMPHETAMINE SACCHARATE, AMPHETAMINE ASPARTATE, DEXTROAMPHETAMINE SULFATE AND AMPHETAMINE SULFATE 7.5; 7.5; 7.5; 7.5 MG/1; MG/1; MG/1; MG/1
30 TABLET ORAL 2 TIMES DAILY
Qty: 60 TAB | Refills: 0 | Status: SHIPPED | OUTPATIENT
Start: 2019-06-27 | End: 2019-07-24 | Stop reason: SDUPTHER

## 2019-07-24 DIAGNOSIS — F90.0 ATTENTION DEFICIT HYPERACTIVITY DISORDER (ADHD), PREDOMINANTLY INATTENTIVE TYPE: ICD-10-CM

## 2019-07-24 RX ORDER — DEXTROAMPHETAMINE SACCHARATE, AMPHETAMINE ASPARTATE, DEXTROAMPHETAMINE SULFATE AND AMPHETAMINE SULFATE 7.5; 7.5; 7.5; 7.5 MG/1; MG/1; MG/1; MG/1
30 TABLET ORAL 2 TIMES DAILY
Qty: 60 TAB | Refills: 0 | Status: SHIPPED | OUTPATIENT
Start: 2019-07-24 | End: 2019-08-21 | Stop reason: SDUPTHER

## 2019-07-24 NOTE — TELEPHONE ENCOUNTER
[facility] date for Adderall as 6/27/19 for a 30 d/s. Last Visit: 3/19/19 with MD Jules Ayala  Next Appointment: 9/24/19 with MD Jules Ayala  Previous Refill Encounter(s): 6/27/19 #60    Requested Prescriptions     Pending Prescriptions Disp Refills    dextroamphetamine-amphetamine (ADDERALL) 30 mg tablet 60 Tab 0     Sig: Take 1 Tab by mouth two (2) times a day. Max Daily Amount: 2 Tabs.

## 2019-08-21 DIAGNOSIS — F90.0 ATTENTION DEFICIT HYPERACTIVITY DISORDER (ADHD), PREDOMINANTLY INATTENTIVE TYPE: ICD-10-CM

## 2019-08-21 NOTE — TELEPHONE ENCOUNTER
[facility] Adderall as 7/25/19 for a 30 d/s. Last Visit: 3/19/19 with MD Donna Patino  Next Appointment: 9/24/19 with MD Donna Patino  Previous Refill Encounter(s): 7/24/19 #60    Requested Prescriptions     Pending Prescriptions Disp Refills    dextroamphetamine-amphetamine (ADDERALL) 30 mg tablet 60 Tab 0     Sig: Take 1 Tab by mouth two (2) times a day. Max Daily Amount: 2 Tabs.

## 2019-08-22 ENCOUNTER — APPOINTMENT (OUTPATIENT)
Dept: INTERNAL MEDICINE CLINIC | Age: 39
End: 2019-08-22

## 2019-08-22 ENCOUNTER — TELEPHONE (OUTPATIENT)
Dept: INTERNAL MEDICINE CLINIC | Age: 39
End: 2019-08-22

## 2019-08-22 DIAGNOSIS — Z79.899 MEDICATION MANAGEMENT: Primary | ICD-10-CM

## 2019-08-22 RX ORDER — DEXTROAMPHETAMINE SACCHARATE, AMPHETAMINE ASPARTATE, DEXTROAMPHETAMINE SULFATE AND AMPHETAMINE SULFATE 7.5; 7.5; 7.5; 7.5 MG/1; MG/1; MG/1; MG/1
30 TABLET ORAL 2 TIMES DAILY
Qty: 60 TAB | Refills: 0 | Status: SHIPPED | OUTPATIENT
Start: 2019-08-22 | End: 2019-09-17 | Stop reason: SDUPTHER

## 2019-08-22 NOTE — TELEPHONE ENCOUNTER
Do to regulations per Board of medicine in regards to controlled substances. Patient is required to do a urine drug screen  and a controlled substance agreement needs to be signed. Per verbal order from [address] a urine drug was ordered.

## 2019-08-22 NOTE — LETTER
Name:.Donaldo Cox LZK:[birth year] MR #:[id number] [address] [place] [address] Page 1 of 5 CONTROLLED SUBSTANCE AGREEMENT I may be prescribed medications that are controlled substances as part  of my treatment plan for management of my medical condition(s). The goal of my treatment plan is to maintain and/or improve my health and wellbeing. Because controlled substances have an increased risk of abuse or harm, continual re-evaluation is needed determine if the goals of my treatment plan are being met for my safety and the safety of others. Vero Aviles  am entering into this Controlled Substance Agreement with my provider, Antonio Michele at [place] [address] . I understand that successful treatment requires mutual trust and honesty between me and my provider. I understand that there are state and federal laws and regulations which apply to the medications that my provider may prescribe that must be followed. I understand there are risks and benefits ts of taking the medicines that my provider may prescribe. I understand and agree that following this Agreement is necessary in continuing my provider-patient relationship and success of my treatment plan. As a part of my treatment plan, I agree to the following: COMMUNICATION: 
 
1. I will communicate fully with my provider about my medical condition(s), including the effect on my daily life and how well my medications are helping. I will tell my provider all of the medications that I take for any reason, including medications I receive from another health care provider, and will notify my provider about all issues, problems or concerns, including any side effects, which may be related to my medications. I understand that this information allows my provider to adjust my treatment plan to help manage my medical condition. I understand that this information will become part of my permanent medical record. 2. I will notify my provider if I have a history of alcohol/drug misuse/addiction or if I have had treatment for alcohol/drug addiction in the past, or if I have a new problem with or concern about alcohol/drug use/addiction, because this increases the likelihood of high risk behaviors and may lead to serious medical conditions. 3. Females Only: I will notify my provider if I am or become pregnant, or if I intend to become pregnant, or if I intend to breastfeed. I understand that communication of these issues with my provider is important, due to possible effects my medication could have on an unborn fetus or breastfeeding child. Initials_____ Name:.Donaldo Burgos LXF:[birth year] MR #:[id number] [address] [place] [address] Page 2 of 5 MISUSE OF MEDICATIONS / DRUGS: 
 
1. I agree to take all controlled substances as prescribed, and will not misuse or abuse any controlled substances prescribed by my provider. For my safety, I will not increase the amount of medicine I take without first talking with and getting permission from my provider. 2. If I have a medical emergency, another health care provider may prescribe me medication. If I seek emergency treatment, I will notify my provider within seventy-two (72) hours. 3. I understand that my provider may discuss my use and/or possible misuse/abuse of controlled substances and alcohol, as appropriate, with any health care provider involved in my care, pharmacist or legal authority. ILLEGAL DRUGS: 
 
1. I will not use illegal drugs of any kind, including but not limited to marijuana, heroin, cocaine, or any prescription drug which is not prescribed to me. DRUG DIVERSION / PRESCRIPTION FRAUD: 
 
1. I will not share, sell, trade, give away, or otherwise misuse my prescriptions or medications. 2. I will not alter any prescriptions provided to me by my provider.  
 
SINGLE PROVIDER: 
 
 1. I agree that all controlled substances that I take will be prescribed only by my provider (or his/her covering provider) under this Agreement. This agreement does not prevent me from seeking emergency medical treatment or receiving pain management related to a surgery. PROTECTING MEDICATIONS: 
 
1. I am responsible for keeping my prescriptions and medications in a safe and secure place including safeguarding them from loss or theft. I understand that lost, stolen or damaged/destroyed prescriptions or medications will not be replaced. Initials____ Name:.Donaldo Howard OOY:[birth year] MR #:[id number] [address] [place] [address] Page 3 of 5 PRESCRIPTION RENEWALS/REFILLS: 
 
1. I will follow my controlled substance medication schedule as prescribed by my provider. 2. I understand and agree that I will make any requests for renewals or refills of my prescriptions only at the time of an office visit or during my providers regular office hours subject to the prescription refill requirements of the individual practice. 3. I understand that my provider may not call in prescriptions for controlled substances to my pharmacy. 4. I understand that my provider may adjust or discontinue these medications as deemed appropriate for my medical treatment plan. This Agreement does not guarantee the prescription of controlled medications. 5. I agree that if my medications are adjusted or discontinued, I will properly dispose of any remaining medications. I understand that I will be required to dispose of any remaining controlled medications prior to being provided with any prescriptions for other controlled medications. 6. I understand that the renewal of my prescription depends on my medical condition, my consistent participation, and my adherence with my treatment plan and this Agreement.  
 
7. I understand that if I do not keep an appointment with my provider, I may not receive a renewal or refill for my controlled substance medication. PRESCRIPTION MONITORING / DRUG TESTIN. I understand that my provider may require me to provide urine, saliva or blood for testing at any time. I understand that this testing will be used to monitor for safety and adherence with my treatment plan and this Agreement. 2. I understand that my provider may ask me to provide an observed urine specimen, which means that a nurse or other health care provider may watch me provide urine, and I agree to cooperate if I am asked to provide an observed specimen. 3. I understand that if I do not provide urine, saliva or blood samples within two (2) hours of my providers request, or other timeframe decided by my provider, my treatment plan could be changed, or my prescriptions and medications may be changed or ended. 4. I understand that urine, saliva and blood test results will be a part of my permanent medical record. Initials_____ Name:Mara Evans Atwood BVG:[birth year] MR #:[id number] [address] [facility] Page 4 of 5 
 
5. I understand that my provider is required to obtain a copy of my State Prescription Monitoring Program () Report at any time in order to safely prescribe medications. 6. I will bring all of my prescribed controlled substance medications in their original bottles to all of my scheduled appointments. 7. I understand that my provider may ask me to come to the practice with all of my prescribed medications for a random pill count at any time. I agree to cooperate if I am asked to come in for a random pill count. I understand that if I do not arrive in the timeframe decided by my provider, my treatment plan could be changed, or my prescriptions and medications may be changed or ended.  
 
COOPERATION WITH INVESTIGATIONS: 
 
1. I authorize my provider and my pharmacy to cooperate fully with any local, state, or federal law enforcement agency in the investigation of any possible misuse, sale, or other diversion of my controlled substance prescriptions or medications. RISKS: 
 
1. I understand that my level of consciousness may be affected from the use of controlled substances, and I understand that there are risks, benefits, effects and potential alternatives (including no treatment) to the medications that my provider has prescribed. 2. I understand that I may become drowsy, tired, dizzy, constipated, and sick to my stomach, or have changes in my mood or in my sleep while taking my medications. I have talked with my provider about these possible side effects, risks, benefits, and alternative treatments, and my provider has answered all of my questions. 3. I understand that I should not suddenly stop taking my medications without first speaking with my provider. I understand that if I suddenly stop taking my medications, I may experience nausea, vomiting, sweating,anxiety, sleeplessness, itching or other uncomfortable feelings. 4. I will not take my medications with alcohol of any kind, including beer, wine or liquor. I understand that drinking alcohol with my medications increases the chances of side effects, including breathing problems or even death. 5. I understand that if I have a history of alcoholism or other drug addiction I may be at increased risk of addiction to my medications. Signs of addiction might include craving, compulsive use, and continued use despite harm. Since addiction is a disease, I understand my provider may decide to change my medications and refer me to appropriate treatment services. I understand that this information would become part of my permanent medical record. Initials_____ Name:.Donaldo Johnsonabad Abby RGV:[birth year] MR #:[id number] [address] [facility] Page 5 of 5  
 
 
6.  Females only: Children born to women who regularly take controlled substances are likely to have physical problems and suffer withdrawal symptoms at birth. If I am of child-bearing age, I understand that I should use safe and effective birth control while taking any controlled substances to avoid the impact of medications on an unborn fetus or  child. I agree to notify my provider immediately if I should become pregnant so that my treatment plan can be adjusted. 7. Males only: I understand that chronic use of controlled substances has been associated with low testosterone levels in males which may affect my mood, stamina, sexual desire, and general health. I understand that my provider may order the appropriate laboratory test to determine my testosterone level,and I agree to this testing. ADHERENCE: 
 
1. I understand that if I do not adhere to this Agreement in any way, my provider may change my prescriptions, stop prescribing controlled substances or end our provider-patient relationship. 2. If my provider decides to stop prescribing medication, or decides to end our provider-patient relationship,my provider may require that I taper my medications slowly. If necessary, my provider may also provide a prescription for other medications to treat my withdrawal symptoms. UNDERSTANDING THIS AGREEMENT: 
 
I understand that my provider may adjust or stop my prescriptions for controlled substances based on my medical condition and my treatment plan. I understand that this Agreement does not guarantee that I will be prescribed medications or controlled substances. I understand that controlled substances may be just one part 
of my treatment plan. My initial on each page and my signature below shows that I have read each page of this Agreement, I have had an opportunity to ask questions, and all of my questions have been answered to my satisfaction by my provider.  
 
By signing below, I agree to comply with this Agreement, and I understand that if I do not follow the Agreements listed above, my provider may stop 
 
_________________________________________  Date/Time 8/22/2019 12:43 PM   
             (Patient Signature) 
 
________________________________________    Date/Time 8/22/2019 12:43 PM 
 (Parent or Guardian Signature if <18 yrs) 
 
_________________________________________ Date/Time 8/22/2019 12:43 PM 
 (Provider Signature)

## 2019-08-24 LAB
AMPHETAMINE, 737686: NEGATIVE NG/ML
BARBITURATES: NEGATIVE NG/ML
BENZODIAZEPINES, R9BE1T: NEGATIVE NG/ML
CANNABINOID, DR86L: NEGATIVE NG/ML
COCAINE/METABOLITES, MDS2T: NEGATIVE NG/ML
CREATININE URINE,3297223: 22.9 MG/DL (ref 20–300)
FENTANYL, PMD99: NEGATIVE PG/ML
MEPERIDINE, 761055: NEGATIVE NG/ML
METHADONE, 791633: NEGATIVE NG/ML
OPIATE, DR88L: NEGATIVE NG/ML
OXYCODONE/OXYMORPHONE-PM: NEGATIVE NG/ML
PH UR STRIP: 5.5 [PH] (ref 4.5–8.9)
PHENCYCLIDINE: NEGATIVE NG/ML
PLEASE NOTE, TRIC1T: NORMAL
PROPOXYPHENE, 791635: NEGATIVE NG/ML
SPECIFIC GRAVITY, 790387: 1.01
TRAMADOL, 711021: NEGATIVE NG/ML

## 2019-09-10 ENCOUNTER — TELEPHONE (OUTPATIENT)
Dept: INTERNAL MEDICINE CLINIC | Age: 39
End: 2019-09-10

## 2019-09-10 NOTE — TELEPHONE ENCOUNTER
----- Message from Eddie Anderson MD sent at 8/30/2019  1:02 PM EDT -----  pls send letter of discharge  UDS negative for amphetamine  He's been getting adderall every 27 to 30 days for a year now

## 2019-09-16 DIAGNOSIS — Z98.84 S/P GASTRIC BYPASS: ICD-10-CM

## 2019-09-16 NOTE — PROGRESS NOTES
Nursing Note for Medical Monitoring in the [facility]      Roxanne Carrizales is a 34 y.o. male who is enrolled in [facility]    Roxanne Carrizales was prescribed the VLCD / LCD. vlcd     Did you have any problems adhering to the program last week? yes  If yes, please explain: had problems with constipation     Since your last visit, have you experienced any complications? Yes constipation     Have you received any other medical care this week? no  If yes, where and for what? Have you had any change in your medications since your last visit? no  If yes what? Are you taking an appetite suppressant? no   If yes:  Do you need a refill? BP Readings from Last 3 Encounters:   01/25/19 116/76   01/18/19 122/86   01/07/19 112/66        Eating Habits Over Last Week:  Did you take in 64 oz of non-caloric fluids?  yes     Did you consume your prescribed meal replacement regimen each day? no       Physical Activity Over the Past Week:    Aerobic exercise: 0 min  Resistance  0 workouts / week 1 Hour(s)

## 2019-09-17 DIAGNOSIS — F90.0 ATTENTION DEFICIT HYPERACTIVITY DISORDER (ADHD), PREDOMINANTLY INATTENTIVE TYPE: ICD-10-CM

## 2019-09-17 RX ORDER — DEXTROAMPHETAMINE SACCHARATE, AMPHETAMINE ASPARTATE, DEXTROAMPHETAMINE SULFATE AND AMPHETAMINE SULFATE 7.5; 7.5; 7.5; 7.5 MG/1; MG/1; MG/1; MG/1
30 TABLET ORAL 2 TIMES DAILY
Qty: 60 TAB | Refills: 0 | Status: SHIPPED | OUTPATIENT
Start: 2019-09-17

## 2019-09-17 NOTE — TELEPHONE ENCOUNTER
[facility] date for Adderall as 8/22/19 for a 30 d/s. Last Visit: 3/19/19 with MD Kristian Whelan  Next Appointment: none  Previous Refill Encounter(s): 8/22/19 #60    Requested Prescriptions     Pending Prescriptions Disp Refills    dextroamphetamine-amphetamine (ADDERALL) 30 mg tablet 60 Tab 0     Sig: Take 1 Tab by mouth two (2) times a day. Max Daily Amount: 2 Tabs.

## 2019-09-18 ENCOUNTER — DOCUMENTATION ONLY (OUTPATIENT)
Dept: INTERNAL MEDICINE CLINIC | Age: 39
End: 2019-09-18

## 2019-09-18 ENCOUNTER — TELEPHONE (OUTPATIENT)
Dept: INTERNAL MEDICINE CLINIC | Age: 39
End: 2019-09-18

## 2019-09-18 DIAGNOSIS — Z98.84 S/P GASTRIC BYPASS: ICD-10-CM

## 2019-09-18 NOTE — TELEPHONE ENCOUNTER
Patient came in to the office and was informed that the medication would no longer be prescribed and that he had been sent a discharge letter. Patient states that sometimes during the month he would take an extra half tab on certain days. He said this in turn would short him at the end of the month but that he just would deal with it. I informed patient that the directions state take 1 tab by mouth twice daily- he verbalized that he is aware how the directions were written. He states that he is willing to do a drug test every time to prove that he is taking it as prescribed. He states that he feels like we are trying to say that he is selling them or giving them away and that this is not the case and that he is very embarressed. Advised patient the facts are that he is getting the script every 27-30 days and his urine was not consistent with taking the medication as prescribed. He is asking if Dr. Cornelia Nissen would reconsider.

## 2019-09-18 NOTE — TELEPHONE ENCOUNTER
LMTCB    To inform patient that Dr. Lit Stone will not be giving the RX for adderall. Discharge letter was sent to patient on 9/10/19.

## 2019-09-19 DIAGNOSIS — Z98.84 S/P GASTRIC BYPASS: ICD-10-CM

## 2019-10-22 ENCOUNTER — TELEPHONE (OUTPATIENT)
Dept: INTERNAL MEDICINE CLINIC | Age: 39
End: 2019-10-22

## 2019-12-02 ENCOUNTER — HOSPITAL ENCOUNTER (OUTPATIENT)
Dept: LAB | Age: 39
Discharge: HOME | End: 2019-12-02
Payer: COMMERCIAL

## 2019-12-02 DIAGNOSIS — Z79.899 ENCOUNTER FOR LONG-TERM (CURRENT) USE OF OTHER MEDICATIONS: ICD-10-CM

## 2019-12-02 LAB
ATRIAL RATE: 84 BPM
CALCULATED P AXIS, ECG09: 12 DEGREES
CALCULATED R AXIS, ECG10: -11 DEGREES
CALCULATED T AXIS, ECG11: 6 DEGREES
DIAGNOSIS, 93000: NORMAL
P-R INTERVAL, ECG05: 152 MS
Q-T INTERVAL, ECG07: 370 MS
QRS DURATION, ECG06: 90 MS
QTC CALCULATION (BEZET), ECG08: 437 MS
VENTRICULAR RATE, ECG03: 84 BPM

## 2019-12-02 PROCEDURE — 93005 ELECTROCARDIOGRAM TRACING: CPT

## 2020-05-08 NOTE — TELEPHONE ENCOUNTER
Last seen 04/27/20  Next appt  None    Requested Prescriptions     Pending Prescriptions Disp Refills    albuterol (PROVENTIL HFA, VENTOLIN HFA, PROAIR HFA) 90 mcg/actuation inhaler 1 Inhaler      Sig: Take 2 Puffs by inhalation every four (4) hours as needed for Wheezing or Shortness of Breath.

## 2020-05-09 RX ORDER — ALBUTEROL SULFATE 90 UG/1
2 AEROSOL, METERED RESPIRATORY (INHALATION)
Qty: 1 INHALER | Refills: 0 | Status: SHIPPED | OUTPATIENT
Start: 2020-05-09 | End: 2021-09-13 | Stop reason: SDUPTHER

## 2020-05-22 DIAGNOSIS — F90.0 ATTENTION DEFICIT HYPERACTIVITY DISORDER (ADHD), PREDOMINANTLY INATTENTIVE TYPE: ICD-10-CM

## 2020-05-22 NOTE — TELEPHONE ENCOUNTER
Last seen 04/27/20  Next appt  None    Requested Prescriptions     Pending Prescriptions Disp Refills    dextroamphetamine-amphetamine (ADDERALL) 30 mg tablet 60 Tab 0     Sig: Take 1 Tab by mouth two (2) times a day. Max Daily Amount: 2 Tabs.

## 2020-05-23 RX ORDER — DEXTROAMPHETAMINE SACCHARATE, AMPHETAMINE ASPARTATE, DEXTROAMPHETAMINE SULFATE AND AMPHETAMINE SULFATE 7.5; 7.5; 7.5; 7.5 MG/1; MG/1; MG/1; MG/1
30 TABLET ORAL 2 TIMES DAILY
Qty: 60 TAB | Refills: 0 | OUTPATIENT
Start: 2020-05-23

## 2020-05-23 NOTE — TELEPHONE ENCOUNTER
Last filled by [facility]. 5/1/20.  She should be filling this if he is still seeing her  Also, the refill is early

## 2020-06-15 ENCOUNTER — TELEPHONE (OUTPATIENT)
Dept: FAMILY MEDICINE CLINIC | Facility: CLINIC | Age: 40
End: 2020-06-15

## 2020-06-15 NOTE — TELEPHONE ENCOUNTER
Patient request prescription for motion sickness when out on the boat. He fishes a lot and Dramamine doesn't help anymore.  He would like it sent to [facility].

## 2020-06-17 DIAGNOSIS — T75.3XXA MOTION SICKNESS, INITIAL ENCOUNTER: Primary | ICD-10-CM

## 2020-06-17 RX ORDER — SCOLOPAMINE TRANSDERMAL SYSTEM 1 MG/1
1 PATCH, EXTENDED RELEASE TRANSDERMAL
Qty: 10 PATCH | Refills: 3 | Status: SHIPPED | OUTPATIENT
Start: 2020-06-17 | End: 2022-03-19

## 2020-12-01 DIAGNOSIS — K62.89 RECTAL PAIN: ICD-10-CM

## 2020-12-01 RX ORDER — HYDROCORTISONE 25 MG/G
CREAM TOPICAL 4 TIMES DAILY
Qty: 30 G | Refills: 2 | Status: SHIPPED | OUTPATIENT
Start: 2020-12-01 | End: 2022-05-13

## 2020-12-13 NOTE — PROGRESS NOTES
Consent: Radha Acosta, who was seen by synchronous (real-time) audio-video technology, and/or his healthcare decision maker, is aware that this patient-initiated, Telehealth encounter on 12/15/2020 is a billable service, with coverage as determined by his insurance carrier. He is aware that he may receive a bill and has provided verbal consent to proceed: Yes. The patient was at home and I was at the offices of the [address] [place] [address] no one else participated in the service. ASSESSMENT and PLAN    ICD-10-CM ICD-9-CM    1. Attention deficit hyperactivity disorder (ADHD), predominantly inattentive type  F90.0 314.00 CBC WITH AUTOMATED DIFF      METABOLIC PANEL, COMPREHENSIVE    This is a chronic problem. It is presently well controlled. We will continue the same treatment. 2. Primary hypertension  I10 401.9 CBC WITH AUTOMATED DIFF    Insert [facility] chronic. Labs before next visit. 3. Encounter for immunization  Z23 V03.89 diph,Pertuss,Acell,,Tet Vac-PF (ADACEL) 2 Lf-(2.5-5-3-5 mcg)-5Lf/0.5 mL susp    Prescriptions written for Tdap. Patient agrees to have the flu vaccine to be done here. 4. FRANCISCA (obstructive sleep apnea)  G47.33 327.23     Chronic problem we will resume using the machine when he can. 5. Screening for cholesterol level  Z13.220 V77.91 LIPID PANEL     Follow-up and Dispositions    · Return in about 6 months (around 6/15/2021) for Follow up on illness, Draw labs/diagnostics 1 week prior to next visit.       lab results and schedule of future lab studies reviewed with patient  reviewed diet, exercise and weight control        Health Maintenance Due   Topic Date Due    DTaP/Tdap/Td series (1 - Tdap) 05/02/1999    Flu Vaccine (1) 09/01/2020         Subjective:   Radha Acosta is a 41 y.o. male who is being seen in follow-up. The patient has Colon polyp 8/05, Anxiety, Dyslipidemia, Primary hypertension, S/P gastric bypass 3/10 Dr Nicky Bob, Morbid obesity with BMI of 40.0-44.9, adult ([address] Moderate episode of recurrent major depressive disorder ([facility] [address] Attention deficit hyperactivity disorder (ADHD), predominantly inattentive type, and Osteoarthritis of right knee on their problem list..  The patient was seen on April 2020 for unhealthy weight, depression, FRANCISCA for which she was referred for sleep studies, and essential hypertension. His most recent labs in January 2019 were reassuring including lipids and thyroid studies. Hemorrhoids  Surgery twice in 4 years. The patient has a new one now and is using the hydrocortisone cream to good effect. There has been no bleeding. Hypertension  The patient has had no problem with the medication. The patient has no headaches, visual changes, chest pain or pressure,dyspnea, orthopnea, abdominal pain, dysuria, weakness, or paresthesias. BP Readings from Last 3 Encounters:   03/19/19 118/78   02/20/19 (!) 134/92   02/07/19 110/82         [facility] CAD CHF Meds             losartan (COZAAR) 50 mg tablet take 1 tablet by mouth once daily    cloNIDine HCl (CATAPRES) 0.1 mg tablet Take 0.1 mg by mouth nightly. ADHD  The history is provided by the patient. . This is a  chronic problem. The problem occurs  constantly. The problem has  not changed since onset. Pertinent negatives include no symptoms when he is on his medication. .   Nothing aggravates the symptoms. The patient has tried or is using his medication. Obstructive sleep apnea  Not using his CPAP machine since Saturday as he is having pain on the left side when he fell and he believes he broke his rib. Does not think he needs an x-ray at this time. He will call if he begins to get short of breath or his cough worsens. Current Outpatient Medications   Medication Sig    hydrocortisone (ANUSOL-HC) 2.5 % rectal cream Insert  into rectum four (4) times daily.  scopolamine (TRANSDERM-SCOP) 1 mg over 3 days pt3d 1 Patch by TransDERmal route every seventy-two (72) hours.  Indications: motion sickness, prevention of motion sickness    albuterol (PROVENTIL HFA, VENTOLIN HFA, PROAIR HFA) 90 mcg/actuation inhaler Take 2 Puffs by inhalation every four (4) hours as needed for Wheezing or Shortness of Breath.  losartan (COZAAR) 50 mg tablet take 1 tablet by mouth once daily    dextroamphetamine-amphetamine (ADDERALL) 30 mg tablet Take 1 Tab by mouth two (2) times a day. Max Daily Amount: 2 Tabs.  escitalopram oxalate (LEXAPRO) 20 mg tablet Take 20 mg by mouth daily.  buPROPion XL (WELLBUTRIN XL) 150 mg tablet Take 300 mg by mouth every morning.  cyanocobalamin (VITAMIN B-12) 1,000 mcg sublingual tablet Take 1,000 mcg by mouth daily.  valACYclovir (VALTREX) 500 mg tablet Take 1 Tab by mouth two (2) times a day. (Patient taking differently: Take 1 Tab by mouth as needed (Herpetic flare ups). )    cloNIDine HCl (CATAPRES) 0.1 mg tablet Take 0.1 mg by mouth nightly.  traZODone (DESYREL) 50 mg tablet Take 50 mg by mouth nightly. No current facility-administered medications for this visit.       Allergies   Allergen Reactions    Lisinopril Cough    Nsaids (Non-Steroidal Anti-Inflammatory Drug) Other (comments)     H/o PUD and gastric bypass     has Colon polyp 8/05, Anxiety, Dyslipidemia, Primary hypertension, S/P gastric bypass 3/10 Dr Toshia Grant, Morbid obesity with BMI of 40.0-44.9, adult ([facility] [address] Moderate episode of recurrent major depressive disorder ([facility] [address] Attention deficit hyperactivity disorder (ADHD), predominantly inattentive type, and Osteoarthritis of right knee on their problem list.  Past Surgical History:   Procedure Laterality Date    BIOPSY LIVER      Dr Ángel Cordero 2010   [address]  03/2017    echo nl lv, ef 60%, mild aortic root dilation    HX BUNIONECTOMY      Dr Jona Ibarra; saw [id number] then Dr Arabella Reyes later    HX CHOLECYSTECTOMY  9/08    Dr Caridad Davis      2002 negative, last 2005 proctitis Dr Lincoln Crowder  3/10    Lap Cherri-en-Y BMI 54 Dr Prasanna Kingston preop weight 367 lbs    HX ORTHOPAEDIC Left     left hip Dr Sadi Pelayo age 13    HX ORTHOPAEDIC Right 10/2018    partial knee replacement      Relationships   Social connections    Talks on phone: Not on file    Gets together: Not on file    Attends Mandaeism service: Not on file    Active member of club or organization: Not on file    Attends meetings of clubs or organizations: Not on file    Relationship status: Not on file     family history includes Arthritis-osteo in his mother; Cancer in his maternal grandmother and paternal grandfather; Depression in his father and mother; Diabetes in his paternal grandfather; Elevated Lipids in his father; Heart Disease in his mother and paternal grandfather; Hypertension in his father; Stroke in his maternal grandfather. Review of Systems   Constitutional: Negative for fever and malaise/fatigue. Respiratory: Negative for shortness of breath and wheezing (History of childhood asthma. He uses his albuterol inhaler about once a year). Cardiovascular: Negative for chest pain Budd Radar in his garage and now has pain on the left side is aggravated by coughing. He believes he broke a rib but does not wish to have an x-ray at this time. ). Gastrointestinal:        Recurrent hemorrhoid treated with cream.   Musculoskeletal: Negative for myalgias. Psychiatric/Behavioral: Negative for depression. Physical Exam  Constitutional:       General: He is not in acute distress. HENT:      Right Ear: External ear normal.      Left Ear: External ear normal.      Nose: Nose normal.      Mouth/Throat:      Mouth: Mucous membranes are moist.      Pharynx: Oropharynx is clear. Eyes:      General: No scleral icterus. Extraocular Movements: Extraocular movements intact. Pupils: Pupils are equal, round, and reactive to light. Pulmonary:      Effort: Pulmonary effort is normal.   Musculoskeletal:         General: No swelling.       Right lower leg: No edema. Left lower leg: No edema. Skin:     Coloration: Skin is not jaundiced. Findings: No erythema. Neurological:      Mental Status: He is alert and oriented to person, place, and time. Coordination: Coordination normal.      Gait: Gait normal.   Psychiatric:         Mood and Affect: Mood normal.         Behavior: Behavior normal.        Results for orders placed or performed during the hospital encounter of 12/02/19   EKG, 12 LEAD, INITIAL   Result Value Ref Range    Ventricular Rate 84 BPM    Atrial Rate 84 BPM    P-R Interval 152 ms    QRS Duration 90 ms    Q-T Interval 370 ms    QTC Calculation (Bezet) 437 ms    Calculated P Axis 12 degrees    Calculated R Axis -11 degrees    Calculated T Axis 6 degrees    Diagnosis       Sinus rhythm with fusion complexes  Minimal voltage criteria for LVH, may be normal variant  Borderline ECG  When compared with ECG of 19-DEC-2018 09:48,  fusion complexes are now present  Confirmed by Genaro Lamar MD, --- ([id number]) on 12/2/2019 11:47:31 AM     No results found for this or any previous visit. We discussed the expected course, resolution and complications of the diagnosis(es) in detail. Medication risks, benefits, costs, interactions, and alternatives were discussed as indicated. I advised him to contact the office if his condition worsens, changes or fails to improve as anticipated. He expressed understanding with the diagnosis(es) and plan. Ester Alpers is a 41 y.o. male being evaluated by a video visit encounter for concerns as above. A caregiver was present when appropriate. Due to this being a TeleHealth encounter (During [place] [address] public health emergency), evaluation of the following organ systems was limited: Vitals/Constitutional/EENT/Resp/CV/GI//MS/Neuro/Skin/Heme-Lymph-Imm.   Pursuant to the emergency declaration under the [facility] [place], [address] waiver authority and the [employer] Supplemental Appropriations Act, this Virtual  Visit was conducted, with patient's (and/or legal guardian's) consent, to reduce the patient's risk of exposure to COVID-19 and provide necessary medical care. This note was done with the assistance of dragon speech software.   Some inadvertent errors or omissions may be present

## 2020-12-15 ENCOUNTER — VIRTUAL VISIT (OUTPATIENT)
Dept: FAMILY MEDICINE CLINIC | Age: 40
End: 2020-12-15
Payer: COMMERCIAL

## 2020-12-15 DIAGNOSIS — F90.0 ATTENTION DEFICIT HYPERACTIVITY DISORDER (ADHD), PREDOMINANTLY INATTENTIVE TYPE: Primary | ICD-10-CM

## 2020-12-15 DIAGNOSIS — Z13.220 SCREENING FOR CHOLESTEROL LEVEL: ICD-10-CM

## 2020-12-15 DIAGNOSIS — G47.33 OSA (OBSTRUCTIVE SLEEP APNEA): ICD-10-CM

## 2020-12-15 DIAGNOSIS — I10 PRIMARY HYPERTENSION: ICD-10-CM

## 2020-12-15 DIAGNOSIS — Z23 ENCOUNTER FOR IMMUNIZATION: ICD-10-CM

## 2020-12-15 PROCEDURE — 99214 OFFICE O/P EST MOD 30 MIN: CPT | Performed by: EMERGENCY MEDICINE

## 2021-03-05 DIAGNOSIS — Z13.220 SCREENING FOR CHOLESTEROL LEVEL: ICD-10-CM

## 2021-03-05 DIAGNOSIS — I10 PRIMARY HYPERTENSION: ICD-10-CM

## 2021-03-05 DIAGNOSIS — F90.0 ATTENTION DEFICIT HYPERACTIVITY DISORDER (ADHD), PREDOMINANTLY INATTENTIVE TYPE: ICD-10-CM

## 2021-03-24 ENCOUNTER — VIRTUAL VISIT (OUTPATIENT)
Dept: FAMILY MEDICINE CLINIC | Age: 41
End: 2021-03-24
Payer: COMMERCIAL

## 2021-03-24 DIAGNOSIS — I10 PRIMARY HYPERTENSION: ICD-10-CM

## 2021-03-24 DIAGNOSIS — J30.1 SEASONAL ALLERGIC RHINITIS DUE TO POLLEN: ICD-10-CM

## 2021-03-24 DIAGNOSIS — F90.0 ATTENTION DEFICIT HYPERACTIVITY DISORDER (ADHD), PREDOMINANTLY INATTENTIVE TYPE: ICD-10-CM

## 2021-03-24 DIAGNOSIS — Z11.59 ENCOUNTER FOR HEPATITIS C SCREENING TEST FOR LOW RISK PATIENT: Primary | ICD-10-CM

## 2021-03-24 PROCEDURE — 99213 OFFICE O/P EST LOW 20 MIN: CPT | Performed by: EMERGENCY MEDICINE

## 2021-03-24 RX ORDER — AZELASTINE 1 MG/ML
1 SPRAY, METERED NASAL 2 TIMES DAILY
Qty: 1 BOTTLE | Refills: 0 | Status: SHIPPED | OUTPATIENT
Start: 2021-03-24 | End: 2021-07-02

## 2021-03-24 RX ORDER — PREDNISONE 5 MG/1
TABLET ORAL
Qty: 1 DOSE PACK | Refills: 0 | Status: SHIPPED | OUTPATIENT
Start: 2021-03-24 | End: 2021-12-20

## 2021-03-24 NOTE — PROGRESS NOTES
Consent: Manan Carrasco, who was seen by synchronous (real-time) audio-video technology, and/or his healthcare decision maker, is aware that this patient-initiated, Telehealth encounter on 3/24/2021 is a billable service, with coverage as determined by his insurance carrier. He is aware that he may receive a bill and has provided verbal consent to proceed: Yes. The patient was at home and I was at the offices of the [address] [place] [address] no one else participated in the service. Health Maintenance Due   Topic Date Due    Hepatitis C Screening  Never done    DTaP/Tdap/Td series (1 - Tdap) Never done    Flu Vaccine (1) 09/01/2020       Subjective:   Manan Carrasco is a 41 y.o. male who is being seen in follow-up. The patient has Colon polyp 8/05, Anxiety, Dyslipidemia, Primary hypertension, S/P gastric bypass 3/10 Dr Brittny Alex, Morbid obesity with BMI of 40.0-44.9, adult ([facility] [address] Moderate episode of recurrent major depressive disorder [facility]), Attention deficit hyperactivity disorder (ADHD), predominantly inattentive type, and Osteoarthritis of right knee on their problem list..  We last saw this gentleman in December 2020 for ADHD, hypertension, obstructive sleep apnea, and screening for cholesterol level. Labs are pending. Nasal congestion  Patient presents with a headache and congestion in the chest and nasal passages. Minimal nasal drainage is admitted to. He is clear. No fever or chills is admitted to. The symptoms have been present for 7 days. This is a seasonal problem, he states and he states that he usually has it improved with the short burst of steroids. This is happens for several years in a row. He is also using four-way nasal spray. .  Hypertension  The patient has had no problem with the medication. The patient has no headaches, visual changes, chest pain or pressure,dyspnea, orthopnea, abdominal pain, dysuria, weakness, or paresthesias.   124/83  BP Readings from Last 3 Encounters:   03/19/19 118/78   02/20/19 (!) 134/92   02/07/19 110/82       No results found for: EKG, GFR  No data recorded     Key CAD CHF Meds             losartan (COZAAR) 50 mg tablet take 1 tablet by mouth once daily    cloNIDine HCl (CATAPRES) 0.1 mg tablet Take 0.1 mg by mouth nightly.          ADHD  The patient states that the medication for his ADHD is working well.  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  He is able to finish tasks.  He does not have flight of ideas.  He is tolerating the medication well.  There have been no side effects.  Obstructive sleep apnea  Did sleep study in [place], within the last 3 months.  He states that he was told that he had mild to moderate sleep apnea and was placed on a CPAP machine.  He is having trouble tolerating it at night so he is only using it sporadically.  After discussion with him, he states that he will call the specialist to have his machine adjusted for him.    Current Outpatient Medications   Medication Sig   • hydrocortisone (ANUSOL-HC) 2.5 % rectal cream Insert  into rectum four (4) times daily.   • scopolamine (TRANSDERM-SCOP) 1 mg over 3 days pt3d 1 Patch by TransDERmal route every seventy-two (72) hours. Indications: motion sickness, prevention of motion sickness   • albuterol (PROVENTIL HFA, VENTOLIN HFA, PROAIR HFA) 90 mcg/actuation inhaler Take 2 Puffs by inhalation every four (4) hours as needed for Wheezing or Shortness of Breath.   • losartan (COZAAR) 50 mg tablet take 1 tablet by mouth once daily   • dextroamphetamine-amphetamine (ADDERALL) 30 mg tablet Take 1 Tab by mouth two (2) times a day. Max Daily Amount: 2 Tabs.   • escitalopram oxalate (LEXAPRO) 20 mg tablet Take 20 mg by mouth daily.   • buPROPion XL (WELLBUTRIN XL) 150 mg tablet Take 300 mg by mouth every morning.   • cyanocobalamin (VITAMIN B-12) 1,000 mcg sublingual tablet Take 1,000 mcg by mouth daily.   • valACYclovir (VALTREX) 500 mg tablet Take 1 Tab by mouth two (2)  times a day. (Patient taking differently: Take 1 Tab by mouth as needed (Herpetic flare ups). )    cloNIDine HCl (CATAPRES) 0.1 mg tablet Take 0.1 mg by mouth nightly.  traZODone (DESYREL) 50 mg tablet Take 50 mg by mouth nightly. No current facility-administered medications for this visit. Allergies   Allergen Reactions    Lisinopril Cough    Nsaids (Non-Steroidal Anti-Inflammatory Drug) Other (comments)     H/o PUD and gastric bypass     has Colon polyp 8/05, Anxiety, Dyslipidemia, Primary hypertension, S/P gastric bypass 3/10 Dr Raghav Velasquez, Morbid obesity with BMI of 40.0-44.9, adult ([facility] [address] Moderate episode of recurrent major depressive disorder ([facility] [address] Attention deficit hyperactivity disorder (ADHD), predominantly inattentive type, and Osteoarthritis of right knee on their problem list.  Past Surgical History:   Procedure Laterality Date    BIOPSY LIVER      Dr Stephanie Arzate 2010   [place]  03/2017    echo nl lv, ef 60%, mild aortic root dilation    HX BUNIONECTOMY      Dr Malick Vernon; saw [id number] then Dr Italo Andersen later    HX CHOLECYSTECTOMY  9/08    Dr Gladys Pickering      2002 negative, last 2005 proctitis Dr Nikhil Velazquez  3/10    Lap Cherri-en-Y BMI 47 Dr Raghav Velasquez preop weight 367 lbs    HX ORTHOPAEDIC Left     left hip Dr Bessy Venegas age 13    HX ORTHOPAEDIC Right 10/2018    partial knee replacement       reports that he has quit smoking. His smokeless tobacco use includes chew. He reports that he does not drink alcohol or use drugs. family history includes Arthritis-osteo in his mother; Cancer in his maternal grandmother and paternal grandfather; Depression in his father and mother; Diabetes in his paternal grandfather; Elevated Lipids in his father; Heart Disease in his mother and paternal grandfather; Hypertension in his father; Stroke in his maternal grandfather. Review of Systems   Constitutional: Positive for malaise/fatigue. Negative for fever.    HENT: Positive for congestion and sinus pain. Negative for ear discharge, hearing loss, nosebleeds and sore throat. Respiratory: Negative for shortness of breath, wheezing (History of childhood asthma. He uses his albuterol inhaler about once a year) and stridor. Cardiovascular: Negative for chest pain. Gastrointestinal:        Recurrent hemorrhoid treated with cream.   Musculoskeletal: Negative for myalgias. Neurological: Negative for dizziness. Psychiatric/Behavioral: Positive for depression. Negative for suicidal ideas. The patient is not nervous/anxious. Physical Exam  Constitutional:       General: He is not in acute distress. HENT:      Right Ear: External ear normal.      Left Ear: External ear normal.      Nose: Nose normal.      Mouth/Throat:      Mouth: Mucous membranes are moist.      Pharynx: Oropharynx is clear. Eyes:      General: No scleral icterus. Extraocular Movements: Extraocular movements intact. Pupils: Pupils are equal, round, and reactive to light. Pulmonary:      Effort: Pulmonary effort is normal.   Musculoskeletal:         General: No swelling. Right lower leg: No edema. Left lower leg: No edema. Skin:     Coloration: Skin is not jaundiced. Findings: No erythema. Neurological:      Mental Status: He is alert and oriented to person, place, and time.       Coordination: Coordination normal.      Gait: Gait normal.   Psychiatric:         Mood and Affect: Mood normal.         Behavior: Behavior normal.         Lab Results   Component Value Date/Time    WBC 8.2 01/25/2019 04:48 AM    HGB 14.0 01/25/2019 04:48 AM    HCT 44.6 01/25/2019 04:48 AM    PLATELET 845 38/57/3848 04:48 AM    MCV 96 (H) 01/25/2019 04:48 AM     Lab Results   Component Value Date/Time    Hemoglobin A1c 5.8 (H) 09/04/2018 01:20 PM    Hemoglobin A1c 5.7 03/31/2017 12:00 PM    Glucose 78 01/25/2019 04:48 AM    Glucose (POC) 93 10/01/2018 11:20 AM    LDL, calculated 74 01/25/2019 04:48 AM    Creatinine 0.8 01/25/2019 04:48 AM      Lab Results   Component Value Date/Time    Cholesterol, total 139 01/25/2019 04:48 AM    HDL Cholesterol 45 01/25/2019 04:48 AM    LDL, calculated 74 01/25/2019 04:48 AM    Triglyceride 100 01/25/2019 04:48 AM    CHOL/HDL Ratio 4.0 03/08/2011 07:39 AM     Lab Results   Component Value Date/Time    Sodium 138 01/25/2019 04:48 AM    Potassium 4.8 01/25/2019 04:48 AM    Chloride 95 (L) 01/25/2019 04:48 AM    CO2 26 01/25/2019 04:48 AM    Anion gap 17.0 01/25/2019 04:48 AM    Glucose 78 01/25/2019 04:48 AM    BUN 17 01/25/2019 04:48 AM    Creatinine 0.8 01/25/2019 04:48 AM    BUN/Creatinine ratio 12 10/02/2018 02:40 AM    GFR est AA >60 10/02/2018 02:40 AM    GFR est non-AA >60 10/02/2018 02:40 AM    Calcium 9.7 01/25/2019 04:48 AM             We discussed the expected course, resolution and complications of the diagnosis(es) in detail. Medication risks, benefits, costs, interactions, and alternatives were discussed as indicated. I advised him to contact the office if his condition worsens, changes or fails to improve as anticipated. He expressed understanding with the diagnosis(es) and plan. Rajesh Amin is a 41 y.o. male being evaluated by a video visit encounter for concerns as above. A caregiver was present when appropriate. Due to this being a TeleHealth encounter (During [place]-29 public health emergency), evaluation of the following organ systems was limited: Vitals/Constitutional/EENT/Resp/CV/GI//MS/Neuro/Skin/Heme-Lymph-Imm. Pursuant to the emergency declaration under the [facility], [address] waiver authority and the [employer] Act, this Virtual  Visit was conducted, with patient's (and/or legal guardian's) consent, to reduce the patient's risk of exposure to COVID-19 and provide necessary medical care.      This note was done with the assistance of dragon speech software.   Some inadvertent errors or omissions may be present

## 2021-04-02 ENCOUNTER — TELEPHONE (OUTPATIENT)
Dept: FAMILY MEDICINE CLINIC | Age: 41
End: 2021-04-02

## 2021-04-25 DIAGNOSIS — I10 PRIMARY HYPERTENSION: ICD-10-CM

## 2021-04-25 RX ORDER — LOSARTAN POTASSIUM 50 MG/1
TABLET ORAL
Qty: 90 TAB | Refills: 3 | Status: SHIPPED | OUTPATIENT
Start: 2021-04-25 | End: 2022-05-08

## 2021-05-10 DIAGNOSIS — Z13.220 SCREENING FOR CHOLESTEROL LEVEL: ICD-10-CM

## 2021-05-10 DIAGNOSIS — I10 PRIMARY HYPERTENSION: Primary | ICD-10-CM

## 2021-05-10 DIAGNOSIS — Z11.59 ENCOUNTER FOR HEPATITIS C SCREENING TEST FOR LOW RISK PATIENT: ICD-10-CM

## 2021-09-13 RX ORDER — ALBUTEROL SULFATE 90 UG/1
2 AEROSOL, METERED RESPIRATORY (INHALATION)
Qty: 18 G | Refills: 3 | Status: SHIPPED | OUTPATIENT
Start: 2021-09-13 | End: 2022-03-07 | Stop reason: SDUPTHER

## 2021-09-13 NOTE — TELEPHONE ENCOUNTER
Requested Prescriptions     Pending Prescriptions Disp Refills    albuterol (PROVENTIL HFA, VENTOLIN HFA, PROAIR HFA) 90 mcg/actuation inhaler       Sig: Take 2 Puffs by inhalation every four (4) hours as needed for Wheezing or Shortness of Breath.

## 2021-12-20 ENCOUNTER — VIRTUAL VISIT (OUTPATIENT)
Dept: FAMILY MEDICINE CLINIC | Age: 41
End: 2021-12-20
Payer: COMMERCIAL

## 2021-12-20 DIAGNOSIS — I10 PRIMARY HYPERTENSION: ICD-10-CM

## 2021-12-20 DIAGNOSIS — J20.9 ACUTE EXACERBATION OF CHRONIC BRONCHITIS (HCC): Primary | ICD-10-CM

## 2021-12-20 DIAGNOSIS — J42 ACUTE EXACERBATION OF CHRONIC BRONCHITIS (HCC): Primary | ICD-10-CM

## 2021-12-20 DIAGNOSIS — F90.0 ATTENTION DEFICIT HYPERACTIVITY DISORDER (ADHD), PREDOMINANTLY INATTENTIVE TYPE: ICD-10-CM

## 2021-12-20 DIAGNOSIS — G47.33 OSA (OBSTRUCTIVE SLEEP APNEA): ICD-10-CM

## 2021-12-20 PROCEDURE — 99213 OFFICE O/P EST LOW 20 MIN: CPT | Performed by: EMERGENCY MEDICINE

## 2021-12-20 RX ORDER — PREDNISONE 50 MG/1
50 TABLET ORAL DAILY
Qty: 5 TABLET | Refills: 0 | Status: SHIPPED | OUTPATIENT
Start: 2021-12-20 | End: 2021-12-25

## 2021-12-20 RX ORDER — AZITHROMYCIN 250 MG/1
TABLET, FILM COATED ORAL
Qty: 6 TABLET | Refills: 0 | Status: SHIPPED | OUTPATIENT
Start: 2021-12-20 | End: 2021-12-25

## 2021-12-20 NOTE — PROGRESS NOTES
Consent: Neel Zamora, who was seen by synchronous (real-time) audio-video technology, and/or his healthcare decision maker, is aware that this patient-initiated, Telehealth encounter on 12/20/2021 is a billable service, with coverage as determined by his insurance carrier. He is aware that he may receive a bill and has provided verbal consent to proceed: Yes. The patient was at home and I was at the offices of the [address] [place] [address] no one else participated in the service. ICD-10-CM ICD-9-CM    1. Acute exacerbation of chronic bronchitis (HCC)  J20.9 466.0     J42 491.9    2. Primary hypertension  I10 401.9    3. Attention deficit hyperactivity disorder (ADHD), predominantly inattentive type  F90.0 314.00    4. FRANCISCA (obstructive sleep apnea)  G47.33 327.23          Assessment and plan  The patient presents with acute exacerbation of chronic bronchitis. Most likely infectious in the PE daughter and wife also has similar symptoms. He does have a underlying history of asthma as well as obstructive sleep apnea. He is a non-smoker. Continue inhaler. Add Z-Ventura and prednisone. Call if not improving. Primary hypertension doing very well continue present regimen. Attention deficit disorder patient states that it stable with no exacerbations and that there is no problem with the medication. Continue present regimen. Will address health maintenance on next visit. Lab results and schedule of future lab studies reviewed with patient  Diagnostic and radiologic results and the schedule of future studies were reviewed with the patient  All questions answered and understood. Subjective:   Neel Zamora is a 35 y.o. male who is being seen in follow-up. The patient has Colon polyp 8/05, Anxiety, Dyslipidemia, Primary hypertension, S/P gastric bypass 3/10 Dr [facility], Morbid obesity with BMI of 40.0-44.9, adult ([address] Moderate episode of recurrent major depressive disorder [facility]), Attention deficit hyperactivity disorder (ADHD), predominantly inattentive type, and Osteoarthritis of right knee on their problem list..      The last visit was 3/24/2021. We last saw this gentleman in December 2020 for ADHD, hypertension, obstructive sleep apnea, and screening for cholesterol level. Labs are pending. Respiratory complaints  Six week history of what he describes as congestion in the nose and chest with difficulty moving air. There is a mild cough with some phlegm production. No fevers or chills admitted to. Wife and daughter also had the symptoms. They went to the doctor and had z pack and prednisone and improved. He states that this is a partially seasonal occurrence. He continues to use his albuterol inhaler with some improvement. He is a non-smoker. Nasal congestion  Patient presents with a headache and congestion in the chest and nasal passages. Minimal nasal drainage is admitted to. He is clear. No fever or chills is admitted to. The symptoms have been present for 7 days. This is a seasonal problem, he states and he states that he usually has it improved with the short burst of steroids. This is happens for several years in a row. He is also using four-way nasal spray. .  Hypertension  The patient has had no problem with the medication. The patient has no headaches, visual changes, chest pain or pressure,dyspnea, orthopnea, abdominal pain, dysuria, weakness, or paresthesias. 124/83  BP Readings from Last 3 Encounters:   03/19/19 118/78   02/20/19 (!) 134/92   02/07/19 110/82       ADHD  The patient states that the medication for his ADHD is working well. 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 He is able to finish tasks. He does not have flight of ideas. He is tolerating the medication well. There have been no side effects. Obstructive sleep apnea  Did sleep study in [place], within the last 3 months.   He states that he was told that he had mild to moderate sleep apnea and was placed on a CPAP machine. He is having trouble tolerating it at night so he is only using it sporadically. After discussion with him, he states that he will call the specialist to have his machine adjusted for him. Review of Systems   Constitutional: Positive for malaise/fatigue. Negative for fever. HENT: Positive for congestion and sinus pain. Negative for ear discharge, hearing loss, nosebleeds and sore throat. Respiratory: Negative for shortness of breath, wheezing (History of childhood asthma. He uses his albuterol inhaler about once a year) and stridor. Cardiovascular: Negative for chest pain. Gastrointestinal:        Recurrent hemorrhoid treated with cream.   Genitourinary: Negative for dysuria. Musculoskeletal: Negative for myalgias. Neurological: Negative for dizziness. Psychiatric/Behavioral: Positive for depression. Negative for suicidal ideas. The patient is not nervous/anxious. Health Maintenance Due   Topic Date Due    Hepatitis C Screening  Never done    COVID-19 Vaccine (1) Never done    DTaP/Tdap/Td series (1 - Tdap) Never done    Flu Vaccine (1) 09/01/2021         Current Outpatient Medications   Medication Sig    albuterol (PROVENTIL HFA, VENTOLIN HFA, PROAIR HFA) 90 mcg/actuation inhaler Take 2 Puffs by inhalation every four (4) hours as needed for Wheezing or Shortness of Breath.  losartan (COZAAR) 50 mg tablet take 1 tablet by mouth once daily    predniSONE (STERAPRED) 5 mg dose pack Use as directed in tapering dose    hydrocortisone (ANUSOL-HC) 2.5 % rectal cream Insert  into rectum four (4) times daily.  scopolamine (TRANSDERM-SCOP) 1 mg over 3 days pt3d 1 Patch by TransDERmal route every seventy-two (72) hours. Indications: motion sickness, prevention of motion sickness    dextroamphetamine-amphetamine (ADDERALL) 30 mg tablet Take 1 Tab by mouth two (2) times a day. Max Daily Amount: 2 Tabs.     escitalopram oxalate (LEXAPRO) 20 mg tablet Take 20 mg by mouth daily.  buPROPion XL (WELLBUTRIN XL) 150 mg tablet Take 300 mg by mouth every morning.  cyanocobalamin (VITAMIN B-12) 1,000 mcg sublingual tablet Take 1,000 mcg by mouth daily.  valACYclovir (VALTREX) 500 mg tablet Take 1 Tab by mouth two (2) times a day. (Patient taking differently: Take 1 Tab by mouth as needed (Herpetic flare ups). )    cloNIDine HCl (CATAPRES) 0.1 mg tablet Take 0.1 mg by mouth nightly.  traZODone (DESYREL) 50 mg tablet Take 50 mg by mouth nightly. No current facility-administered medications for this visit. Allergies   Allergen Reactions    Lisinopril Cough    Nsaids (Non-Steroidal Anti-Inflammatory Drug) Other (comments)     H/o PUD and gastric bypass     has Colon polyp 8/05, Anxiety, Dyslipidemia, Primary hypertension, S/P gastric bypass 3/10 Dr Connie Weinstein, Morbid obesity with BMI of 40.0-44.9, adult ([facility] [address] Moderate episode of recurrent major depressive disorder ([facility] [address] Attention deficit hyperactivity disorder (ADHD), predominantly inattentive type, and Osteoarthritis of right knee on their problem list.  Past Surgical History:   Procedure Laterality Date    BIOPSY LIVER      Dr Jones Erm 2010   Amairani Hails  03/2017    echo nl lv, ef 60%, mild aortic root dilation    HX BUNIONECTOMY      Dr Page Armas; saw [id number] then Dr Misha Black later    HX CHOLECYSTECTOMY  9/08    Dr Wale Brown      2002 negative, last 2005 proctitis Dr Gage Bhatti  3/10    Lap Cherri-en-Y BMI 47 Dr Connie Weinstein preop weight 367 lbs    HX ORTHOPAEDIC Left     left hip Dr Javi Schroeder age 13    HX ORTHOPAEDIC Right 10/2018    partial knee replacement       reports that he has quit smoking. His smokeless tobacco use includes chew. He reports that he does not drink alcohol and does not use drugs.   family history includes Cancer in his maternal grandmother and paternal grandfather; Depression in his father and mother; Diabetes in his paternal grandfather; Elevated Lipids in his father; Heart Disease in his mother and paternal grandfather; Hypertension in his father; OSTEOARTHRITIS in his mother; Stroke in his maternal grandfather. Physical Exam  Constitutional:       General: He is not in acute distress. HENT:      Right Ear: External ear normal.      Left Ear: External ear normal.      Nose: Nose normal.      Mouth/Throat:      Mouth: Mucous membranes are moist.      Pharynx: Oropharynx is clear. Eyes:      General: No scleral icterus. Extraocular Movements: Extraocular movements intact. Pupils: Pupils are equal, round, and reactive to light. Pulmonary:      Effort: Pulmonary effort is normal.      Comments: Slightly rapid respiratory rate of 22/min on my exam  Musculoskeletal:         General: No swelling. Right lower leg: No edema. Left lower leg: No edema. Skin:     Coloration: Skin is not jaundiced. Findings: No erythema. Neurological:      Mental Status: He is alert and oriented to person, place, and time.       Coordination: Coordination normal.      Gait: Gait normal.   Psychiatric:         Mood and Affect: Mood normal.         Behavior: Behavior normal.         Lab Results   Component Value Date/Time    WBC 8.2 01/25/2019 04:48 AM    HGB 14.0 01/25/2019 04:48 AM    HCT 44.6 01/25/2019 04:48 AM    PLATELET 343 70/18/2312 04:48 AM    MCV 96 (H) 01/25/2019 04:48 AM     Lab Results   Component Value Date/Time    Hemoglobin A1c 5.8 (H) 09/04/2018 01:20 PM    Hemoglobin A1c 5.7 03/31/2017 12:00 PM    Glucose 78 01/25/2019 04:48 AM    Glucose (POC) 93 10/01/2018 11:20 AM    LDL, calculated 74 01/25/2019 04:48 AM    Creatinine 0.8 01/25/2019 04:48 AM      Lab Results   Component Value Date/Time    Cholesterol, total 139 01/25/2019 04:48 AM    HDL Cholesterol 45 01/25/2019 04:48 AM    LDL, calculated 74 01/25/2019 04:48 AM    Triglyceride 100 01/25/2019 04:48 AM    CHOL/HDL Ratio 4.0 03/08/2011 07:39 AM     Lab Results   Component Value Date/Time    Sodium 138 01/25/2019 04:48 AM    Potassium 4.8 01/25/2019 04:48 AM    Chloride 95 (L) 01/25/2019 04:48 AM    CO2 26 01/25/2019 04:48 AM    Anion gap 17.0 01/25/2019 04:48 AM    Glucose 78 01/25/2019 04:48 AM    BUN 17 01/25/2019 04:48 AM    Creatinine 0.8 01/25/2019 04:48 AM    BUN/Creatinine ratio 12 10/02/2018 02:40 AM    GFR est AA >60 10/02/2018 02:40 AM    GFR est non-AA >60 10/02/2018 02:40 AM    Calcium 9.7 01/25/2019 04:48 AM         We discussed the expected course, resolution and complications of the diagnosis(es) in detail. Medication risks, benefits, costs, interactions, and alternatives were discussed as indicated. I advised him to contact the office if his condition worsens, changes or fails to improve as anticipated. He expressed understanding with the diagnosis(es) and plan. Arline Larson is a 35 y.o. male being evaluated by a video visit encounter for concerns as above. A caregiver was present when appropriate. Due to this being a TeleHealth encounter (During [place] [id number] public health emergency), evaluation of the following organ systems was limited: Vitals/Constitutional/EENT/Resp/CV/GI//MS/Neuro/Skin/Heme-Lymph-Imm. Pursuant to the emergency declaration under the [facility], 1135 waiver authority and the [employer] Act, this Virtual  Visit was conducted, with patient's (and/or legal guardian's) consent, to reduce the patient's risk of exposure to COVID-19 and provide necessary medical care. This note was done with the assistance of dragon speech software.   Some inadvertent errors or omissions may be present

## 2022-01-24 ENCOUNTER — NURSE TRIAGE (OUTPATIENT)
Dept: OTHER | Facility: CLINIC | Age: 42
End: 2022-01-24

## 2022-01-24 NOTE — TELEPHONE ENCOUNTER
Subjective: Caller states \"Numbness and tingling in the R wrist, down through the hand and into my 3rd and 4th finger. The 5th finger is painful. The 2nd finger doesn't usually hurt, but all fingers will hurt when making a fist.\"     Current Symptoms: wrist and hand pain, numbness and tingling. There is also some pain at times in the R arm and elbow    Onset: 2 weeks ago; gradual      Pain Severity: 6/10; aching; intermittent and there is also occasional shooting pain, rated at a 9/10 and it will wake him up 3 or 4 times/night    What has been tried: wrist brace didn't really help. Recommended disposition: be seen by Provider    Care advice provided, patient verbalizes understanding; denies any other questions or concerns; instructed to call back for any new or worsening symptoms. Appt made    Attention Provider: Thank you for allowing me to participate in the care of your patient. The patient was connected to triage in response to information provided to the [facility]. Please do not respond through this encounter as the response is not directed to a shared pool.       Reason for Disposition   SEVERE joint swelling (e.g., can barely bend or move wrist joint)    Protocols used: [facility]

## 2022-03-01 ENCOUNTER — TELEPHONE (OUTPATIENT)
Dept: FAMILY MEDICINE CLINIC | Age: 42
End: 2022-03-01

## 2022-03-01 NOTE — TELEPHONE ENCOUNTER
----- Message from Roque Swift sent at 3/1/2022  2:47 PM EST -----  Subject: Message to Provider    QUESTIONS  Information for Provider? pt had covid 2 weeks ago and test neg now and   still has a bad cough and asking for cough meds and steriod pack. pt said   when he gets a bad cough like this steroids' helps a lot.   ---------------------------------------------------------------------------  --------------  CALL BACK INFO  What is the best way for the office to contact you? OK to leave message on   voicemail  Preferred Call Back Phone Number? [phone]  ---------------------------------------------------------------------------  --------------  SCRIPT ANSWERS  Relationship to Patient?  Self

## 2022-03-02 DIAGNOSIS — R05.9 COUGH: ICD-10-CM

## 2022-03-02 RX ORDER — BENZONATATE 100 MG/1
100 CAPSULE ORAL
Qty: 30 CAPSULE | Refills: 0 | Status: SHIPPED | OUTPATIENT
Start: 2022-03-02 | End: 2022-03-09

## 2022-03-02 RX ORDER — METHYLPREDNISOLONE 4 MG/1
TABLET ORAL
Qty: 1 DOSE PACK | Refills: 0 | Status: SHIPPED | OUTPATIENT
Start: 2022-03-02 | End: 2022-03-19

## 2022-03-07 ENCOUNTER — NURSE TRIAGE (OUTPATIENT)
Dept: OTHER | Facility: CLINIC | Age: 42
End: 2022-03-07

## 2022-03-07 ENCOUNTER — OFFICE VISIT (OUTPATIENT)
Dept: FAMILY MEDICINE CLINIC | Age: 42
End: 2022-03-07
Payer: COMMERCIAL

## 2022-03-07 VITALS
HEART RATE: 96 BPM | HEIGHT: 68 IN | DIASTOLIC BLOOD PRESSURE: 77 MMHG | SYSTOLIC BLOOD PRESSURE: 118 MMHG | WEIGHT: 303 LBS | TEMPERATURE: 97.1 F | BODY MASS INDEX: 45.92 KG/M2 | RESPIRATION RATE: 16 BRPM | OXYGEN SATURATION: 95 %

## 2022-03-07 DIAGNOSIS — N40.1 BENIGN PROSTATIC HYPERPLASIA WITH INCOMPLETE BLADDER EMPTYING: ICD-10-CM

## 2022-03-07 DIAGNOSIS — B00.9 HSV-2 (HERPES SIMPLEX VIRUS 2) INFECTION: ICD-10-CM

## 2022-03-07 DIAGNOSIS — J42 CHRONIC BRONCHITIS, UNSPECIFIED CHRONIC BRONCHITIS TYPE (HCC): ICD-10-CM

## 2022-03-07 DIAGNOSIS — R53.82 CHRONIC FATIGUE: ICD-10-CM

## 2022-03-07 DIAGNOSIS — R39.14 BENIGN PROSTATIC HYPERPLASIA WITH INCOMPLETE BLADDER EMPTYING: ICD-10-CM

## 2022-03-07 DIAGNOSIS — N30.01 ACUTE CYSTITIS WITH HEMATURIA: Primary | ICD-10-CM

## 2022-03-07 DIAGNOSIS — G47.33 OSA (OBSTRUCTIVE SLEEP APNEA): ICD-10-CM

## 2022-03-07 LAB
BILIRUB UR QL STRIP: NORMAL
GLUCOSE UR-MCNC: NORMAL MG/DL
KETONES P FAST UR STRIP-MCNC: NEGATIVE MG/DL
PH UR STRIP: 6 [PH] (ref 4.6–8)
PROT UR QL STRIP: NORMAL
SP GR UR STRIP: 1.03 (ref 1–1.03)
UA UROBILINOGEN AMB POC: NORMAL (ref 0.2–1)
URINALYSIS CLARITY POC: CLEAR
URINALYSIS COLOR POC: YELLOW
URINE BLOOD POC: NORMAL
URINE LEUKOCYTES POC: NORMAL
URINE NITRITES POC: POSITIVE

## 2022-03-07 PROCEDURE — 99214 OFFICE O/P EST MOD 30 MIN: CPT | Performed by: STUDENT IN AN ORGANIZED HEALTH CARE EDUCATION/TRAINING PROGRAM

## 2022-03-07 PROCEDURE — 81003 URINALYSIS AUTO W/O SCOPE: CPT | Performed by: STUDENT IN AN ORGANIZED HEALTH CARE EDUCATION/TRAINING PROGRAM

## 2022-03-07 RX ORDER — VALACYCLOVIR HYDROCHLORIDE 500 MG/1
500 TABLET, FILM COATED ORAL AS NEEDED
Qty: 30 TABLET | Refills: 0 | Status: SHIPPED | OUTPATIENT
Start: 2022-03-07

## 2022-03-07 RX ORDER — TAMSULOSIN HYDROCHLORIDE 0.4 MG/1
0.8 CAPSULE ORAL DAILY
Qty: 180 CAPSULE | Refills: 0 | Status: SHIPPED | OUTPATIENT
Start: 2022-03-07 | End: 2022-06-07 | Stop reason: SDUPTHER

## 2022-03-07 RX ORDER — LEVOFLOXACIN 500 MG/1
500 TABLET, FILM COATED ORAL DAILY
Qty: 5 TABLET | Refills: 0 | Status: SHIPPED | OUTPATIENT
Start: 2022-03-07 | End: 2022-03-12

## 2022-03-07 RX ORDER — ALBUTEROL SULFATE 90 UG/1
2 AEROSOL, METERED RESPIRATORY (INHALATION)
Qty: 18 G | Refills: 3 | Status: SHIPPED | OUTPATIENT
Start: 2022-03-07

## 2022-03-07 RX ORDER — TAMSULOSIN HYDROCHLORIDE 0.4 MG/1
0.8 CAPSULE ORAL DAILY
Qty: 60 CAPSULE | Refills: 1 | Status: SHIPPED | OUTPATIENT
Start: 2022-03-07 | End: 2022-03-07

## 2022-03-07 NOTE — TELEPHONE ENCOUNTER
Received call from Stone Ng at [facility] with Red Flag Complaint. Subjective: Caller Wife Amanda, states \"foul odor to urine, lethargy, problems urinating\"  Then patient on lines. Current Symptoms: listless, odor to urine today, hurts to urinate, frequency. He started a \"Z pack that I had laying around\"   Discussed that Z pack probably isn't the drug of choice for UTI, and might alter the urine cx results, so would hold that until otherwise instructed. Onset: 2 days ago; gradual, unchanged    Pain Severity: 6/10; burning; intermittent, 4/10. Always has some pain, worse when urinates. Temperature: denies by parent's tactile estimate    What has been tried: ibuprofen     Recommended disposition: See in Office Today    Care advice provided, patient verbalizes understanding; denies any other questions or concerns; instructed to call back for any new or worsening symptoms. Patient/Caller agrees with recommended disposition; writer provided warm transfer to Amber Taveras at [facility] for appointment scheduling    Attention Provider: Thank you for allowing me to participate in the care of your patient. The patient was connected to triage in response to information provided to the [facility]. Please do not respond through this encounter as the response is not directed to a shared pool.       Reason for Disposition   All other males with painful urination, or patient wants to be seen    Protocols used: URINATION PAIN - MALE-ADULT-[place]

## 2022-03-07 NOTE — PROGRESS NOTES
Brit Tavarez is a 35 y.o. male presenting today for Urinary Burning (pt presents for painful urinatio since saturday )  . Chief Complaint   Patient presents with    Urinary Burning     pt presents for painful urinatio since saturday        HPI:  Brit Tavarez presents to the office today for painful urination. Patient has a past medical history significant for hypertension, ADHD, FRANCISCA, chronic bronchitis. Patient complaining of increased urinary frequency since the last few years. He has nocturia. He has to strain to urinate - noticed a weak stream. He feels he is not able to completely empty his bladder. He has also noticed weaker ejaculation. Since Saturday, he has been experiencing dysuria. No difficulty in erections. No testicular swelling. Patient is also complaining of chronic fatigue, increased snoring, daytime drowsiness and has to frequently take naps. He was diagnosed with FRANCISCA 15 years ago, however, he has not been using the CPAP machine. He states that it was very uncomfortable. He has not been following with a sleep medicine specialist.    Review of Systems   Constitutional: Positive for malaise/fatigue. Negative for chills, diaphoresis, fever and weight loss. HENT: Negative for congestion, ear discharge, ear pain, hearing loss, nosebleeds, sinus pain, sore throat and tinnitus. Eyes: Negative for blurred vision, double vision and photophobia. Respiratory: Negative for cough, sputum production, shortness of breath, wheezing and stridor. Cardiovascular: Positive for leg swelling. Negative for chest pain, palpitations, orthopnea and claudication. Gastrointestinal: Negative for abdominal pain, constipation, diarrhea, heartburn, nausea and vomiting. Genitourinary: Positive for dysuria, frequency and urgency. Negative for flank pain and hematuria. Musculoskeletal: Negative for back pain, joint pain, myalgias and neck pain. Skin: Negative for rash.    Neurological: Positive for headaches. Negative for tingling, tremors, sensory change, speech change, focal weakness, seizures and weakness. Psychiatric/Behavioral: Negative for depression. The patient is not nervous/anxious. All other systems reviewed and are negative.       Allergies   Allergen Reactions    Lisinopril Cough    Nsaids (Non-Steroidal Anti-Inflammatory Drug) Other (comments)     H/o PUD and gastric bypass       PHQ Screening   3 most recent PHQ Screens 3/7/2022   Little interest or pleasure in doing things Not at all   Feeling down, depressed, irritable, or hopeless Not at all   Total Score PHQ 2 0       History  Past Medical History:   Diagnosis Date    ADD  10/11    Allergic rhinitis     Arthritis     Dr Noni Sanford; right knee    Asthma     as a child    Chronic back pain     Colon polyp     f/u colo age 46 per Dr Geri Gonzalez from 2014    Depression     FHx: heart disease     GERD (gastroesophageal reflux disease)     Hepatic steatosis     HTN (hypertension) 2010    resolved after gastric bypass    Obesity     s/p gastric bypass 5/10 Dr. Jessi Barrett BMI 54, peak weight 367; elvis 195 lbs; failed qsymia 2017-18; IF 9/18 start weight 299 lbs but did not do; VLCD 12/18 to 2/18 stopped due to constipation    FRANCISCA on CPAP     resolved after wt loss    Osteoarthritis of right knee 9/30/2018    Primary hypertension 3/28/2017    Stomach ulcer 2002    Dr. Ana Espinal Ulcerative proctitis [facility])        Past Surgical History:   Procedure Laterality Date    BIOPSY LIVER      Dr Jessi Barrett 2010    Fco Watt; saw [id number] then Dr Lakshmi Tejeda later    HX CHOLECYSTECTOMY  9/08    Dr Hima Bustillo      2002 negative, last 2005 proctitis Dr Coe Phmary  3/10    Lap Cherri-en-Y BMI 47 Dr Christine Smith preop weight 367 lbs    HX ORTHOPAEDIC Left     left hip Dr Shireen Dakin age 13    HX ORTHOPAEDIC Right 10/2018    partial knee replacement     [place] CARDIAC SURG PROCEDURE UNLIST  03/2017    echo nl lv, ef 60%, mild aortic root dilation       Social History     Socioeconomic History    Marital status:      Spouse name: Not on file    Number of children: 2    Years of education: Not on file    Highest education level: Not on file   Occupational History    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 Social Determinants of Health     Financial Resource Strain:     Difficulty of Paying Living Expenses: Not on file   Food Insecurity:     Worried About Running Out of Food in the Last Year: Not on file    Sandi of Food in the Last Year: Not on file   Transportation Needs:     Lack of Transportation (Medical): Not on file    Lack of Transportation (Non-Medical):  Not on file   Physical Activity:     Days of Exercise per Week: Not on file    Minutes of Exercise per Session: Not on file   Stress:     Feeling of Stress : Not on file   Social Connections:     Frequency of Communication with Friends and Family: Not on file    Frequency of Social Gatherings with Friends and Family: Not on file    Attends Caodaism Services: Not on file    Active Member of Clubs or Organizations: Not on file    Attends Club or Organization Meetings: Not on file    Marital Status: Not on file   Intimate Partner Violence:     Fear of Current or Ex-Partner: Not on file    Emotionally Abused: Not on file    Physically Abused: Not on file    Sexually Abused: Not on file   Housing Stability:     Unable to Pay for Housing in the Last Year: Not on file    Number of Places Lived in the Last Year: Not on file    Unstable Housing in the Last Year: Not on file       Current Outpatient Medications   Medication Sig Dispense Refill    levoFLOXacin (LEVAQUIN) 500 mg tablet Take 1 Tablet by mouth daily for 5 days. Indications: complicated bacterial urinary tract infections 5 Tablet 0    albuterol (PROVENTIL HFA, VENTOLIN HFA, PROAIR HFA) 90 mcg/actuation inhaler Take 2 Puffs by inhalation every four (4) hours as needed for Wheezing or Shortness of Breath. 18 g 3    valACYclovir (VALTREX) 500 mg tablet Take 1 Tablet by mouth as needed (Herpetic flare ups). 30 Tablet 0    losartan (COZAAR) 50 mg tablet take 1 tablet by mouth once daily 90 Tab 3    hydrocortisone (ANUSOL-HC) 2.5 % rectal cream Insert  into rectum four (4) times daily. 30 g 2    dextroamphetamine-amphetamine (ADDERALL) 30 mg tablet Take 1 Tab by mouth two (2) times a day. Max Daily Amount: 2 Tabs. 60 Tab 0    escitalopram oxalate (LEXAPRO) 20 mg tablet Take 20 mg by mouth daily.  buPROPion XL (WELLBUTRIN XL) 150 mg tablet Take 300 mg by mouth every morning.  cyanocobalamin (VITAMIN B-12) 1,000 mcg sublingual tablet Take 1,000 mcg by mouth daily.  traZODone (DESYREL) 50 mg tablet Take 50 mg by mouth nightly.  tamsulosin (FLOMAX) 0.4 mg capsule TAKE 2 CAPSULES BY MOUTH DAILY 180 Capsule 0    methylPREDNISolone (Medrol, [place],) 4 mg tablet Use as directed, tapering dose (Patient not taking: Reported on 3/7/2022) 1 Dose Pack 0    benzonatate (TESSALON) 100 mg capsule Take 1 Capsule by mouth three (3) times daily as needed for Cough for up to 7 days. Indications: cough (Patient not taking: Reported on 3/7/2022) 30 Capsule 0    scopolamine (TRANSDERM-SCOP) 1 mg over 3 days pt3d 1 Patch by TransDERmal route every seventy-two (72) hours. Indications: motion sickness, prevention of motion sickness (Patient not taking: Reported on 3/7/2022) 10 Patch 3    cloNIDine HCl (CATAPRES) 0.1 mg tablet Take 0.1 mg by mouth nightly.  (Patient not taking: Reported on 3/7/2022)           Vitals:    03/07/22 0923   BP: 118/77   Pulse: 96   Resp: 16   Temp: 97.1 °F (36.2 °C)   TempSrc: Temporal   SpO2: 95%   Weight: 303 lb (137.4 kg)   Height: 5' 8\" (1.727 m)   PainSc:   0 - No pain       Physical Exam  Vitals and nursing note reviewed. Constitutional:       General: He is not in acute distress. Appearance: Normal appearance. He is obese. He is not ill-appearing, toxic-appearing or diaphoretic. HENT:      Head: Normocephalic and atraumatic. Eyes:      General: No scleral icterus. Extraocular Movements: Extraocular movements intact. Conjunctiva/sclera: Conjunctivae normal.      Pupils: Pupils are equal, round, and reactive to light. Cardiovascular:      Rate and Rhythm: Normal rate and regular rhythm. Pulses: Normal pulses. Heart sounds: Normal heart sounds. No murmur heard. No gallop. Pulmonary:      Effort: Pulmonary effort is normal. No respiratory distress. Breath sounds: Normal breath sounds. No wheezing or rales. Abdominal:      General: Bowel sounds are normal. There is no distension. Palpations: Abdomen is soft. Tenderness: There is no abdominal tenderness. There is no right CVA tenderness, left CVA tenderness or guarding. Musculoskeletal:         General: No swelling or tenderness. Normal range of motion. Cervical back: Normal range of motion and neck supple. Right lower leg: Edema present. Left lower leg: Edema present. Skin:     General: Skin is warm and dry. Coloration: Skin is not jaundiced or pale. Neurological:      General: No focal deficit present. Mental Status: He is alert and oriented to person, place, and time. Mental status is at baseline. Cranial Nerves: No cranial nerve deficit. Motor: No weakness. Gait: Gait normal.   Psychiatric:         Mood and Affect: Mood normal.         Behavior: Behavior normal.         Thought Content:  Thought content normal. Judgment: Judgment normal.         Office Visit on 03/07/2022   Component Date Value Ref Range Status    Color (UA POC) 03/07/2022 Yellow   Final    Clarity (UA POC) 03/07/2022 Clear   Final    Glucose (UA POC) 03/07/2022 Trace  Negative Final    Bilirubin (UA POC) 03/07/2022 1+  Negative Final    Ketones (UA POC) 03/07/2022 Negative  Negative Final    Specific gravity (UA POC) 03/07/2022 1.030  1.001 - 1.035 Final    Blood (UA POC) 03/07/2022 3+  Negative Final    pH (UA POC) 03/07/2022 6.0  4.6 - 8.0 Final    Protein (UA POC) 03/07/2022 2+  Negative Final    Urobilinogen (UA POC) 03/07/2022 0.2 mg/dL  0.2 - 1 Final    Nitrites (UA POC) 03/07/2022 Positive  Negative Final    Leukocyte esterase (UA POC) 03/07/2022 Trace  Negative Final       Results for orders placed or performed in visit on 03/07/22   AMB POC URINALYSIS DIP STICK AUTO W/O MICRO   Result Value Ref Range    Color (UA POC) Yellow     Clarity (UA POC) Clear     Glucose (UA POC) Trace Negative    Bilirubin (UA POC) 1+ Negative    Ketones (UA POC) Negative Negative    Specific gravity (UA POC) 1.030 1.001 - 1.035    Blood (UA POC) 3+ Negative    pH (UA POC) 6.0 4.6 - 8.0    Protein (UA POC) 2+ Negative    Urobilinogen (UA POC) 0.2 mg/dL 0.2 - 1    Nitrites (UA POC) Positive Negative    Leukocyte esterase (UA POC) Trace Negative       Patient Care Team:  Patient Care Team:  José Manuel Cox MD as PCP - General (Internal Medicine)  José Manuel Cox MD as PCP - [facility] Provider  Adelina Landa NP as Nurse Practitioner (Nurse Practitioner)      Assessment / Plan:      ICD-10-CM ICD-9-CM    1. Acute cystitis with hematuria  N30.01 595.0 AMB POC URINALYSIS DIP STICK AUTO W/O MICRO      levoFLOXacin (LEVAQUIN) 500 mg tablet   2. Chronic fatigue  R53.82 780.79 TSH 3RD GENERATION      T4, FREE      VITAMIN D, 25 HYDROXY      METABOLIC PANEL, COMPREHENSIVE   3. FRANCISCA (obstructive sleep apnea)  G47.33 327.23 SLEEP MEDICINE REFERRAL   4. Benign prostatic hyperplasia with incomplete bladder emptying  N40.1 600.01 US PELV NON OBS  LTD    R39.14 788.21 PSA SCREENING (SCREENING)      DISCONTINUED: tamsulosin (FLOMAX) 0.4 mg capsule   5. HSV-2 (herpes simplex virus 2) infection  B00.9 054.9 valACYclovir (VALTREX) 500 mg tablet   6. Chronic bronchitis, unspecified chronic bronchitis type (HCC)  J42 491.9 albuterol (PROVENTIL HFA, VENTOLIN HFA, PROAIR HFA) 90 mcg/actuation inhaler     Acute cystitis with hematuria: UA is positive for trace leukocytes, nitrites, blood. Will treat with levofloxacin for 5 days. Patient has been reporting urinary frequency, nocturia, weak stream and incomplete bladder emptying since the past few years. Likely has BPH. Will check PSA, ultrasound bladder postvoid residual.  Check CMP. Start tamsulosin daily to see if there is any improvement. Patient reporting chronic fatigue. Likely due to untreated FRANCISCA. Check TSH/T4, vitamin D levels. Will refer to sleep medicine. Refilled Valtrex for HSV-2 infection recurrence. Refill Albuterol for chronic bronchitis. Advised to follow up with PCP: Dr. Huong Lovelace in 8 weeks. I asked the patient if he  had any questions and answered his  questions. The patient stated that he understands the treatment plan and agrees with the treatment plan    This document was created with a voice activated dictation system and may contain transcription errors.

## 2022-03-09 ENCOUNTER — HOSPITAL ENCOUNTER (OUTPATIENT)
Dept: LAB | Age: 42
Discharge: HOME | End: 2022-03-09

## 2022-03-09 LAB — SENTARA SPECIMEN COL,SENBCF: NORMAL

## 2022-03-09 PROCEDURE — 99001 SPECIMEN HANDLING PT-LAB: CPT

## 2022-03-10 ENCOUNTER — TELEPHONE (OUTPATIENT)
Dept: FAMILY MEDICINE CLINIC | Age: 42
End: 2022-03-10

## 2022-03-10 DIAGNOSIS — R97.20 ELEVATED PSA, LESS THAN 10 NG/ML: ICD-10-CM

## 2022-03-10 DIAGNOSIS — E55.9 VITAMIN D DEFICIENCY: Primary | ICD-10-CM

## 2022-03-10 LAB
25(OH)D3 SERPL-MCNC: 19.6 NG/ML (ref 32–100)
A-G RATIO,AGRAT: 1.6 RATIO (ref 1.1–2.6)
ALBUMIN SERPL-MCNC: 4.1 G/DL (ref 3.5–5)
ALP SERPL-CCNC: 86 U/L (ref 25–115)
ALT SERPL-CCNC: 14 U/L (ref 5–40)
ANION GAP SERPL CALC-SCNC: 13 MMOL/L (ref 3–15)
AST SERPL W P-5'-P-CCNC: 31 U/L (ref 10–37)
BILIRUB SERPL-MCNC: 0.3 MG/DL (ref 0.2–1.2)
BUN SERPL-MCNC: 10 MG/DL (ref 6–22)
CALCIUM SERPL-MCNC: 9.2 MG/DL (ref 8.4–10.5)
CHLORIDE SERPL-SCNC: 101 MMOL/L (ref 98–110)
CO2 SERPL-SCNC: 25 MMOL/L (ref 20–32)
CREAT SERPL-MCNC: 0.7 MG/DL (ref 0.5–1.2)
GFRAA, 66117: >60
GFRNA, 66118: >60
GLOBULIN,GLOB: 2.5 G/DL (ref 2–4)
GLUCOSE SERPL-MCNC: 143 MG/DL (ref 70–99)
POTASSIUM SERPL-SCNC: 4.5 MMOL/L (ref 3.5–5.5)
PROT SERPL-MCNC: 6.6 G/DL (ref 6.4–8.3)
PSA SERPL-MCNC: 6.53 NG/ML
SODIUM SERPL-SCNC: 139 MMOL/L (ref 133–145)
T4 FREE SERPL-MCNC: 1.3 NG/DL (ref 0.9–1.8)
TSH SERPL DL<=0.005 MIU/L-ACNC: 3.14 MCU/ML (ref 0.27–4.2)

## 2022-03-10 RX ORDER — ERGOCALCIFEROL 1.25 MG/1
50000 CAPSULE ORAL
Qty: 6 CAPSULE | Refills: 0 | Status: SHIPPED | OUTPATIENT
Start: 2022-03-10 | End: 2022-05-13 | Stop reason: SDUPTHER

## 2022-03-10 NOTE — TELEPHONE ENCOUNTER
Discussed lab results over the phone. Patient noted to have Vit D deficiency. Will prescribe ergocalciferol [id number] for 6 weeks. Patient complaining of fatigue - thyroid testing was normal. I think this is likely due to untreated FRANCISCA. He has been referred to sleep medicine. Patient states UTI symptoms have improved with antibiotic. Prostate levels are elevated but might not be reliable with active infection. Will repeat again once infection resolves. Noted to have hyperglycemia in BMP. Patient has a history of pre-diabetes. Needs repeat HbA1c testing. It was 5.8% in 2018. Patient has follow up appt with Dr. Brianna Pritchett on 5/9/22.

## 2022-03-10 NOTE — TELEPHONE ENCOUNTER
Patient called requesting his lab results be discussed with him.  Would like a call back @ [phone]

## 2022-03-17 ENCOUNTER — TELEPHONE (OUTPATIENT)
Dept: FAMILY MEDICINE CLINIC | Age: 42
End: 2022-03-17

## 2022-03-17 NOTE — TELEPHONE ENCOUNTER
Patient is requesting something for chest congestions, fatigue, and nasal congestion. Patient did test positive for Covid 3 weeks ago.  Please advise

## 2022-03-18 PROBLEM — F33.1 MODERATE EPISODE OF RECURRENT MAJOR DEPRESSIVE DISORDER (HCC): Status: ACTIVE | Noted: 2017-12-02

## 2022-03-18 PROBLEM — I10 PRIMARY HYPERTENSION: Status: ACTIVE | Noted: 2017-03-28

## 2022-03-18 LAB — SARS-COV-2, NAA: NEGATIVE

## 2022-03-19 DIAGNOSIS — U07.1 COVID-19: ICD-10-CM

## 2022-03-19 DIAGNOSIS — J06.9 UPPER RESPIRATORY INFECTION WITH COUGH AND CONGESTION: Primary | ICD-10-CM

## 2022-03-19 PROBLEM — M17.11 OSTEOARTHRITIS OF RIGHT KNEE: Status: ACTIVE | Noted: 2018-09-30

## 2022-03-19 PROBLEM — B00.9 HSV-2 (HERPES SIMPLEX VIRUS 2) INFECTION: Status: ACTIVE | Noted: 2022-03-07

## 2022-03-19 PROBLEM — E66.01 MORBID OBESITY WITH BMI OF 40.0-44.9, ADULT (HCC): Status: ACTIVE | Noted: 2017-09-26

## 2022-03-19 PROBLEM — J42 CHRONIC BRONCHITIS (HCC): Status: ACTIVE | Noted: 2022-03-07

## 2022-03-19 PROBLEM — R39.14 BENIGN PROSTATIC HYPERPLASIA WITH INCOMPLETE BLADDER EMPTYING: Status: ACTIVE | Noted: 2022-03-07

## 2022-03-19 PROBLEM — R53.82 CHRONIC FATIGUE: Status: ACTIVE | Noted: 2022-03-07

## 2022-03-19 PROBLEM — E55.9 VITAMIN D DEFICIENCY: Status: ACTIVE | Noted: 2022-03-10

## 2022-03-19 PROBLEM — F90.0 ATTENTION DEFICIT HYPERACTIVITY DISORDER (ADHD), PREDOMINANTLY INATTENTIVE TYPE: Status: ACTIVE | Noted: 2017-12-02

## 2022-03-19 PROBLEM — N40.1 BENIGN PROSTATIC HYPERPLASIA WITH INCOMPLETE BLADDER EMPTYING: Status: ACTIVE | Noted: 2022-03-07

## 2022-03-19 PROBLEM — Z98.84 S/P GASTRIC BYPASS: Status: ACTIVE | Noted: 2017-09-26

## 2022-03-19 PROBLEM — G47.33 OSA (OBSTRUCTIVE SLEEP APNEA): Status: ACTIVE | Noted: 2022-03-07

## 2022-03-19 RX ORDER — GUAIFENESIN, PSEUDOEPHEDRINE HYDROCHLORIDE 600; 60 MG/1; MG/1
1 TABLET, EXTENDED RELEASE ORAL EVERY 12 HOURS
Qty: 14 TABLET | Refills: 1 | Status: SHIPPED | OUTPATIENT
Start: 2022-03-19 | End: 2022-05-13

## 2022-03-19 NOTE — PROGRESS NOTES
Patient called with a history of cough congestion and states that he tested positive for COVID-19. Will treat symptoms. Should be aware of isolation recommendations.

## 2022-03-25 LAB — SENTARA SPECIMEN COL,SENBCF: NORMAL

## 2022-03-25 PROCEDURE — 99001 SPECIMEN HANDLING PT-LAB: CPT

## 2022-04-07 ENCOUNTER — HOSPITAL ENCOUNTER (OUTPATIENT)
Dept: ULTRASOUND IMAGING | Age: 42
Discharge: HOME | End: 2022-04-07
Attending: STUDENT IN AN ORGANIZED HEALTH CARE EDUCATION/TRAINING PROGRAM
Payer: COMMERCIAL

## 2022-04-07 DIAGNOSIS — R39.14 BENIGN PROSTATIC HYPERPLASIA WITH INCOMPLETE BLADDER EMPTYING: ICD-10-CM

## 2022-04-07 DIAGNOSIS — N40.1 BENIGN PROSTATIC HYPERPLASIA WITH INCOMPLETE BLADDER EMPTYING: ICD-10-CM

## 2022-04-07 PROCEDURE — 76857 US EXAM PELVIC LIMITED: CPT

## 2022-05-06 NOTE — PROGRESS NOTES
ICD-10-CM ICD-9-CM    1. Elevated PSA, less than 10 ng/ml  R97.20 790.93    2. Primary hypertension  I10 401.9    3. Benign prostatic hyperplasia with incomplete bladder emptying  N40.1 600.01     R39.14 788.21    4. Chronic fatigue  R53.82 780.79    5. Attention deficit hyperactivity disorder (ADHD), predominantly inattentive type  F90.0 314.00    6. Encounter for immunization  Z23 V03.89    7. Encounter for hepatitis C screening test for low risk patient  Z11.59 V73.89      Follow-up and Dispositions    · Return in about 3 months (around 8/9/2022). Assessment and plan  Nasal congestion has resolved. Elevated PSA. Will refer to urology. Urology  Elevated glucose of 143. Follow-up labs before next visit. Slightly low vitamin D. Recommend he get over-the-counter vitamin D. History of obstructive sleep apnea. Followed up with specialist.  Scheduled for probable CPAP use. Unhealthy weight. Mediterranean diet given. If not successful will start on medication. Screen for hep C. Lab results and schedule of future lab studies reviewed with patient  Diagnostic and radiologic results and the schedule of future studies were reviewed with the patient  Reviewed diet, exercise and weight control  All questions answered and understood. Subjective:   Jenna Beasley is a 38 y.o. male who is being seen in follow-up. The patient has Colon polyp 8/05, Anxiety, Dyslipidemia, Primary hypertension, S/P gastric bypass 3/10 Dr James Gomez, Morbid obesity with BMI of 40.0-44.9, adult ([address] Moderate episode of recurrent major depressive disorder ([address] Attention deficit hyperactivity disorder (ADHD), predominantly inattentive type, Osteoarthritis of right knee, FRANCISCA (obstructive sleep apnea), Chronic fatigue, HSV-2 (herpes simplex virus 2) infection, Benign prostatic hyperplasia with incomplete bladder emptying, Chronic bronchitis ([address] and Vitamin D deficiency on their problem list..  Seen by another provider 3/7/2022 acute cystitis with hematuria, chronic fatigue, obstructive sleep apnea, BPH with incomplete emptying on Flomax, history of HSV-2 on Valtrex, chronic bronchitis. Treated with Levaquin. Referred to sleep medicine. An ultrasound of the bladder showed a small amount of postvoid residual.   Seen previously by our practice on 12/20/2021  Labs 3/9/2022 glucose 143 otherwise CMP within normal limits. PSA [id number]  The patient presents with acute exacerbation of chronic bronchitis. Most likely infectious in the PE daughter and wife also has similar symptoms. He does have a underlying history of asthma as well as obstructive sleep apnea. He is a non-smoker. Continue inhaler. Add Z-Ventura and prednisone. Call if not improving. Primary hypertension doing very well continue present regimen. Attention deficit disorder patient states that it stable with no exacerbations and that there is no problem with the medication. Continue present regimen. Will address health maintenance on next visit. Lab results and schedule of future lab studies reviewed with patient  Diagnostic and radiologic results and the schedule of future studies were reviewed with the patient  All questions answered and understood. The last visit was 3/24/2021. We last saw this gentleman in December 2020 for ADHD, hypertension, obstructive sleep apnea, and screening for cholesterol level. Labs are pending. Respiratory complaints  Six week history of what he describes as congestion in the nose and chest with difficulty moving air. There is a mild cough with some phlegm production. No fevers or chills admitted to. Wife and daughter also had the symptoms. They went to the doctor and had z pack and prednisone and improved. He states that this is a partially seasonal occurrence. He continues to use his albuterol inhaler with some improvement. He is a non-smoker. He does dip snuff. Hypertension  The patient has had no problem with the medication.  The patient has no headaches, visual changes, chest pain or pressure,dyspnea, orthopnea, abdominal pain, dysuria, weakness, or paresthesias. 124/83  BP Readings from Last 3 Encounters:   05/09/22 136/86   03/07/22 118/77   03/19/19 118/78       ADHD  The patient states that the medication for his ADHD is working well. 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 He is able to finish tasks. He does not have flight of ideas. He is tolerating the medication well. There have been no side effects. Obstructive sleep apnea  Did sleep study in [place], within the last 3 months. Not on CPAP machine yet. He states that he was told that he had mild to moderate sleep apnea and was placed on a CPAP machine. He is having trouble tolerating it at night so he is only using it sporadically. After discussion with him, he states that he will call the specialist to have his machine adjusted for him. Overweight  The patient states that the weight has fluctuated . Patient's diet  Heavy on sodas. The patient denies edema . Obesity comorbid conditions include high blood pressure   body mass index is 47.44 kg/m². Wt Readings from Last 3 Encounters:   05/09/22 312 lb (141.5 kg)   03/07/22 303 lb (137.4 kg)   03/19/19 277 lb (125.6 kg)       No results found for this visit on 05/09/22 (from the past 6 hour(s)). Lab Results   Component Value Date/Time    TSH 3.14 03/09/2022 08:29 AM         Review of Systems   Constitutional: Positive for malaise/fatigue. Negative for fever. HENT: Positive for congestion and sinus pain. Negative for ear discharge, hearing loss, nosebleeds and sore throat. Respiratory: Negative for shortness of breath, wheezing (History of childhood asthma. He uses his albuterol inhaler about once a year) and stridor. Cardiovascular: Negative for chest pain. Gastrointestinal:        Recurrent hemorrhoid treated with cream.   Genitourinary: Negative for dysuria.    Musculoskeletal: Negative for myalgias. Neurological: Negative for dizziness. Psychiatric/Behavioral: Positive for depression. Negative for suicidal ideas. The patient is not nervous/anxious. Health Maintenance Due   Topic Date Due    Hepatitis C Screening  Never done    Pneumococcal 0-64 years (1 - PCV) Never done    DTaP/Tdap/Td series (1 - Tdap) Never done    COVID-19 Vaccine (3 - Booster for Pfizer series) 04/02/2022         Current Outpatient Medications   Medication Sig    losartan (COZAAR) 50 mg tablet take 1 tablet by mouth once daily    ergocalciferol (ERGOCALCIFEROL) 1,250 mcg (50,000 unit) capsule Take 1 Capsule by mouth every seven (7) days.  albuterol (PROVENTIL HFA, VENTOLIN HFA, PROAIR HFA) 90 mcg/actuation inhaler Take 2 Puffs by inhalation every four (4) hours as needed for Wheezing or Shortness of Breath.  valACYclovir (VALTREX) 500 mg tablet Take 1 Tablet by mouth as needed (Herpetic flare ups).  tamsulosin (FLOMAX) 0.4 mg capsule TAKE 2 CAPSULES BY MOUTH DAILY    dextroamphetamine-amphetamine (ADDERALL) 30 mg tablet Take 1 Tab by mouth two (2) times a day. Max Daily Amount: 2 Tabs.  escitalopram oxalate (LEXAPRO) 20 mg tablet Take 20 mg by mouth daily.  buPROPion XL (WELLBUTRIN XL) 150 mg tablet Take 300 mg by mouth every morning.  cyanocobalamin (VITAMIN B-12) 1,000 mcg sublingual tablet Take 1,000 mcg by mouth daily.  traZODone (DESYREL) 50 mg tablet Take 50 mg by mouth nightly.  PSEUDOEPHEDRINE-guaiFENesin (Mucinex D)  mg per tablet Take 1 Tablet by mouth every twelve (12) hours. (Patient not taking: Reported on 5/9/2022)    hydrocortisone (ANUSOL-HC) 2.5 % rectal cream Insert  into rectum four (4) times daily. (Patient not taking: Reported on 5/9/2022)     No current facility-administered medications for this visit.      Allergies   Allergen Reactions    Lisinopril Cough    Nsaids (Non-Steroidal Anti-Inflammatory Drug) Other (comments)     H/o PUD and gastric bypass     has Colon polyp 8/05, Anxiety, Dyslipidemia, Primary hypertension, S/P gastric bypass 3/10 Dr Betty Montes, Morbid obesity with BMI of 40.0-44.9, adult ([facility] [address] Moderate episode of recurrent major depressive disorder ([facility] [address] Attention deficit hyperactivity disorder (ADHD), predominantly inattentive type, Osteoarthritis of right knee, FRANCISCA (obstructive sleep apnea), Chronic fatigue, HSV-2 (herpes simplex virus 2) infection, Benign prostatic hyperplasia with incomplete bladder emptying, Chronic bronchitis ([facility] [address] and Vitamin D deficiency on their problem list.  Past Surgical History:   Procedure Laterality Date    BIOPSY LIVER      Dr Mateo Holloway; saw [id number] then Dr Mckeon Gene later    HX CHOLECYSTECTOMY  9/08    Dr Jay Jay Hickey      2002 negative, last 2005 proctitis Dr Tena Chapincito  3/10    Lap Cherri-en-Y BMI 47 Dr Betty Montes preop weight 367 lbs    HX ORTHOPAEDIC Left     left hip Dr Bernstein Portconchitaous age 13    HX ORTHOPAEDIC Right 10/2018    partial knee replacement     NE CARDIAC SURG PROCEDURE UNLIST  03/2017    echo nl lv, ef 60%, mild aortic root dilation      reports that he has quit smoking. His smokeless tobacco use includes chew. He reports that he does not drink alcohol and does not use drugs. family history includes Cancer in his maternal grandmother and paternal grandfather; Depression in his father and mother; Diabetes in his paternal grandfather; Elevated Lipids in his father; Heart Disease in his mother and paternal grandfather; Hypertension in his father; OSTEOARTHRITIS in his mother; Stroke in his maternal grandfather. Physical Exam  Constitutional:       General: He is not in acute distress. HENT:      Right Ear: External ear normal.      Left Ear: External ear normal.      Nose: Nose normal.      Mouth/Throat:      Mouth: Mucous membranes are moist.      Pharynx: Oropharynx is clear. Eyes:      General: No scleral icterus. Extraocular Movements: Extraocular movements intact. Pupils: Pupils are equal, round, and reactive to light. Pulmonary:      Effort: Pulmonary effort is normal.      Comments: Slightly rapid respiratory rate of 22/min on my exam  Musculoskeletal:         General: No swelling. Right lower leg: No edema. Left lower leg: No edema. Skin:     Coloration: Skin is not jaundiced. Findings: No erythema. Neurological:      Mental Status: He is alert and oriented to person, place, and time.       Coordination: Coordination normal.      Gait: Gait normal.   Psychiatric:         Mood and Affect: Mood normal.         Behavior: Behavior normal.         Lab Results   Component Value Date/Time    WBC 8.2 01/25/2019 04:48 AM    HGB 14.0 01/25/2019 04:48 AM    HCT 44.6 01/25/2019 04:48 AM    PLATELET 703 10/68/1736 04:48 AM    MCV 96 (H) 01/25/2019 04:48 AM     Lab Results   Component Value Date/Time    Hemoglobin A1c 5.8 (H) 09/04/2018 01:20 PM    Hemoglobin A1c 5.7 03/31/2017 12:00 PM    Glucose 143 (H) 03/09/2022 08:29 AM    Glucose (POC) 93 10/01/2018 11:20 AM    LDL, calculated 74 01/25/2019 04:48 AM    Creatinine 0.7 03/09/2022 08:29 AM      Lab Results   Component Value Date/Time    Cholesterol, total 139 01/25/2019 04:48 AM    HDL Cholesterol 45 01/25/2019 04:48 AM    LDL, calculated 74 01/25/2019 04:48 AM    Triglyceride 100 01/25/2019 04:48 AM    CHOL/HDL Ratio 4.0 03/08/2011 07:39 AM     Lab Results   Component Value Date/Time    Sodium 139 03/09/2022 08:29 AM    Potassium 4.5 03/09/2022 08:29 AM    Chloride 101 03/09/2022 08:29 AM    CO2 25 03/09/2022 08:29 AM    Anion gap 13.0 03/09/2022 08:29 AM    Glucose 143 (H) 03/09/2022 08:29 AM    BUN 10 03/09/2022 08:29 AM    Creatinine 0.7 03/09/2022 08:29 AM    BUN/Creatinine ratio 12 10/02/2018 02:40 AM    GFR est AA >60 10/02/2018 02:40 AM    GFR est non-AA >60 10/02/2018 02:40 AM    Calcium 9.2 03/09/2022 08:29 AM         We discussed the expected course, resolution and complications of the diagnosis(es) in detail. Medication risks, benefits, costs, interactions, and alternatives were discussed as indicated. I advised him to contact the office if his condition worsens, changes or fails to improve as anticipated. He expressed understanding with the diagnosis(es) and plan. This note was done with the assistance of dragon speech software.   Some inadvertent errors or omissions may be present

## 2022-05-08 DIAGNOSIS — I10 PRIMARY HYPERTENSION: ICD-10-CM

## 2022-05-08 RX ORDER — LOSARTAN POTASSIUM 50 MG/1
TABLET ORAL
Qty: 90 TABLET | Refills: 3 | Status: SHIPPED | OUTPATIENT
Start: 2022-05-08 | End: 2022-06-07 | Stop reason: SDUPTHER

## 2022-05-08 NOTE — PATIENT INSTRUCTIONS
Learning About High Blood Pressure  What is high blood pressure? Blood pressure is a measure of how hard the blood pushes against the walls of your arteries. It's normal for blood pressure to go up and down throughout the day. But if it stays up, you have high blood pressure. Another name for high blood pressure is hypertension. Two numbers tell you your blood pressure. The first number is the systolic pressure (top number). It shows how hard the blood pushes when your heart is pumping. The second number is the diastolic pressure (bottom number). It shows how hard the blood pushes between heartbeats, when your heart is relaxed and filling with blood. Your doctor will give you a goal for your blood pressure based on your health and your age. High blood pressure (hypertension) means that the top number stays high, or the bottom number stays high, or both. High blood pressure increases the risk of stroke, heart attack, and other problems. What happens when you have high blood pressure? · Blood flows through your arteries with too much force. Over time, this can damage the heart and the walls of your arteries. But you can't feel it. High blood pressure usually doesn't cause symptoms. · High blood pressure makes your heart work harder. And that can lead to heart failure, which means your heart doesn't pump as much blood as your body needs. · Fat and calcium start to build up in your arteries. This buildup is called hardening of the arteries. It can cause many problems including a heart attack and stroke. · Arteries also carry blood and oxygen to organs like your eyes, kidneys, and brain. If high blood pressure damages those arteries, it can lead to vision loss, kidney disease, stroke, and a higher risk of dementia. How can you prevent high blood pressure? · Stay at a healthy weight. · Try to limit how much sodium you eat to less than 2,300 milligrams (mg) a day.  If you limit your sodium to 1,500 mg a day, you can lower your blood pressure even more. ? Buy foods that are labeled \"unsalted,\" \"sodium-free,\" or \"low-sodium. \" Foods labeled \"reduced-sodium\" and \"light sodium\" may still have too much sodium. ? Flavor your food with garlic, lemon juice, onion, vinegar, herbs, and spices instead of salt. Do not use soy sauce, steak sauce, onion salt, garlic salt, mustard, or ketchup on your food. ? Use less salt (or none) when recipes call for it. You can often use half the salt a recipe calls for without losing flavor. · Be physically active. Get at least 30 minutes of exercise on most days of the week. Walking is a good choice. You also may want to do other activities, such as running, swimming, cycling, or playing tennis or team sports. · Limit alcohol to 2 drinks a day for men and 1 drink a day for women. · Eat plenty of fruits, vegetables, and low-fat dairy products. Eat less saturated and total fats. How is high blood pressure treated? · Your doctor will suggest making lifestyle changes to help your heart. For example, your doctor may ask you to eat healthy foods, quit smoking, lose extra weight, and be more active. · If lifestyle changes don't help enough, your doctor may recommend that you take medicine. · When blood pressure is very high, medicines are needed to lower it. Follow-up care is a key part of your treatment and safety. Be sure to make and go to all appointments, and call your doctor if you are having problems. It's also a good idea to know your test results and keep a list of the medicines you take. Where can you learn more? Go to http://www.[employer].com/  Enter P501 in the search box to learn more about \"Learning About High Blood Pressure. \"  Current as of: January 10, 2022               Content Version: 13.2  © 1433-1526 Healthwise, Incorporated.    Care instructions adapted under license by [employer] (which disclaims liability or warranty for this information). If you have questions about a medical condition or this instruction, always ask your healthcare professional. [address] any warranty or liability for your use of this information. Attention Deficit Hyperactivity Disorder (ADHD) in Adults: Care Instructions  Your Care Instructions     Attention deficit hyperactivity disorder, or ADHD, is a condition that makes it hard to pay attention. So you may have problems when you try to focus, get organized, and finish tasks. It might make you more active than other people. Or you might do things without thinking first.  ADHD is very common. It usually starts in early childhood. Many adults don't realize they have it until their children are diagnosed. Then they become aware of their own symptoms. Doctors don't know what causes ADHD. But it often runs in families. ADHD can be treated with medicines, behavior training, and counseling. Treatment can improve your life. Follow-up care is a key part of your treatment and safety. Be sure to make and go to all appointments, and call your doctor if you are having problems. It's also a good idea to know your test results and keep a list of the medicines you take. How can you care for yourself at home? · Learn all you can about ADHD. This will help you and your family understand it better. · Take your medicines exactly as prescribed. Call your doctor if you think you are having a problem with your medicine. You will get more details on the specific medicines your doctor prescribes. · If you miss a dose of your medicine, do not take an extra dose. · If your doctor suggests counseling, find a counselor you like and trust. Talk openly and honestly. Be willing to make some changes. · Find a support group for adults with ADHD. Talking to others with the same problems can help you feel better. It can also give you ideas about how to best cope with the condition.   · Get rid of distractions at your work space. Keep your desk clean. Try not to face a window or busy hallway. · Use files, planners, and other tools to keep you organized. · Limit use of alcohol, and do not use illegal drugs. People with ADHD tend to develop substance use disorder more easily than others. Tell your doctor if you need help to quit. Counseling, support groups, and sometimes medicines can help you stay free of alcohol or drugs. · Get at least 30 minutes of physical activity on most days of the week. Exercise has been shown to help people cope with ADHD. Walking is a good choice. You also may want to do other activities, such as running, swimming, cycling, or playing tennis or team sports. When should you call for help? Watch closely for changes in your health, and be sure to contact your doctor if:    · You feel sad a lot or cry all the time.     · You have trouble sleeping, or you sleep too much.     · You find it hard to concentrate, make decisions, or remember things.     · You change how you normally eat.     · You feel guilty for no reason. Where can you learn more? Go to http://www.poole.com/  Enter B196 in the search box to learn more about \"Attention Deficit Hyperactivity Disorder (ADHD) in Adults: Care Instructions. \"  Current as of: June 16, 2021               Content Version: 13.2  © 2006-2022 Healthwise, Incorporated. Care instructions adapted under license by [employer] (which disclaims liability or warranty for this information). If you have questions about a medical condition or this instruction, always ask your healthcare professional. [place] [address] any warranty or liability for your use of this information. Learning About the [address] Diet  What is the Mediterranean diet? The Mediterranean diet is a style of eating rather than a diet plan.  It features foods eaten in [place], [place], [place], and other countries along the [facility]. It emphasizes eating foods like fish, fruits, vegetables, beans, high-fiber breads and whole grains, nuts, and olive oil. This style of eating includes limited red meat, cheese, and sweets. Why choose the Mediterranean diet? A Mediterranean-style diet may improve heart health. It contains more fat than other heart-healthy diets. But the fats are mainly from nuts, unsaturated oils (such as fish oils and olive oil), and certain nut or seed oils (such as canola, soybean, or flaxseed oil). These fats may help protect the heart and blood vessels. How can you get started on the Mediterranean diet? Here are some things you can do to switch to a more Mediterranean way of eating. What to eat  · Eat a variety of fruits and vegetables each day, such as grapes, blueberries, tomatoes, broccoli, peppers, figs, olives, spinach, eggplant, beans, lentils, and chickpeas. · Eat a variety of whole-grain foods each day, such as oats, brown rice, and whole wheat bread, pasta, and couscous. · Eat fish at least 2 times a week. Try tuna, salmon, mackerel, lake trout, herring, or sardines. · Eat moderate amounts of low-fat dairy products, such as milk, cheese, or yogurt. · Eat moderate amounts of poultry and eggs. · Choose healthy (unsaturated) fats, such as nuts, olive oil, and certain nut or seed oils like canola, soybean, and flaxseed. · Limit unhealthy (saturated) fats, such as butter, palm oil, and coconut oil. And limit fats found in animal products, such as meat and dairy products made with whole milk. Try to eat red meat only a few times a month in very small amounts. · Limit sweets and desserts to only a few times a week. This includes sugar-sweetened drinks like soda. The Mediterranean diet may also include red wine with your meal--1 glass each day for women and up to 2 glasses a day for men.   Tips for eating at home  · Use herbs, spices, garlic, lemon zest, and citrus juice instead of salt to add flavor to foods. · Add avocado slices to your sandwich instead of walker. · Have fish for lunch or dinner instead of red meat. Brush the fish with olive oil, and broil or grill it. · Sprinkle your salad with seeds or nuts instead of cheese. · Cook with olive or canola oil instead of butter or oils that are high in saturated fat. · Switch from 2% milk or whole milk to 1% or fat-free milk. · Dip raw vegetables in a vinaigrette dressing or hummus instead of dips made from mayonnaise or sour cream.  · Have a piece of fruit for dessert instead of a piece of cake. Try baked apples, or have some dried fruit. Tips for eating out  · Try broiled, grilled, baked, or poached fish instead of having it fried or breaded. · Ask your  to have your meals prepared with olive oil instead of butter. · Order dishes made with marinara sauce or sauces made from olive oil. Avoid sauces made from cream or mayonnaise. · Choose whole-grain breads, whole wheat pasta and pizza crust, brown rice, beans, and lentils. · Cut back on butter or margarine on bread. Instead, you can dip your bread in a small amount of olive oil. · Ask for a side salad or grilled vegetables instead of french fries or chips. Where can you learn more? Go to http://www.poole.com/  Enter O407 in the search box to learn more about \"Learning About the Mediterranean Diet. \"  Current as of: September 8, 2021               Content Version: 13.2  © 2149-4080 [employer]. Care instructions adapted under license by [employer] (which disclaims liability or warranty for this information). If you have questions about a medical condition or this instruction, always ask your healthcare professional. [address] any warranty or liability for your use of this information.            Attention Deficit Hyperactivity Disorder (ADHD) in Adults: Care Instructions  Your Care Instructions     Attention deficit hyperactivity disorder, or ADHD, is a condition that makes it hard to pay attention. So you may have problems when you try to focus, get organized, and finish tasks. It might make you more active than other people. Or you might do things without thinking first.  ADHD is very common. It usually starts in early childhood. Many adults don't realize they have it until their children are diagnosed. Then they become aware of their own symptoms. Doctors don't know what causes ADHD. But it often runs in families. ADHD can be treated with medicines, behavior training, and counseling. Treatment can improve your life. Follow-up care is a key part of your treatment and safety. Be sure to make and go to all appointments, and call your doctor if you are having problems. It's also a good idea to know your test results and keep a list of the medicines you take. How can you care for yourself at home? · Learn all you can about ADHD. This will help you and your family understand it better. · Take your medicines exactly as prescribed. Call your doctor if you think you are having a problem with your medicine. You will get more details on the specific medicines your doctor prescribes. · If you miss a dose of your medicine, do not take an extra dose. · If your doctor suggests counseling, find a counselor you like and trust. Talk openly and honestly. Be willing to make some changes. · Find a support group for adults with ADHD. Talking to others with the same problems can help you feel better. It can also give you ideas about how to best cope with the condition. · Get rid of distractions at your work space. Keep your desk clean. Try not to face a window or busy hallway. · Use files, planners, and other tools to keep you organized. · Limit use of alcohol, and do not use illegal drugs. People with ADHD tend to develop substance use disorder more easily than others.  Tell your doctor if you need help to quit. Counseling, support groups, and sometimes medicines can help you stay free of alcohol or drugs. · Get at least 30 minutes of physical activity on most days of the week. Exercise has been shown to help people cope with ADHD. Walking is a good choice. You also may want to do other activities, such as running, swimming, cycling, or playing tennis or team sports. When should you call for help? Watch closely for changes in your health, and be sure to contact your doctor if:    · You feel sad a lot or cry all the time.     · You have trouble sleeping, or you sleep too much.     · You find it hard to concentrate, make decisions, or remember things.     · You change how you normally eat.     · You feel guilty for no reason. Where can you learn more? Go to http://www.poole.com/  Enter B196 in the search box to learn more about \"Attention Deficit Hyperactivity Disorder (ADHD) in Adults: Care Instructions. \"  Current as of: June 16, 2021               Content Version: 13.2  © 2006-2022 [employer]. Care instructions adapted under license by [employer] (which disclaims liability or warranty for this information). If you have questions about a medical condition or this instruction, always ask your healthcare professional. [place] [address] any warranty or liability for your use of this information. Prostate-Specific Antigen (PSA) Test: About This Test  What is it? A prostate-specific antigen (PSA) test measures the amount of PSA in your blood. PSA is released by a man's prostate gland into his blood. A high PSA level may mean that you have an enlargement, infection, or cancer of the prostate. Why is this test done? You may have this test to:  · Check for prostate cancer. · Watch prostate cancer and see if treatment is working.   How do you prepare for the test?  Do not ejaculate during the 2 days before your PSA blood test, either during sex or masturbation. Talk to your doctor about any concerns you have regarding the need for the test, its risks, how it will be done, or what the results will mean. How is the test done? A health professional uses a needle to take a blood sample, usually from the arm. What happens after the test?  · You will probably be able to go home right away. It depends on the reason for the test.  · You can go back to your usual activities right away. Follow-up care is a key part of your treatment and safety. Be sure to make and go to all appointments, and call your doctor if you are having problems. It's also a good idea to keep a list of the medicines you take. Ask your doctor when you can expect to have your test results. Where can you learn more? Go to http://www.gray.com/  Enter H964 in the search box to learn more about \"Prostate-Specific Antigen (PSA) Test: About This Test.\"  Current as of: September 8, 2021               Content Version: 13.2  © 2006-2022 [employer]. Care instructions adapted under license by [employer] (which disclaims liability or warranty for this information). If you have questions about a medical condition or this instruction, always ask your healthcare professional. [place] [address] any warranty or liability for your use of this information. Sleep Apnea: Care Instructions  Overview     Sleep apnea means that you frequently stop breathing for 10 seconds or longer during sleep. It can be mild to severe, based on the number of times an hour that you stop breathing. Blocked or narrowed airways in your nose, mouth, or throat can cause sleep apnea. Your airway can become blocked when your throat muscles and tongue relax during sleep. You can help treat sleep apnea at home by making lifestyle changes. You also can use a CPAP breathing machine that keeps tissues in the throat from blocking your airway.  Or your doctor may suggest that you use a breathing device while you sleep. It helps keep your airway open. This could be a device that you put in your mouth. In some cases, surgery may be needed to remove enlarged tissues in the throat. Follow-up care is a key part of your treatment and safety. Be sure to make and go to all appointments, and call your doctor if you are having problems. It's also a good idea to know your test results and keep a list of the medicines you take. How can you care for yourself at home? · Lose weight, if needed. · Sleep on your side. It may help mild apnea. · Avoid alcohol and medicines such as sleeping pills, opioids, or sedatives before bed. · Don't smoke. If you need help quitting, talk to your doctor. · Prop up the head of your bed. · Treat breathing problems, such as a stuffy nose, that are caused by a cold or allergies. · Try a continuous positive airway pressure (CPAP) breathing machine if your doctor recommends it. · If CPAP doesn't work for you, ask your doctor if you can try other masks, settings, or breathing machines. · Try oral breathing devices or other nasal devices. · Talk to your doctor if your nose feels dry or bleeds, or if it gets runny or stuffy when you use a breathing machine. · Tell your doctor if you're sleepy during the day and it affects your daily life. Don't drive or operate machinery when you're drowsy. When should you call for help? Watch closely for changes in your health, and be sure to contact your doctor if:    · You still have sleep apnea even though you have made lifestyle changes.     · You are thinking of trying a device such as CPAP.     · You are having problems using a CPAP or similar machine.     · You are still sleepy during the day, and it affects your daily life. Where can you learn more?   Go to http://www.gray.com/  Enter J936 in the search box to learn more about \"Sleep Apnea: Care Instructions. \"  Current as of: July 6, 2021               Content Version: 13.2  © 2232-3844 [employer]. Care instructions adapted under license by [employer] (which disclaims liability or warranty for this information). If you have questions about a medical condition or this instruction, always ask your healthcare professional. [address] any warranty or liability for your use of this information. Learning About High Blood Pressure  What is high blood pressure? Blood pressure is a measure of how hard the blood pushes against the walls of your arteries. It's normal for blood pressure to go up and down throughout the day. But if it stays up, you have high blood pressure. Another name for high blood pressure is hypertension. Two numbers tell you your blood pressure. The first number is the systolic pressure (top number). It shows how hard the blood pushes when your heart is pumping. The second number is the diastolic pressure (bottom number). It shows how hard the blood pushes between heartbeats, when your heart is relaxed and filling with blood. Your doctor will give you a goal for your blood pressure based on your health and your age. High blood pressure (hypertension) means that the top number stays high, or the bottom number stays high, or both. High blood pressure increases the risk of stroke, heart attack, and other problems. What happens when you have high blood pressure? · Blood flows through your arteries with too much force. Over time, this can damage the heart and the walls of your arteries. But you can't feel it. High blood pressure usually doesn't cause symptoms. · High blood pressure makes your heart work harder. And that can lead to heart failure, which means your heart doesn't pump as much blood as your body needs. · Fat and calcium start to build up in your arteries. This buildup is called hardening of the arteries.  It can cause many problems including a heart attack and stroke. · Arteries also carry blood and oxygen to organs like your eyes, kidneys, and brain. If high blood pressure damages those arteries, it can lead to vision loss, kidney disease, stroke, and a higher risk of dementia. How can you prevent high blood pressure? · Stay at a healthy weight. · Try to limit how much sodium you eat to less than 2,300 milligrams (mg) a day. If you limit your sodium to 1,500 mg a day, you can lower your blood pressure even more. ? Buy foods that are labeled \"unsalted,\" \"sodium-free,\" or \"low-sodium. \" Foods labeled \"reduced-sodium\" and \"light sodium\" may still have too much sodium. ? Flavor your food with garlic, lemon juice, onion, vinegar, herbs, and spices instead of salt. Do not use soy sauce, steak sauce, onion salt, garlic salt, mustard, or ketchup on your food. ? Use less salt (or none) when recipes call for it. You can often use half the salt a recipe calls for without losing flavor. · Be physically active. Get at least 30 minutes of exercise on most days of the week. Walking is a good choice. You also may want to do other activities, such as running, swimming, cycling, or playing tennis or team sports. · Limit alcohol to 2 drinks a day for men and 1 drink a day for women. · Eat plenty of fruits, vegetables, and low-fat dairy products. Eat less saturated and total fats. How is high blood pressure treated? · Your doctor will suggest making lifestyle changes to help your heart. For example, your doctor may ask you to eat healthy foods, quit smoking, lose extra weight, and be more active. · If lifestyle changes don't help enough, your doctor may recommend that you take medicine. · When blood pressure is very high, medicines are needed to lower it. Follow-up care is a key part of your treatment and safety. Be sure to make and go to all appointments, and call your doctor if you are having problems.  It's also a good idea to know your test results and keep a list of the medicines you take. Where can you learn more? Go to http://www.[employer].com/  Enter P501 in the search box to learn more about \"Learning About High Blood Pressure. \"  Current as of: January 10, 2022               Content Version: 13.2  © 0229-3774 Healthwise, Incorporated. Care instructions adapted under license by [employer] (which disclaims liability or warranty for this information). If you have questions about a medical condition or this instruction, always ask your healthcare professional. [address] any warranty or liability for your use of this information.

## 2022-05-09 ENCOUNTER — OFFICE VISIT (OUTPATIENT)
Dept: FAMILY MEDICINE CLINIC | Age: 42
End: 2022-05-09
Payer: COMMERCIAL

## 2022-05-09 VITALS
DIASTOLIC BLOOD PRESSURE: 86 MMHG | SYSTOLIC BLOOD PRESSURE: 136 MMHG | OXYGEN SATURATION: 95 % | BODY MASS INDEX: 47.29 KG/M2 | WEIGHT: 312 LBS | RESPIRATION RATE: 16 BRPM | HEART RATE: 102 BPM | HEIGHT: 68 IN | TEMPERATURE: 98.4 F

## 2022-05-09 DIAGNOSIS — R53.82 CHRONIC FATIGUE: ICD-10-CM

## 2022-05-09 DIAGNOSIS — F90.0 ATTENTION DEFICIT HYPERACTIVITY DISORDER (ADHD), PREDOMINANTLY INATTENTIVE TYPE: ICD-10-CM

## 2022-05-09 DIAGNOSIS — Z23 ENCOUNTER FOR IMMUNIZATION: ICD-10-CM

## 2022-05-09 DIAGNOSIS — I10 PRIMARY HYPERTENSION: ICD-10-CM

## 2022-05-09 DIAGNOSIS — Z11.59 ENCOUNTER FOR HEPATITIS C SCREENING TEST FOR LOW RISK PATIENT: ICD-10-CM

## 2022-05-09 DIAGNOSIS — R73.09 ELEVATED RANDOM BLOOD GLUCOSE LEVEL: ICD-10-CM

## 2022-05-09 DIAGNOSIS — R39.14 BENIGN PROSTATIC HYPERPLASIA WITH INCOMPLETE BLADDER EMPTYING: ICD-10-CM

## 2022-05-09 DIAGNOSIS — N40.1 BENIGN PROSTATIC HYPERPLASIA WITH INCOMPLETE BLADDER EMPTYING: ICD-10-CM

## 2022-05-09 DIAGNOSIS — Z13.220 SCREENING FOR CHOLESTEROL LEVEL: ICD-10-CM

## 2022-05-09 DIAGNOSIS — R97.20 ELEVATED PSA, LESS THAN 10 NG/ML: Primary | ICD-10-CM

## 2022-05-09 PROCEDURE — 99214 OFFICE O/P EST MOD 30 MIN: CPT | Performed by: EMERGENCY MEDICINE

## 2022-05-09 NOTE — PROGRESS NOTES
Tamica Aleman is a 38 y.o. male that is here for a   Chief Complaint   Patient presents with    Follow Up Chronic Condition         1. Have you been to the ER, urgent care clinic since your last visit? Hospitalized since your last visit? yes     2. Have you seen or consulted any other health care providers outside of the [address] [place] [address] since your last visit? Include any pap smears or colon screening.  no      Health Maintenance reviewed - yes      Upcoming Appts  no      VORB: No orders of the defined types were placed in this encounter.   Sherry Lynne MD/ Bryant Lutz MA

## 2022-05-13 DIAGNOSIS — E55.9 VITAMIN D DEFICIENCY: Primary | ICD-10-CM

## 2022-05-13 RX ORDER — ERGOCALCIFEROL 1.25 MG/1
50000 CAPSULE ORAL
Qty: 12 CAPSULE | Refills: 3 | Status: SHIPPED | OUTPATIENT
Start: 2022-05-13 | End: 2022-08-08

## 2022-05-13 RX ORDER — SPIRONOLACTONE 25 MG
TABLET ORAL
Status: CANCELLED | OUTPATIENT
Start: 2022-05-13

## 2022-06-04 DIAGNOSIS — R39.14 BENIGN PROSTATIC HYPERPLASIA WITH INCOMPLETE BLADDER EMPTYING: ICD-10-CM

## 2022-06-04 DIAGNOSIS — N40.1 BENIGN PROSTATIC HYPERPLASIA WITH INCOMPLETE BLADDER EMPTYING: ICD-10-CM

## 2022-06-07 DIAGNOSIS — N40.1 BENIGN PROSTATIC HYPERPLASIA WITH INCOMPLETE BLADDER EMPTYING: ICD-10-CM

## 2022-06-07 DIAGNOSIS — I10 PRIMARY HYPERTENSION: ICD-10-CM

## 2022-06-07 DIAGNOSIS — R39.14 BENIGN PROSTATIC HYPERPLASIA WITH INCOMPLETE BLADDER EMPTYING: ICD-10-CM

## 2022-06-07 RX ORDER — LOSARTAN POTASSIUM 50 MG/1
50 TABLET ORAL DAILY
Qty: 90 TABLET | Refills: 3 | Status: SHIPPED | OUTPATIENT
Start: 2022-06-07

## 2022-06-07 RX ORDER — TAMSULOSIN HYDROCHLORIDE 0.4 MG/1
0.8 CAPSULE ORAL DAILY
Qty: 180 CAPSULE | Refills: 0 | Status: SHIPPED | OUTPATIENT
Start: 2022-06-07 | End: 2022-09-07

## 2022-06-07 RX ORDER — TAMSULOSIN HYDROCHLORIDE 0.4 MG/1
0.8 CAPSULE ORAL DAILY
Qty: 180 CAPSULE | Refills: 0 | OUTPATIENT
Start: 2022-06-07

## 2022-06-07 NOTE — TELEPHONE ENCOUNTER
Requested Prescriptions     Refused Prescriptions Disp Refills    tamsulosin (FLOMAX) 0.4 mg capsule [Pharmacy Med Name: TAMSULOSIN 0.4MG CAPSULES] 180 Capsule 0     Sig: TAKE 2 CAPSULES BY MOUTH DAILY     Refused By: Concha Tyson     Reason for Refusal: Patient no longer under provider care

## 2022-06-07 NOTE — TELEPHONE ENCOUNTER
Requested Prescriptions     Pending Prescriptions Disp Refills    tamsulosin (FLOMAX) 0.4 mg capsule 180 Capsule 0     Sig: Take 2 Capsules by mouth daily.  losartan (COZAAR) 50 mg tablet 90 Tablet 3     Sig: Take 1 Tablet by mouth daily.

## 2022-06-15 ENCOUNTER — HOSPITAL ENCOUNTER (OUTPATIENT)
Dept: LAB | Age: 42
Discharge: HOME | End: 2022-06-15

## 2022-06-15 LAB
SENTARA SPECIMEN COL,SENBCF: NORMAL
SENTARA SPECIMEN COL,SENBCF: NORMAL

## 2022-06-15 PROCEDURE — 99001 SPECIMEN HANDLING PT-LAB: CPT

## 2022-07-13 ENCOUNTER — HOSPITAL ENCOUNTER (OUTPATIENT)
Age: 42
Discharge: HOME | End: 2022-07-13
Attending: NURSE PRACTITIONER
Payer: COMMERCIAL

## 2022-07-13 DIAGNOSIS — E29.1 HYPOGONADISM IN MALE: ICD-10-CM

## 2022-07-13 PROCEDURE — A9577 INJ MULTIHANCE: HCPCS | Performed by: NURSE PRACTITIONER

## 2022-07-13 PROCEDURE — 82565 ASSAY OF CREATININE: CPT

## 2022-07-13 PROCEDURE — 70553 MRI BRAIN STEM W/O & W/DYE: CPT

## 2022-07-13 PROCEDURE — 74011250636 HC RX REV CODE- 250/636: Performed by: NURSE PRACTITIONER

## 2022-07-13 RX ADMIN — GADOBENATE DIMEGLUMINE 20 ML: 529 INJECTION, SOLUTION INTRAVENOUS at 07:27

## 2022-07-14 LAB — CREAT UR-MCNC: 0.7 MG/DL (ref 0.6–1.3)

## 2022-08-04 NOTE — PROGRESS NOTES
ICD-10-CM ICD-9-CM    1. Elevated PSA, less than 10 ng/ml  R97.20 790.93       2. Vitamin D deficiency  E55.9 268.9       3. FRANCISCA (obstructive sleep apnea)  G47.33 327.23       4. Morbid obesity with BMI of 40.0-44.9, adult ([facility] [address]  E66.01 278.01     Z68.41 V85.41       5. Encounter for hepatitis C screening test for low risk patient  Z11.59 V73.89       6. Encounter for immunization  Z23 V03.89       7. Elevated random blood glucose level  R73.09 790.29       8. Primary hypertension  I10 401.9       9. Attention deficit hyperactivity disorder (ADHD), predominantly inattentive type  F90.0 314.00         Follow-up and Dispositions    Return in about 4 months (around 12/8/2022) for Labs/diagnostics/referrals ordered this visit. Assessment and plan  Nasal congestion has resolved. Elevated PSA. Repeat level 6/15/2022 down to normal 0.704. Follow  Elevated glucose of 143. Follow-up labs ordered. Slightly low vitamin D. Follow-up lab ordered  ADHD, good control. History of obstructive sleep apnea. Awaiting the CPAP machine  Essential hypertension on losartan. Good control. Unhealthy weight. Mediterranean diet given. He did not like it we will try to [employer] if that is not successful consider using We[employer]. Already on ADHD medication  Screen for hep C. Had some firm areas where he gets testosterone shots in the abdomen. He will vary the areas. Chews tobacco.  Strongly advised him to stop. 15-pack-year history of cigarette smoking stopped 3 years ago. He is to be congratulated. Does not meet Striae criteria for LDCT  Lab results and schedule of future lab studies reviewed with patient  Diagnostic and radiologic results and the schedule of future studies were reviewed with the patient  Reviewed diet, exercise and weight control  All questions answered and understood.   Health Maintenance Due   Topic Date Due    Hepatitis C Screening  Never done    Pneumococcal 0-64 years (1 - PCV) Never done DTaP/Tdap/Td series (1 - Tdap) Never done    COVID-19 Vaccine (3 - Booster for Pfizer series) 04/02/2022       Subjective:   Digna Puente is a 38 y.o. male who is being seen in follow-up. The patient has Colon polyp 8/05, Anxiety, Dyslipidemia, Primary hypertension, S/P gastric bypass 3/10 Dr Marion Roberto, Morbid obesity with BMI of 40.0-44.9, adult ([address] Moderate episode of recurrent major depressive disorder ([address] Attention deficit hyperactivity disorder (ADHD), predominantly inattentive type, Osteoarthritis of right knee, FRANCISCA (obstructive sleep apnea), Chronic fatigue, HSV-2 (herpes simplex virus 2) infection, Benign prostatic hyperplasia with incomplete bladder emptying, Chronic bronchitis ([address] and Vitamin D deficiency on their problem list..         Seen previously by our practice on 5/9/2022  The patient had elevated PSA when it was for to urology. His glucose was 143 and we are going to follow-up labs. He had a low vitamin D. He was started on vitamin D and follow-up labs were ordered today. He was being evaluated for CPAP use for obstructive sleep apnea. Mediterranean diet is given for overweight. Labs 7/13/2022 creatinine 1.7  Seen by another provider 3/7/2022 acute cystitis with hematuria, chronic fatigue, obstructive sleep apnea, BPH with incomplete emptying on Flomax, history of HSV-2 on Valtrex, chronic bronchitis. Treated with Levaquin. Referred to sleep medicine. An ultrasound of the bladder showed a small amount of postvoid residual.   Seen previously by our practice on 12/20/2021  Labs 3/9/2022 glucose 143 otherwise CMP within normal limits. PSA [id number]  The patient presents with acute exacerbation of chronic bronchitis. Most likely infectious in the PE daughter and wife also has similar symptoms. He does have a underlying history of asthma as well as obstructive sleep apnea. He is a non-smoker. Continue inhaler. Add Z-Ventura and prednisone. Call if not improving.   Primary hypertension doing very well continue present regimen. Attention deficit disorder patient states that it stable with no exacerbations and that there is no problem with the medication. Continue present regimen. Will address health maintenance on next visit. Lab results and schedule of future lab studies reviewed with patient  Diagnostic and radiologic results and the schedule of future studies were reviewed with the patient  All questions answered and understood. The last visit was 3/24/2021. We last saw this gentleman in December 2020 for ADHD, hypertension, obstructive sleep apnea, and screening for cholesterol level. Labs are pending. Hypertension  The patient has had no problem with the medication. The patient has no headaches, visual changes, chest pain or pressure,dyspnea, orthopnea, abdominal pain, dysuria, weakness, or paresthesias. 124/83  Key CAD CHF Meds               losartan (COZAAR) 50 mg tablet Take 1 Tablet by mouth daily. BP Readings from Last 3 Encounters:   08/08/22 124/83   05/09/22 136/86   03/07/22 118/77       ADHD  The patient states that the medication for his ADHD is working well. 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 He is able to finish tasks. He does not have flight of ideas. He is tolerating the medication well. There have been no side effects. Obstructive sleep apnea  Did sleep study in [facility] [place] on back order  He states that he was told that he had mild to moderate sleep apnea and was placed on a CPAP machine. Overweight  The patient states that the weight has fluctuated . Patient's diet  Heavy on sodas. The patient denies edema . Obesity comorbid conditions include high blood pressure   body mass index is unknown because there is no height or weight on file.   Wt Readings from Last 3 Encounters:   08/08/22 312 lb (141.5 kg)   06/17/22 302 lb (137 kg)   06/01/22 312 lb (141.5 kg)       No results found for this visit on 08/08/22 (from the past 6 hour(s)). Lab Results   Component Value Date/Time    TSH 3.14 03/09/2022 08:29 AM         Review of Systems   Constitutional:  Positive for malaise/fatigue. Negative for fever. HENT:  Positive for congestion and sinus pain. Negative for ear discharge, hearing loss, nosebleeds and sore throat. Respiratory:  Negative for shortness of breath, wheezing (History of childhood asthma. He uses his albuterol inhaler about once a year) and stridor. Cardiovascular:  Negative for chest pain. Gastrointestinal:         Recurrent hemorrhoid treated with cream.   Genitourinary:  Negative for dysuria. Musculoskeletal:  Negative for myalgias. Neurological:  Negative for dizziness. Psychiatric/Behavioral:  Positive for depression. Negative for suicidal ideas. The patient is not nervous/anxious. Current Outpatient Medications   Medication Sig    testosterone cypionate (DEPOTESTOTERONE CYPIONATE) 200 mg/mL injection Inject 0.5ml SQ weekly    Syringe, Disposable, 1 mL syrg Use as directed    Needle, Disp, 18 G 18 gauge x 1\" ndle Use this needle to draw up 0.5ml Testosterone Cypionate    Needle, Disp, 25 G 25 gauge x 5/8\" ndle Use this needle to inject 0.5ml SQ weekly    tamsulosin (FLOMAX) 0.4 mg capsule Take 2 Capsules by mouth daily. losartan (COZAAR) 50 mg tablet Take 1 Tablet by mouth daily. ergocalciferol (ERGOCALCIFEROL) 1,250 mcg (50,000 unit) capsule Take 1 Capsule by mouth every seven (7) days. albuterol (PROVENTIL HFA, VENTOLIN HFA, PROAIR HFA) 90 mcg/actuation inhaler Take 2 Puffs by inhalation every four (4) hours as needed for Wheezing or Shortness of Breath. valACYclovir (VALTREX) 500 mg tablet Take 1 Tablet by mouth as needed (Herpetic flare ups). dextroamphetamine-amphetamine (ADDERALL) 30 mg tablet Take 1 Tab by mouth two (2) times a day. Max Daily Amount: 2 Tabs. escitalopram oxalate (LEXAPRO) 20 mg tablet Take 20 mg by mouth daily.     buPROPion XL (WELLBUTRIN XL) 150 mg tablet Take 300 mg by mouth every morning. cyanocobalamin (VITAMIN B-12) 1,000 mcg sublingual tablet Take 1,000 mcg by mouth daily. traZODone (DESYREL) 50 mg tablet Take 50 mg by mouth nightly. No current facility-administered medications for this visit. Allergies   Allergen Reactions    Lisinopril Cough    Nsaids (Non-Steroidal Anti-Inflammatory Drug) Other (comments)     H/o PUD and gastric bypass     has Colon polyp 8/05, Anxiety, Dyslipidemia, Primary hypertension, S/P gastric bypass 3/10 Dr Li Sullivan, Morbid obesity with BMI of 40.0-44.9, adult ([facility] [address] Moderate episode of recurrent major depressive disorder ([facility] [address] Attention deficit hyperactivity disorder (ADHD), predominantly inattentive type, Osteoarthritis of right knee, FRANCISCA (obstructive sleep apnea), Chronic fatigue, HSV-2 (herpes simplex virus 2) infection, Benign prostatic hyperplasia with incomplete bladder emptying, Chronic bronchitis ([facility] [address] and Vitamin D deficiency on their problem list.  Past Surgical History:   Procedure Laterality Date    BIOPSY LIVER      Dr Melissa Fong; saw [id number] then Dr Preet Wyatt later    [facility] - BATH CHOLECYSTECTOMY  9/08    Dr Parmar Sirsamy COLONOSCOPY      2002 negative, last 2005 proctitis Dr Maria Guadalupe Fitzgerald  3/10    Lap Cherri-en-Y BMI 47 Dr Li Sullivan preop weight 367 lbs    HX ORTHOPAEDIC Left     left hip Dr Koko Nayak age 13    HX [de-identified] Right 10/2018    partial knee replacement     SD CARDIAC SURG PROCEDURE UNLIST  03/2017    echo nl lv, ef 60%, mild aortic root dilation      reports that he has quit smoking. His smokeless tobacco use includes chew. He reports current alcohol use. He reports that he does not use drugs.   family history includes Cancer in his maternal grandmother and paternal grandfather; Depression in his father and mother; Diabetes in his paternal grandfather; Elevated Lipids in his father; Heart Disease in his mother and paternal grandfather; Hypertension in his father; OSTEOARTHRITIS in his mother; Stroke in his maternal grandfather. Visit Vitals  /83 (BP 1 Location: Right arm, BP Patient Position: Sitting, BP Cuff Size: Large adult)   Pulse (!) 111   Resp 16   Ht 5' 9\" (1.753 m)   Wt 312 lb (141.5 kg)   SpO2 95%   BMI 46.07 kg/m²       Physical Exam  Constitutional:       General: He is not in acute distress. HENT:      Right Ear: External ear normal.      Left Ear: External ear normal.      Nose: Nose normal.      Mouth/Throat:      Mouth: Mucous membranes are moist.      Pharynx: Oropharynx is clear. Eyes:      General: No scleral icterus. Extraocular Movements: Extraocular movements intact. Pupils: Pupils are equal, round, and reactive to light. Pulmonary:      Effort: Pulmonary effort is normal.      Comments: Slightly rapid respiratory rate of 22/min on my exam  Musculoskeletal:         General: No swelling. Right lower leg: No edema. Left lower leg: No edema. Skin:     Coloration: Skin is not jaundiced. Findings: No erythema. Neurological:      Mental Status: He is alert and oriented to person, place, and time. Coordination: Coordination normal.      Gait: Gait normal.   Psychiatric:         Mood and Affect: Mood normal.         Behavior: Behavior normal.           Lab Results   Component Value Date/Time    Sodium 139 03/09/2022 08:29 AM    Potassium 4.5 03/09/2022 08:29 AM    Chloride 101 03/09/2022 08:29 AM    CO2 25 03/09/2022 08:29 AM    Anion gap 13.0 03/09/2022 08:29 AM    Glucose 143 (H) 03/09/2022 08:29 AM    BUN 10 03/09/2022 08:29 AM    Creatinine 0.7 03/09/2022 08:29 AM    BUN/Creatinine ratio 12 10/02/2018 02:40 AM    GFR est AA >60 10/02/2018 02:40 AM    GFR est non-AA >60 10/02/2018 02:40 AM    Calcium 9.2 03/09/2022 08:29 AM         We discussed the expected course, resolution and complications of the diagnosis(es) in detail.   Medication risks, benefits, costs, interactions, and alternatives were discussed as indicated. I advised him to contact the office if his condition worsens, changes or fails to improve as anticipated. He expressed understanding with the diagnosis(es) and plan. This note was done with the assistance of dragon speech software.   Some inadvertent errors or omissions may be present

## 2022-08-07 NOTE — PATIENT INSTRUCTIONS
Body Mass Index: Care Instructions  Your Care Instructions     Body mass index (BMI) can help you see if your weight is raising your risk for health problems. It uses a formula to compare how much you weigh with how tall you are. A BMI lower than 18.5 is considered underweight. A BMI between 18.5 and 24.9 is considered healthy. A BMI between 25 and 29.9 is considered overweight. A BMI of 30 or higher is considered obese. If your BMI is in the normal range, it means that you have a lower risk for weight-related health problems. If your BMI is in the overweight or obese range, you may be at increased risk for weight-related health problems, such as high blood pressure, heart disease, stroke, arthritis or joint pain, and diabetes. If your BMI is in the underweight range, you may be at increased risk for health problems such as fatigue, lower protection (immunity) against illness, muscle loss, bone loss, hair loss, and hormone problems. BMI is just one measure of your risk for weight-related health problems. You may be at higher risk for health problems if you are not active, you eat an unhealthy diet, or you drink too much alcohol or use tobacco products. Follow-up care is a key part of your treatment and safety. Be sure to make and go to all appointments, and call your doctor if you are having problems. It's also a good idea to know your test results and keep a list of the medicines you take. How can you care for yourself at home? Practice healthy eating habits. This includes eating plenty of fruits, vegetables, whole grains, lean protein, and low-fat dairy. If your doctor recommends it, get more exercise. Walking is a good choice. Bit by bit, increase the amount you walk every day. Try for at least 30 minutes on most days of the week. Do not smoke. Smoking can increase your risk for health problems. If you need help quitting, talk to your doctor about stop-smoking programs and medicines.  These can EMERGENCY DEPARTMENT HISTORY AND PHYSICAL EXAM      Date: 2/13/2020  Patient Name: Eduard Rodriguez    History of Presenting Illness     Chief Complaint   Patient presents with    Cough     green sputum seen for same  3 weeks ago treated now increased sx with cough chills shortness of breath    Shortness of Breath       History Provided By: Patient    HPI: Eduard Rodriguez, 44 y.o. female presents ambulatory to the Emergency Dept with c/o cough productive of green sputum. She reports her cough began approx 3 weeks ago and she was evaluated at that time and provided with an inhaler, steroids, and Zithromax. The patient states she felt her symptoms improved with use of steroids, but returned after they were complete. She is a smoker. She also advised her significant other was admitted to the hospital recently with PNA. She states the cough intensifies with lying flat. Pt is o/w healthy without fever, chills, ST, or N/V/D. Chief Complaint: cough productive of green sputum, chills, shortness of breath  Duration: 3 Weeks  Timing:  Acute  Location: chest  Quality: Tightness  Severity: Moderate  Modifying Factors: pt's  was recently admitted for PNA, +tobacco use  Associated Symptoms: malaise, fatigue        There are no other complaints, changes, or physical findings at this time. PCP: Padilla, MD Michelle    Current Outpatient Medications   Medication Sig Dispense Refill    doxycycline (VIBRA-TABS) 100 mg tablet Take 1 Tab by mouth two (2) times a day for 7 days. 14 Tab 0    [START ON 2/14/2020] predniSONE (STERAPRED DS) 10 mg dose pack Take as directed 21 Tab 0    HYDROcodone-homatropine (HYDROMET) 5-1.5 mg/5 mL syrup Take 5 mL by mouth four (4) times daily as needed for Cough for up to 3 days. Max Daily Amount: 20 mL. 60 mL 0    ibuprofen (MOTRIN) 400 mg tablet Take 1 Tab by mouth every six (6) hours as needed for Pain.  20 Tab 0       Past History     Past Medical History:  Past Medical History: Diagnosis Date    Bulging lumbar disc     High triglycerides        Past Surgical History:  History reviewed. No pertinent surgical history. Family History:  History reviewed. No pertinent family history. Social History:  Social History     Tobacco Use    Smoking status: Current Every Day Smoker     Packs/day: 1.00     Years: 20.00     Pack years: 20.00    Smokeless tobacco: Never Used   Substance Use Topics    Alcohol use: No    Drug use: No       Allergies: Allergies   Allergen Reactions    Naprosyn [Naproxen] Nausea and Vomiting         Review of Systems   Review of Systems   Constitutional: Negative for chills and fever. HENT: Negative for congestion, rhinorrhea and sore throat. Eyes: Negative for redness and itching. Respiratory: Positive for cough, chest tightness, shortness of breath and wheezing. Cardiovascular: Negative for chest pain and palpitations. Gastrointestinal: Negative for abdominal pain, diarrhea, nausea and vomiting. Endocrine: Negative for polydipsia, polyphagia and polyuria. Genitourinary: Negative for dysuria and hematuria. Musculoskeletal: Negative for myalgias, neck pain and neck stiffness. Skin: Negative for rash and wound. Allergic/Immunologic: Negative for food allergies and immunocompromised state. Neurological: Negative for dizziness and headaches. Hematological: Negative for adenopathy. Does not bruise/bleed easily. Psychiatric/Behavioral: Negative for agitation and confusion. Physical Exam   Physical Exam  Vitals signs and nursing note reviewed. Constitutional:       General: She is not in acute distress. Appearance: She is well-developed and normal weight. She is not ill-appearing, toxic-appearing or diaphoretic. HENT:      Head: Normocephalic and atraumatic. Nose: Nose normal.      Mouth/Throat:      Mouth: Mucous membranes are moist.      Pharynx: No oropharyngeal exudate. Eyes:      General: No scleral icterus. increase your chances of quitting for good. Limit alcohol to 2 drinks a day for men and 1 drink a day for women. Too much alcohol can cause health problems. If you have a BMI higher than 25  Your doctor may do other tests to check your risk for weight-related health problems. This may include measuring the distance around your waist. A waist measurement of more than 40 inches in men or 35 inches in women can increase the risk of weight-related health problems. Talk with your doctor about steps you can take to stay healthy or improve your health. You may need to make lifestyle changes to lose weight and stay healthy, such as changing your diet and getting regular exercise. If you have a BMI lower than 18.5  Your doctor may do other tests to check your risk for health problems. Talk with your doctor about steps you can take to stay healthy or improve your health. You may need to make lifestyle changes to gain or maintain weight and stay healthy, such as getting more healthy foods in your diet and doing exercises to build muscle. Where can you learn more? Go to http://www.poole.com/  Enter S176 in the search box to learn more about \"Body Mass Index: Care Instructions. \"  Current as of: December 27, 2021               Content Version: 13.2  © 6957-7312 Healthwise, [email or web address]. Care instructions adapted under license by [employer] (which disclaims liability or warranty for this information). If you have questions about a medical condition or this instruction, always ask your healthcare professional. [place] [address] any warranty or liability for your use of this information. Learning About High Blood Pressure  What is high blood pressure? Blood pressure is a measure of how hard the blood pushes against the walls of your arteries. It's normal for blood pressure to go up and down throughout the day. But if it stays up, you have high blood pressure. Another name for high blood pressure is hypertension. Two numbers tell you your blood pressure. The first number is the systolic pressure (top number). It shows how hard the blood pushes when your heart is pumping. The second number is the diastolic pressure (bottom number). It shows how hard the blood pushes between heartbeats, when your heart is relaxed and filling with blood. Your doctor will give you a goal for your blood pressure based on your health and your age. High blood pressure (hypertension) means that the top number stays high, or the bottom number stays high, or both. High blood pressure increases the risk of stroke, heart attack, and other problems. What happens when you have high blood pressure? Blood flows through your arteries with too much force. Over time, this can damage the heart and the walls of your arteries. But you can't feel it. High blood pressure usually doesn't cause symptoms. High blood pressure makes your heart work harder. And that can lead to heart failure, which means your heart doesn't pump as much blood as your body needs. Fat and calcium start to build up in your arteries. This buildup is called hardening of the arteries. It can cause many problems including a heart attack and stroke. Arteries also carry blood and oxygen to organs like your eyes, kidneys, and brain. If high blood pressure damages those arteries, it can lead to vision loss, kidney disease, stroke, and a higher risk of dementia. How can you prevent high blood pressure? Stay at a healthy weight. Try to limit how much sodium you eat to less than 2,300 milligrams (mg) a day. If you limit your sodium to 1,500 mg a day, you can lower your blood pressure even more. Buy foods that are labeled \"unsalted,\" \"sodium-free,\" or \"low-sodium. \" Foods labeled \"reduced-sodium\" and \"light sodium\" may still have too much sodium. Flavor your food with garlic, lemon juice, onion, vinegar, herbs, and spices instead of salt. Right eye: No discharge. Left eye: No discharge. Conjunctiva/sclera: Conjunctivae normal.   Neck:      Musculoskeletal: Normal range of motion and neck supple. Thyroid: No thyromegaly. Vascular: No JVD. Trachea: No tracheal deviation. Cardiovascular:      Rate and Rhythm: Normal rate and regular rhythm. Heart sounds: Normal heart sounds. Pulmonary:      Effort: Pulmonary effort is normal. No respiratory distress. Breath sounds: Wheezing present. Comments: trace expiratory wheezes  Chest:      Chest wall: No tenderness. There is no dullness to percussion. Abdominal:      Palpations: Abdomen is soft. Tenderness: There is no abdominal tenderness. Musculoskeletal: Normal range of motion. Right lower leg: No edema. Left lower leg: No edema. Lymphadenopathy:      Cervical: No cervical adenopathy. Skin:     General: Skin is warm and dry. Coloration: Skin is not pale. Findings: No erythema. Neurological:      General: No focal deficit present. Mental Status: She is alert and oriented to person, place, and time. Motor: No abnormal muscle tone.       Coordination: Coordination normal.   Psychiatric:         Mood and Affect: Mood normal.         Behavior: Behavior normal.         Judgment: Judgment normal.         Diagnostic Study Results     Labs -     Recent Results (from the past 12 hour(s))   CBC WITH AUTOMATED DIFF    Collection Time: 02/13/20  2:27 PM   Result Value Ref Range    WBC 6.0 3.6 - 11.0 K/uL    RBC 3.99 3.80 - 5.20 M/uL    HGB 10.8 (L) 11.5 - 16.0 g/dL    HCT 33.2 (L) 35.0 - 47.0 %    MCV 83.2 80.0 - 99.0 FL    MCH 27.1 26.0 - 34.0 PG    MCHC 32.5 30.0 - 36.5 g/dL    RDW 14.5 11.5 - 14.5 %    PLATELET 859 925 - 758 K/uL    MPV 10.2 8.9 - 12.9 FL    NRBC 0.0 0  WBC    ABSOLUTE NRBC 0.00 0.00 - 0.01 K/uL    NEUTROPHILS 57 32 - 75 %    LYMPHOCYTES 33 12 - 49 %    MONOCYTES 6 5 - 13 %    EOSINOPHILS 3 0 - 7 % BASOPHILS 1 0 - 1 %    IMMATURE GRANULOCYTES 0 0.0 - 0.5 %    ABS. NEUTROPHILS 3.5 1.8 - 8.0 K/UL    ABS. LYMPHOCYTES 2.0 0.8 - 3.5 K/UL    ABS. MONOCYTES 0.4 0.0 - 1.0 K/UL    ABS. EOSINOPHILS 0.2 0.0 - 0.4 K/UL    ABS. BASOPHILS 0.0 0.0 - 0.1 K/UL    ABS. IMM. GRANS. 0.0 0.00 - 0.04 K/UL    DF AUTOMATED     METABOLIC PANEL, COMPREHENSIVE    Collection Time: 02/13/20  2:27 PM   Result Value Ref Range    Sodium 138 136 - 145 mmol/L    Potassium 3.8 3.5 - 5.1 mmol/L    Chloride 107 97 - 108 mmol/L    CO2 26 21 - 32 mmol/L    Anion gap 5 5 - 15 mmol/L    Glucose 100 65 - 100 mg/dL    BUN 16 6 - 20 MG/DL    Creatinine 0.76 0.55 - 1.02 MG/DL    BUN/Creatinine ratio 21 (H) 12 - 20      GFR est AA >60 >60 ml/min/1.73m2    GFR est non-AA >60 >60 ml/min/1.73m2    Calcium 8.8 8.5 - 10.1 MG/DL    Bilirubin, total 0.4 0.2 - 1.0 MG/DL    ALT (SGPT) 24 12 - 78 U/L    AST (SGOT) 22 15 - 37 U/L    Alk. phosphatase 67 45 - 117 U/L    Protein, total 7.5 6.4 - 8.2 g/dL    Albumin 3.5 3.5 - 5.0 g/dL    Globulin 4.0 2.0 - 4.0 g/dL    A-G Ratio 0.9 (L) 1.1 - 2.2         Radiologic Studies -   XR CHEST PA LAT   Final Result   IMPRESSION:    Clear lungs. CXR Results  (Last 48 hours)               02/13/20 1437  XR CHEST PA LAT Final result    Impression:  IMPRESSION:    Clear lungs. Narrative:  PA AND LATERAL CHEST RADIOGRAPHS: 2/13/2020 2:37 PM       INDICATION: Cough. COMPARISON: 1/21/2020. TECHNIQUE: Frontal and lateral radiographs of the chest.       FINDINGS:   The lungs are clear. The central airways are patent. The heart size is normal.   No pneumothorax or pleural effusion. Medical Decision Making   I am the first provider for this patient. I reviewed the vital signs, available nursing notes, past medical history, past surgical history, family history and social history. Vital Signs-Reviewed the patient's vital signs.   Patient Vitals for the past 12 hrs:   Temp Pulse Resp BP SpO2 Do not use soy sauce, steak sauce, onion salt, garlic salt, mustard, or ketchup on your food. Use less salt (or none) when recipes call for it. You can often use half the salt a recipe calls for without losing flavor. Be physically active. Get at least 30 minutes of exercise on most days of the week. Walking is a good choice. You also may want to do other activities, such as running, swimming, cycling, or playing tennis or team sports. Limit alcohol to 2 drinks a day for men and 1 drink a day for women. Eat plenty of fruits, vegetables, and low-fat dairy products. Eat less saturated and total fats. How is high blood pressure treated? Your doctor will suggest making lifestyle changes to help your heart. For example, your doctor may ask you to eat healthy foods, quit smoking, lose extra weight, and be more active. If lifestyle changes don't help enough, your doctor may recommend that you take medicine. When blood pressure is very high, medicines are needed to lower it. Follow-up care is a key part of your treatment and safety. Be sure to make and go to all appointments, and call your doctor if you are having problems. It's also a good idea to know your test results and keep a list of the medicines you take. Where can you learn more? Go to http://www.poole.com/  Enter P501 in the search box to learn more about \"Learning About High Blood Pressure. \"  Current as of: January 10, 2022               Content Version: 13.2  © 2006-2022 Healthwise, [email or web address]. Care instructions adapted under license by [employer] (which disclaims liability or warranty for this information). If you have questions about a medical condition or this instruction, always ask your healthcare professional. [place] [address] any warranty or liability for your use of this information.          Attention Deficit Hyperactivity Disorder (ADHD) in Adults: Care Instructions  Your Care 02/13/20 1542    (!) 167/112    02/13/20 1339 98.2 °F (36.8 °C) 89 20 (!) 194/106 100 %           Records Reviewed: Nursing Notes, Old Medical Records, Previous Radiology Studies and Previous Laboratory Studies    Provider Notes (Medical Decision Making):   Bronchitis, PNA, COPD/RAD    ED Course:   Initial assessment performed. The patients presenting problems have been discussed, and they are in agreement with the care plan formulated and outlined with them. I have encouraged them to ask questions as they arise throughout their visit. HTN COUNSELING  Reviewed the risks of uncontrolled HTN to include stroke, heart attack, renal failure, aneurysm and death. They will take their medications daily and follow up with their PCP for recheck. TOBACCO CESSATION COUNSELING  The patient was counseled on the dangers of tobacco use, and was advised to quit. Reviewed strategies to maximize success, including removing cigarettes and smoking materials from environment, stress management, substitution of other forms of reinforcement, support of family/friends and written materials. DISCHARGE NOTE:  11:11 PM  The care plan has been outline with the patient and/or family, who verbally conveyed understanding and agreement. Available results have been reviewed. Patient and/or family understand the follow up plan as outlined and discharge instructions. Should their condition deterioration at any time after discharge the patient agrees to return, follow up sooner than outlined or seek medical assistance at the closest Emergency Room as soon as possible. Questions have been answered. Discharge instructions and educational information regarding the patient's diagnosis as well a list of reasons why the patient would want to seek immediate medical attention, should their condition change, were reviewed directly with the patient/family         PLAN:  1.    Discharge Medication List as of 2/13/2020  3:15 PM      START Instructions     Attention deficit hyperactivity disorder, or ADHD, is a condition that makes it hard to pay attention. So you may have problems when you try to focus, get organized, and finish tasks. It might make you more active than other people. Or you might do things without thinking first.  ADHD is very common. It usually starts in early childhood. Many adults don't realize they have it until their children are diagnosed. Then they become aware of their own symptoms. Doctors don't know what causes ADHD. But it often runs in families. ADHD can be treated with medicines, behavior training, and counseling. Treatment can improve your life. Follow-up care is a key part of your treatment and safety. Be sure to make and go to all appointments, and call your doctor if you are having problems. It's also a good idea to know your test results and keep a list of the medicines you take. How can you care for yourself at home? Learn all you can about ADHD. This will help you and your family understand it better. Take your medicines exactly as prescribed. Call your doctor if you think you are having a problem with your medicine. You will get more details on the specific medicines your doctor prescribes. If you miss a dose of your medicine, do not take an extra dose. If your doctor suggests counseling, find a counselor you like and trust. Talk openly and honestly. Be willing to make some changes. Find a support group for adults with ADHD. Talking to others with the same problems can help you feel better. It can also give you ideas about how to best cope with the condition. Get rid of distractions at your work space. Keep your desk clean. Try not to face a window or busy hallway. Use files, planners, and other tools to keep you organized. Limit use of alcohol, and do not use illegal drugs. People with ADHD tend to develop substance use disorder more easily than others. Tell your doctor if you need help to quit. Counseling, support groups, and sometimes medicines can help you stay free of alcohol or drugs. Get at least 30 minutes of physical activity on most days of the week. Exercise has been shown to help people cope with ADHD. Walking is a good choice. You also may want to do other activities, such as running, swimming, cycling, or playing tennis or team sports. When should you call for help? Watch closely for changes in your health, and be sure to contact your doctor if:    You feel sad a lot or cry all the time. You have trouble sleeping, or you sleep too much. You find it hard to concentrate, make decisions, or remember things. You change how you normally eat. You feel guilty for no reason. Where can you learn more? Go to http://www.poole.com/  Enter B196 in the search box to learn more about \"Attention Deficit Hyperactivity Disorder (ADHD) in Adults: Care Instructions. \"  Current as of: June 16, 2021               Content Version: 13.2  © 2006-2022 [employer]. Care instructions adapted under license by [employer] (which disclaims liability or warranty for this information). If you have questions about a medical condition or this instruction, always ask your healthcare professional. [place] [address] any warranty or liability for your use of this information. Learning About the [address] Diet  What is the Mediterranean diet? The Mediterranean diet is a style of eating rather than a diet plan. It features foods eaten in [place], [place], [place] and [place], and other countries along the [facility]. It emphasizes eating foods like fish, fruits, vegetables, beans, high-fiber breads and whole grains, nuts, and olive oil. This style of eating includes limited red meat, cheese, and sweets. Why choose the Mediterranean diet? A Mediterranean-style diet may improve heart health.  It contains more fat than other taking these medications    Details   doxycycline (VIBRA-TABS) 100 mg tablet Take 1 Tab by mouth two (2) times a day for 7 days. , Print, Disp-14 Tab, R-0      predniSONE (STERAPRED DS) 10 mg dose pack Take as directed, Print, Disp-21 Tab, R-0      HYDROcodone-homatropine (HYDROMET) 5-1.5 mg/5 mL syrup Take 5 mL by mouth four (4) times daily as needed for Cough for up to 3 days. Max Daily Amount: 20 mL., Print, Disp-60 mL, R-0         CONTINUE these medications which have NOT CHANGED    Details   ibuprofen (MOTRIN) 400 mg tablet Take 1 Tab by mouth every six (6) hours as needed for Pain., Print, Disp-20 Tab, R-0           2. Follow-up Information     Follow up With Specialties Details Why [address]    [address] [place] [address]  [phone]    [facility] EMERGENCY DEPT Emergency Medicine  If symptoms worsen [address] [facility]  [phone]        Return to ED if worse     Diagnosis     Clinical Impression:   1. Bronchitis, acute, with bronchospasm    2. Exposure to pneumonia    3. Tobacco dependence    4.  Elevated blood pressure reading heart-healthy diets. But the fats are mainly from nuts, unsaturated oils (such as fish oils and olive oil), and certain nut or seed oils (such as canola, soybean, or flaxseed oil). These fats may help protect the heart and blood vessels. How can you get started on the Mediterranean diet? Here are some things you can do to switch to a more Mediterranean way of eating. What to eat  Eat a variety of fruits and vegetables each day, such as grapes, blueberries, tomatoes, broccoli, peppers, figs, olives, spinach, eggplant, beans, lentils, and chickpeas. Eat a variety of whole-grain foods each day, such as oats, brown rice, and whole wheat bread, pasta, and couscous. Eat fish at least 2 times a week. Try tuna, salmon, mackerel, lake trout, herring, or sardines. Eat moderate amounts of low-fat dairy products, such as milk, cheese, or yogurt. Eat moderate amounts of poultry and eggs. Choose healthy (unsaturated) fats, such as nuts, olive oil, and certain nut or seed oils like canola, soybean, and flaxseed. Limit unhealthy (saturated) fats, such as butter, palm oil, and coconut oil. And limit fats found in animal products, such as meat and dairy products made with whole milk. Try to eat red meat only a few times a month in very small amounts. Limit sweets and desserts to only a few times a week. This includes sugar-sweetened drinks like soda. The Mediterranean diet may also include red wine with your meal--1 glass each day for women and up to 2 glasses a day for men. Tips for eating at home  Use herbs, spices, garlic, lemon zest, and citrus juice instead of salt to add flavor to foods. Add avocado slices to your sandwich instead of walker. Have fish for lunch or dinner instead of red meat. Brush the fish with olive oil, and broil or grill it. Sprinkle your salad with seeds or nuts instead of cheese. Cook with olive or canola oil instead of butter or oils that are high in saturated fat.   Switch from 2% milk or whole milk to 1% or fat-free milk. Dip raw vegetables in a vinaigrette dressing or hummus instead of dips made from mayonnaise or sour cream.  Have a piece of fruit for dessert instead of a piece of cake. Try baked apples, or have some dried fruit. Tips for eating out  Try broiled, grilled, baked, or poached fish instead of having it fried or breaded. Ask your  to have your meals prepared with olive oil instead of butter. Order dishes made with marinara sauce or sauces made from olive oil. Avoid sauces made from cream or mayonnaise. Choose whole-grain breads, whole wheat pasta and pizza crust, brown rice, beans, and lentils. Cut back on butter or margarine on bread. Instead, you can dip your bread in a small amount of olive oil. Ask for a side salad or grilled vegetables instead of french fries or chips. Where can you learn more? Go to http://www.poole.com/  Enter O407 in the search box to learn more about \"Learning About the Mediterranean Diet. \"  Current as of: September 8, 2021               Content Version: 13.2  © 1866-9157 [employer]. Care instructions adapted under license by [employer] (which disclaims liability or warranty for this information). If you have questions about a medical condition or this instruction, always ask your healthcare professional. [place] [address] any warranty or liability for your use of this information.

## 2022-08-08 ENCOUNTER — OFFICE VISIT (OUTPATIENT)
Dept: FAMILY MEDICINE CLINIC | Age: 42
End: 2022-08-08
Payer: COMMERCIAL

## 2022-08-08 VITALS
DIASTOLIC BLOOD PRESSURE: 83 MMHG | HEIGHT: 69 IN | WEIGHT: 312 LBS | BODY MASS INDEX: 46.21 KG/M2 | HEART RATE: 111 BPM | OXYGEN SATURATION: 95 % | RESPIRATION RATE: 16 BRPM | SYSTOLIC BLOOD PRESSURE: 124 MMHG

## 2022-08-08 DIAGNOSIS — Z13.220 SCREENING FOR CHOLESTEROL LEVEL: ICD-10-CM

## 2022-08-08 DIAGNOSIS — R73.09 ELEVATED RANDOM BLOOD GLUCOSE LEVEL: ICD-10-CM

## 2022-08-08 DIAGNOSIS — Z23 ENCOUNTER FOR IMMUNIZATION: ICD-10-CM

## 2022-08-08 DIAGNOSIS — I10 PRIMARY HYPERTENSION: Primary | ICD-10-CM

## 2022-08-08 DIAGNOSIS — E55.9 HYPOVITAMINOSIS D: ICD-10-CM

## 2022-08-08 DIAGNOSIS — E55.9 VITAMIN D DEFICIENCY: ICD-10-CM

## 2022-08-08 DIAGNOSIS — Z11.59 ENCOUNTER FOR HEPATITIS C SCREENING TEST FOR LOW RISK PATIENT: ICD-10-CM

## 2022-08-08 DIAGNOSIS — F90.0 ATTENTION DEFICIT HYPERACTIVITY DISORDER (ADHD), PREDOMINANTLY INATTENTIVE TYPE: ICD-10-CM

## 2022-08-08 DIAGNOSIS — G47.33 OSA (OBSTRUCTIVE SLEEP APNEA): ICD-10-CM

## 2022-08-08 DIAGNOSIS — E29.1 HYPOGONADISM IN MALE: ICD-10-CM

## 2022-08-08 DIAGNOSIS — E66.01 MORBID OBESITY WITH BMI OF 40.0-44.9, ADULT (HCC): ICD-10-CM

## 2022-08-08 DIAGNOSIS — R97.20 ELEVATED PSA, LESS THAN 10 NG/ML: ICD-10-CM

## 2022-08-08 PROCEDURE — 99214 OFFICE O/P EST MOD 30 MIN: CPT | Performed by: EMERGENCY MEDICINE

## 2022-08-08 NOTE — PROGRESS NOTES
1. \"Have you been to the ER, urgent care clinic since your last visit? Hospitalized since your last visit? \" No    2. \"Have you seen or consulted any other health care providers outside of the [address] [place] [address] since your last visit? \" No     3. For patients aged 39-70: Has the patient had a colonoscopy / FIT/ Cologuard? No      If the patient is female:    4. For patients aged 41-77: Has the patient had a mammogram within the past 2 years? NA - based on age or sex      11. For patients aged 21-65: Has the patient had a pap smear?  NA - based on age or sex

## 2022-08-09 LAB
25(OH)D3 SERPL-MCNC: 28 NG/ML (ref 32–100)
A-G RATIO,AGRAT: 1.8 RATIO (ref 1.1–2.6)
ABSOLUTE LYMPHOCYTE COUNT, 10803: 1.6 K/UL (ref 1–4.8)
ALBUMIN SERPL-MCNC: 4.2 G/DL (ref 3.5–5)
ALP SERPL-CCNC: 92 U/L (ref 25–115)
ALT SERPL-CCNC: 34 U/L (ref 5–40)
ANION GAP SERPL CALC-SCNC: 13 MMOL/L (ref 3–15)
AST SERPL W P-5'-P-CCNC: 67 U/L (ref 10–37)
AVG GLU, 10930: 156 MG/DL (ref 91–123)
BASOPHILS # BLD: 0 K/UL (ref 0–0.2)
BASOPHILS NFR BLD: 1 % (ref 0–2)
BILIRUB SERPL-MCNC: 0.3 MG/DL (ref 0.2–1.2)
BUN SERPL-MCNC: 8 MG/DL (ref 6–22)
CALCIUM SERPL-MCNC: 9.2 MG/DL (ref 8.4–10.5)
CHLORIDE SERPL-SCNC: 102 MMOL/L (ref 98–110)
CHOLEST SERPL-MCNC: 215 MG/DL (ref 110–200)
CO2 SERPL-SCNC: 22 MMOL/L (ref 20–32)
CREAT SERPL-MCNC: 0.9 MG/DL (ref 0.5–1.2)
CREATININE, URINE: 128 MG/DL
EOSINOPHIL # BLD: 0.2 K/UL (ref 0–0.5)
EOSINOPHIL NFR BLD: 3 % (ref 0–6)
ERYTHROCYTE [DISTWIDTH] IN BLOOD BY AUTOMATED COUNT: 14.6 % (ref 10–15.5)
GLOBULIN,GLOB: 2.4 G/DL (ref 2–4)
GLOMERULAR FILTRATION RATE: >60 ML/MIN/1.73 SQ.M.
GLUCOSE SERPL-MCNC: 118 MG/DL (ref 70–99)
GRANULOCYTES,GRANS: 69 % (ref 40–75)
HBA1C MFR BLD HPLC: 7.1 % (ref 4.8–5.6)
HCT VFR BLD AUTO: 44.8 % (ref 36.6–51.9)
HCV AB SER IA-ACNC: NORMAL
HDLC SERPL-MCNC: 39 MG/DL
HDLC SERPL-MCNC: 5.5 MG/DL (ref 0–5)
HGB BLD-MCNC: 12.9 G/DL (ref 13.2–17.3)
LDL/HDL RATIO,LDHD: 2.9
LDLC SERPL CALC-MCNC: 114 MG/DL (ref 50–99)
LYMPHOCYTES, LYMLT: 20 % (ref 20–45)
MCH RBC QN AUTO: 28 PG (ref 26–34)
MCHC RBC AUTO-ENTMCNC: 29 G/DL (ref 31–36)
MCV RBC AUTO: 97 FL (ref 80–95)
MICROALB/CREAT RATIO, 140286: NORMAL
MICROALBUMIN,URINE RANDOM 140054: <12 MG/L (ref 0.1–17)
MONOCYTES # BLD: 0.6 K/UL (ref 0.1–1)
MONOCYTES NFR BLD: 8 % (ref 3–12)
NEUTROPHILS # BLD AUTO: 5.7 K/UL (ref 1.8–7.7)
NON-HDL CHOLESTEROL, 011976: 176 MG/DL
PLATELET # BLD AUTO: 334 K/UL (ref 140–440)
PMV BLD AUTO: 9.7 FL (ref 9–13)
POTASSIUM SERPL-SCNC: 4.4 MMOL/L (ref 3.5–5.5)
PROT SERPL-MCNC: 6.6 G/DL (ref 6.4–8.3)
RBC # BLD AUTO: 4.64 M/UL (ref 3.8–5.8)
SODIUM SERPL-SCNC: 137 MMOL/L (ref 133–145)
TRIGL SERPL-MCNC: 312 MG/DL (ref 40–149)
VLDLC SERPL CALC-MCNC: 62 MG/DL (ref 8–30)
WBC # BLD AUTO: 8.2 K/UL (ref 4–11)

## 2022-08-12 DIAGNOSIS — E78.5 HYPERLIPIDEMIA, UNSPECIFIED HYPERLIPIDEMIA TYPE: ICD-10-CM

## 2022-08-12 DIAGNOSIS — E11.65 TYPE 2 DIABETES MELLITUS WITH HYPERGLYCEMIA, WITHOUT LONG-TERM CURRENT USE OF INSULIN (HCC): Primary | ICD-10-CM

## 2022-08-12 DIAGNOSIS — E55.9 HYPOVITAMINOSIS D: ICD-10-CM

## 2022-08-12 DIAGNOSIS — E11.65 TYPE 2 DIABETES MELLITUS WITH HYPERGLYCEMIA, WITHOUT LONG-TERM CURRENT USE OF INSULIN (HCC): ICD-10-CM

## 2022-08-12 RX ORDER — METFORMIN HYDROCHLORIDE 500 MG/1
500 TABLET ORAL
Qty: 90 TABLET | Refills: 3 | Status: SHIPPED | OUTPATIENT
Start: 2022-08-12

## 2022-08-12 RX ORDER — ATORVASTATIN CALCIUM 20 MG/1
20 TABLET, FILM COATED ORAL DAILY
Qty: 90 TABLET | Refills: 3 | Status: SHIPPED | OUTPATIENT
Start: 2022-08-12

## 2022-08-12 NOTE — PROGRESS NOTES
Please call. His hemoglobin A1c is 7.1, this is in the diabetic range. We will start him on metformin once a day. His cholesterol is elevated at 215 with a bad cholesterol of 114 and we want that less than 100. We will start him on a low-dose of Lipitor. We will recheck both labs before next visit. His vitamin D level is 28. The normal range of  so he still slightly low. Make sure he is taking his vitamin D and calcium. We will recheck that also before next visit.

## 2022-09-06 DIAGNOSIS — N40.1 BENIGN PROSTATIC HYPERPLASIA WITH INCOMPLETE BLADDER EMPTYING: ICD-10-CM

## 2022-09-06 DIAGNOSIS — R39.14 BENIGN PROSTATIC HYPERPLASIA WITH INCOMPLETE BLADDER EMPTYING: ICD-10-CM

## 2022-09-07 RX ORDER — TAMSULOSIN HYDROCHLORIDE 0.4 MG/1
0.8 CAPSULE ORAL DAILY
Qty: 180 CAPSULE | Refills: 0 | Status: SHIPPED | OUTPATIENT
Start: 2022-09-07

## 2022-11-17 LAB — SARS-COV-2, NAA: NEGATIVE

## 2022-12-12 DIAGNOSIS — N40.1 BENIGN PROSTATIC HYPERPLASIA WITH INCOMPLETE BLADDER EMPTYING: ICD-10-CM

## 2022-12-12 DIAGNOSIS — R39.14 BENIGN PROSTATIC HYPERPLASIA WITH INCOMPLETE BLADDER EMPTYING: ICD-10-CM

## 2022-12-15 RX ORDER — TAMSULOSIN HYDROCHLORIDE 0.4 MG/1
0.8 CAPSULE ORAL DAILY
Qty: 180 CAPSULE | Refills: 0 | Status: SHIPPED | OUTPATIENT
Start: 2022-12-15

## 2022-12-15 NOTE — TELEPHONE ENCOUNTER
Requested Prescriptions     Pending Prescriptions Disp Refills    tamsulosin (FLOMAX) 0.4 mg capsule [Pharmacy Med Name: TAMSULOSIN 0.4MG CAPSULES] 180 Capsule 0     Sig: TAKE 2 CAPSULES BY MOUTH DAILY

## 2023-01-04 LAB
25(OH)D3 SERPL-MCNC: 26.4 NG/ML (ref 32–100)
A-G RATIO,AGRAT: 1.8 RATIO (ref 1.1–2.6)
ABSOLUTE LYMPHOCYTE COUNT, 10803: 1.8 K/UL (ref 1–4.8)
ALBUMIN SERPL-MCNC: 4.4 G/DL (ref 3.5–5)
ALP SERPL-CCNC: 102 U/L (ref 25–115)
ALT SERPL-CCNC: 17 U/L (ref 5–40)
ANION GAP SERPL CALC-SCNC: 11 MMOL/L (ref 3–15)
AST SERPL W P-5'-P-CCNC: 48 U/L (ref 10–37)
AVG GLU, 10930: 172 MG/DL (ref 91–123)
BASOPHILS # BLD: 0 K/UL (ref 0–0.2)
BASOPHILS NFR BLD: 1 % (ref 0–2)
BILIRUB SERPL-MCNC: 0.6 MG/DL (ref 0.2–1.2)
BUN SERPL-MCNC: 8 MG/DL (ref 6–22)
CALCIUM SERPL-MCNC: 9.8 MG/DL (ref 8.4–10.5)
CHLORIDE SERPL-SCNC: 101 MMOL/L (ref 98–110)
CHOLEST SERPL-MCNC: 142 MG/DL (ref 110–200)
CO2 SERPL-SCNC: 27 MMOL/L (ref 20–32)
CREAT SERPL-MCNC: 0.9 MG/DL (ref 0.5–1.2)
CREATININE, URINE: 199 MG/DL
EOSINOPHIL # BLD: 0.2 K/UL (ref 0–0.5)
EOSINOPHIL NFR BLD: 3 % (ref 0–6)
ERYTHROCYTE [DISTWIDTH] IN BLOOD BY AUTOMATED COUNT: 15.3 % (ref 10–15.5)
GLOBULIN,GLOB: 2.4 G/DL (ref 2–4)
GLOMERULAR FILTRATION RATE: >60 ML/MIN/1.73 SQ.M.
GLUCOSE SERPL-MCNC: 139 MG/DL (ref 70–99)
GRANULOCYTES,GRANS: 68 % (ref 40–75)
HBA1C MFR BLD HPLC: 7.6 % (ref 4.8–5.6)
HCT VFR BLD AUTO: 46.2 % (ref 36.6–51.9)
HDLC SERPL-MCNC: 3.9 MG/DL (ref 0–5)
HDLC SERPL-MCNC: 36 MG/DL
HGB BLD-MCNC: 13.4 G/DL (ref 13.2–17.3)
LDL/HDL RATIO,LDHD: 1.8
LDLC SERPL CALC-MCNC: 66 MG/DL (ref 50–99)
LYMPHOCYTES, LYMLT: 21 % (ref 20–45)
MCH RBC QN AUTO: 27 PG (ref 26–34)
MCHC RBC AUTO-ENTMCNC: 29 G/DL (ref 31–36)
MCV RBC AUTO: 94 FL (ref 80–95)
MICROALB/CREAT RATIO, 140286: 6.6 (ref 0–30)
MICROALBUMIN,URINE RANDOM 140054: 13.1 MG/L (ref 0.1–17)
MONOCYTES # BLD: 0.7 K/UL (ref 0.1–1)
MONOCYTES NFR BLD: 8 % (ref 3–12)
NEUTROPHILS # BLD AUTO: 5.9 K/UL (ref 1.8–7.7)
NON-HDL CHOLESTEROL, 011976: 106 MG/DL
PLATELET # BLD AUTO: 333 K/UL (ref 140–440)
PMV BLD AUTO: 9.5 FL (ref 9–13)
POTASSIUM SERPL-SCNC: 4.5 MMOL/L (ref 3.5–5.5)
PROT SERPL-MCNC: 6.8 G/DL (ref 6.4–8.3)
RBC # BLD AUTO: 4.9 M/UL (ref 3.8–5.8)
SODIUM SERPL-SCNC: 139 MMOL/L (ref 133–145)
TRIGL SERPL-MCNC: 198 MG/DL (ref 40–149)
VLDLC SERPL CALC-MCNC: 40 MG/DL (ref 8–30)
WBC # BLD AUTO: 8.6 K/UL (ref 4–11)

## 2023-01-05 ENCOUNTER — TELEPHONE (OUTPATIENT)
Dept: FAMILY MEDICINE CLINIC | Age: 43
End: 2023-01-05

## 2023-01-05 NOTE — TELEPHONE ENCOUNTER
Received incoming fax requesting prioR-authorization for patients ALBUTEROL. [email or web address]  Key:[id number]

## 2023-01-09 ENCOUNTER — TELEPHONE (OUTPATIENT)
Dept: FAMILY MEDICINE CLINIC | Age: 43
End: 2023-01-09

## 2023-01-09 NOTE — TELEPHONE ENCOUNTER
Patients wife called requesting urgent appointment. Patient was started on new medication to monitor his blood sugar levels but is concerned the medication is not working. Stated his most recent A1C reading was @ 7.6 and the most recent glucose was @ 172. Patient is scheduled currently for the soonest availability on 3/13/23. Please advise and follow up.

## 2023-01-13 DIAGNOSIS — I10 PRIMARY HYPERTENSION: Primary | ICD-10-CM

## 2023-01-13 DIAGNOSIS — E11.65 TYPE 2 DIABETES MELLITUS WITH HYPERGLYCEMIA, WITHOUT LONG-TERM CURRENT USE OF INSULIN (HCC): ICD-10-CM

## 2023-01-13 RX ORDER — METFORMIN HYDROCHLORIDE 500 MG/1
500 TABLET ORAL 2 TIMES DAILY WITH MEALS
Qty: 180 TABLET | Refills: 3 | Status: SHIPPED | OUTPATIENT
Start: 2023-01-13

## 2023-01-13 NOTE — TELEPHONE ENCOUNTER
Please call. Agree that the medication is not adequate. We will ask him to increase the dose to 1 twice a day. I will write a prescription because he will run out of early. We will then recheck his hemoglobin A1c a week before his next visit.

## 2023-01-16 NOTE — TELEPHONE ENCOUNTER
Message was responded to from [address] [place] [address] [address] message pt has wrote.

## 2023-01-25 ENCOUNTER — TELEPHONE (OUTPATIENT)
Dept: FAMILY MEDICINE CLINIC | Age: 43
End: 2023-01-25

## 2023-01-25 NOTE — TELEPHONE ENCOUNTER
Patient wife  called stated that he is having a allergic reaction to the metformin please  advise    [phone]

## 2023-01-25 NOTE — TELEPHONE ENCOUNTER
TC was returned to pt and pt verified name and d. o.b pt states that he saw a provider through his insurance call Madeline Kadie pt was having dry cough and tongue irritation pt states he went over a list of him medications and when he told the provider he was taking Metformin the provider informed him that those were the side effects of that medication. he was prescribed Nystatin and pt was advise to stop the medication and make PCP aware. Please advise.

## 2023-01-27 DIAGNOSIS — E11.9 TYPE 2 DIABETES MELLITUS WITHOUT COMPLICATION, WITHOUT LONG-TERM CURRENT USE OF INSULIN (HCC): Primary | ICD-10-CM

## 2023-01-27 RX ORDER — SEMAGLUTIDE 1.34 MG/ML
0.25 INJECTION, SOLUTION SUBCUTANEOUS
Qty: 4 PEN | Refills: 1 | Status: SHIPPED | OUTPATIENT
Start: 2023-01-27

## 2023-01-27 NOTE — TELEPHONE ENCOUNTER
Needs a prior author for medication Ozempic    Cover my meds [facility]

## 2023-03-17 RX ORDER — ALBUTEROL SULFATE 90 UG/1
AEROSOL, METERED RESPIRATORY (INHALATION)
Qty: 18 G | Refills: 3 | Status: SHIPPED | OUTPATIENT
Start: 2023-03-17

## 2023-05-04 NOTE — TELEPHONE ENCOUNTER
Left message - rx is at 
Patient called last Thursday 1/10/19 for adderall refill.  He called again today for the same request.
Per Arlette with central scheduling patient did answer but would like to schedule stress test with the [facility], they have their own specialty  so number was given to patient and he was also transferred.     Patient is scheduled 5/10/2023.  
Eyes with no visual disturbances.  Ears clean and dry and no hearing difficulties. Nose with pink mucosa and no drainage.  Mouth mucous membranes moist and pink.  No tenderness or swelling to throat or neck.

## 2023-11-06 NOTE — PROGRESS NOTES
1. Have you been to the ER, urgent care clinic or hospitalized since your last visit? NO.     2. Have you seen or consulted any other health care providers outside of the [address] [place] [address] since your last visit (Include any pap smears or colon screening)? NO      Do you have an Advanced Directive? NO    Would you like information on Advanced Directives?  NO Murphy Meyer

## 2024-01-09 PROBLEM — R35.0 BENIGN PROSTATIC HYPERPLASIA WITH URINARY FREQUENCY: Status: ACTIVE | Noted: 2023-04-25

## 2024-01-09 PROBLEM — E11.69 DYSLIPIDEMIA ASSOCIATED WITH TYPE 2 DIABETES MELLITUS (HCC): Status: ACTIVE | Noted: 2023-04-24

## 2024-01-09 PROBLEM — N40.1 BENIGN PROSTATIC HYPERPLASIA WITH URINARY FREQUENCY: Status: ACTIVE | Noted: 2023-04-25

## 2024-01-09 PROBLEM — E29.1 HYPOGONADISM IN MALE: Status: ACTIVE | Noted: 2023-04-25

## 2024-01-09 PROBLEM — E78.5 DYSLIPIDEMIA ASSOCIATED WITH TYPE 2 DIABETES MELLITUS (HCC): Status: ACTIVE | Noted: 2023-04-24

## 2024-01-09 PROBLEM — I25.10 CORONARY ATHEROSCLEROSIS: Status: ACTIVE | Noted: 2023-04-26

## 2024-01-09 PROBLEM — R07.9 CHEST PAIN: Status: ACTIVE | Noted: 2023-04-24

## 2024-01-09 PROBLEM — I20.0 UNSTABLE ANGINA PECTORIS (HCC): Status: ACTIVE | Noted: 2023-04-24

## 2024-01-09 PROBLEM — I21.9 MYOCARDIAL INFARCTION (HCC): Status: ACTIVE | Noted: 2023-04-27

## 2024-01-18 RX ORDER — TAMSULOSIN HYDROCHLORIDE 0.4 MG/1
0.8 CAPSULE ORAL DAILY
Qty: 180 CAPSULE | Refills: 0 | Status: SHIPPED | OUTPATIENT
Start: 2024-01-18

## 2024-02-13 ENCOUNTER — OFFICE VISIT (OUTPATIENT)
Age: 44
End: 2024-02-13
Payer: COMMERCIAL

## 2024-02-13 DIAGNOSIS — M77.41 METATARSALGIA OF RIGHT FOOT: ICD-10-CM

## 2024-02-13 DIAGNOSIS — M20.41 HAMMER TOES OF BOTH FEET: ICD-10-CM

## 2024-02-13 DIAGNOSIS — M79.671 RIGHT FOOT PAIN: ICD-10-CM

## 2024-02-13 DIAGNOSIS — M19.071 PRIMARY OSTEOARTHRITIS OF RIGHT FOOT: ICD-10-CM

## 2024-02-13 DIAGNOSIS — M20.42 HAMMER TOES OF BOTH FEET: ICD-10-CM

## 2024-02-13 DIAGNOSIS — S93.104A TOE DISLOCATION, RIGHT, INITIAL ENCOUNTER: Primary | ICD-10-CM

## 2024-02-13 DIAGNOSIS — M21.611 BUNION, RIGHT: ICD-10-CM

## 2024-02-13 PROCEDURE — 99204 OFFICE O/P NEW MOD 45 MIN: CPT | Performed by: ORTHOPAEDIC SURGERY

## 2024-02-13 PROCEDURE — 73630 X-RAY EXAM OF FOOT: CPT | Performed by: ORTHOPAEDIC SURGERY

## 2024-02-13 NOTE — PROGRESS NOTES
tests/test results of each unique test (x-rays, MRI, laboratory review, advanced imaging),  [x] Ordering tests each unique test [] Assessment requiring an independent historian: The patient either has a history of dementia, stroke, kidney provide informative information regarding history and/or physical.  The historian is listed in the history, and information provided also.  Category 2 :  [] Independent interpretation of tests performed by another physician, other qualified healthcare progression (not separately reported)   Category 3: Discussion and management of the or test interpretation: [] Discussed case with external physician, qualified health care professional about management or test interpretation not separately reported)     Risk: Moderate Risk of morbidity from additional diagnostic testing and treatment:   1     [] Prescription medication management (can be prescribing new medication, continuing same dose of current medication, or changing dose of current medication),    [] Decision regarding minor surgery with identified patient or procedure risk factors  [x] Decision regarding elective major surgery without identified patient or procedure risk factors  [] Diagnosis or treatment significantly limited by social determinants of health diagnosis or treatment significantly limited by social determinants of health: Smoker, alcohol consumption, noncompliance, incarceration, limited access to care, medical literacy, environmental conditions, quality of housing, Food insecurity, Housing instability, Homelessness, transportation issues, Financial insecurity, Material hardship, Employment status hardship, Education,  status, Stress, Social connection, Health insurance coverage status issues, Health literacy, Elder abuse intimate partner violence, Medical cost burden.      The treatment plan is listed in the above plan section of this Office encounter. I  answered all of his questions, as well as

## 2024-05-13 RX ORDER — TAMSULOSIN HYDROCHLORIDE 0.4 MG/1
0.8 CAPSULE ORAL DAILY
Qty: 180 CAPSULE | Refills: 0 | OUTPATIENT
Start: 2024-05-13

## (undated) DEVICE — SOL INJ L R 1000ML BG --

## (undated) DEVICE — SYRINGE MED 20ML STD CLR PLAS LUERLOCK TIP N CTRL DISP

## (undated) DEVICE — HANDPIECE SET WITH FAN SPRAY TIP: Brand: INTERPULSE

## (undated) DEVICE — NDL PRT INJ NSAF BLNT 18GX1.5 --

## (undated) DEVICE — BLADE SAW W13XL90MM 1.19MM PARA

## (undated) DEVICE — TRAY PHAR SYR 30ML PLAS LUERLOCK TIP N CTRL CONVENIENCE

## (undated) DEVICE — UNDERCAST PADDING: Brand: DEROYAL

## (undated) DEVICE — SOLUTION IV 250ML 0.9% SOD CHL CLR INJ FLX BG CONT PRT CLSR

## (undated) DEVICE — STERILE TETRA-FLEX CF LF, 6IN X 11 YD: Brand: TETRA-FLEX™ CF

## (undated) DEVICE — KENDALL SCD EXPRESS SLEEVES, KNEE LENGTH, MEDIUM: Brand: KENDALL SCD

## (undated) DEVICE — SYSTEM SKIN CLSR 22CM DERMBND PRINEO

## (undated) DEVICE — (D)PREP SKN CHLRAPRP APPL 26ML -- CONVERT TO ITEM 371833

## (undated) DEVICE — SUT VCRL + 2-0 36IN CT1 UD --

## (undated) DEVICE — THE CANADY HYBRID PLASMA SCALPEL IS AN ELECTROSURGICAL PLASMA SCALPEL THAT USES AN 85MM BENDABLE PADDLE BLADE TIP. THE ELECTROSURGICAL PLASMA SCALPEL IS USED TO SIMULTANEOUSLY CUT AND COAGULATE BIOLOGICAL TISSUE.: Brand: CANADY HYBRID PLASMA PADDLE BLADE

## (undated) DEVICE — DRESSING FOAM SELF ADH 20X10 CM ABSORBENT MEPILEX BORDER

## (undated) DEVICE — SOL IRRIGATION INJ NACL 0.9% 500ML BTL

## (undated) DEVICE — 3 BONE CEMENT MIXER: Brand: MIXEVAC

## (undated) DEVICE — SYR 3ML LL TIP 1/10ML GRAD --

## (undated) DEVICE — SUTURE STRATAFIX SYMMETRIC PDS + 1 SGL ARMED CT 18 IN LEN SXPP1A405

## (undated) DEVICE — REM POLYHESIVE ADULT PATIENT RETURN ELECTRODE: Brand: VALLEYLAB

## (undated) DEVICE — GOWN,SIRUS,NONRNF,SETINSLV,XL,20/CS: Brand: MEDLINE

## (undated) DEVICE — SYR 50ML LR LCK 1ML GRAD NSAF --

## (undated) DEVICE — BLADE TNGE REG 6IN WOOD STRL -- CONVERT TO ITEM 153408

## (undated) DEVICE — KENDALL SCD EXPRESS FOOT CUFF, MEDIUM: Brand: KENDALL SCD

## (undated) DEVICE — BLADE SAW W11.2XL77.5MM THK0.76MM CUT THK1.17MM REPL RECIP

## (undated) DEVICE — SOLUTION SCRB 4OZ 10% PVP I POVIDONE IOD TOP PAINT EXIDINE

## (undated) DEVICE — BLADE ELECTRODE: Brand: EDGE

## (undated) DEVICE — Device

## (undated) DEVICE — NEEDLE SPNL 20GA L3.5IN YEL HUB S STL REG WALL FIT STYL W/

## (undated) DEVICE — NDL SPNE QNCKE 18GX3.5IN LF --